# Patient Record
Sex: MALE | Race: BLACK OR AFRICAN AMERICAN | Employment: OTHER | URBAN - METROPOLITAN AREA
[De-identification: names, ages, dates, MRNs, and addresses within clinical notes are randomized per-mention and may not be internally consistent; named-entity substitution may affect disease eponyms.]

---

## 2018-03-07 ENCOUNTER — OFFICE VISIT (OUTPATIENT)
Dept: FAMILY MEDICINE | Facility: CLINIC | Age: 73
End: 2018-03-07
Payer: COMMERCIAL

## 2018-03-07 VITALS
OXYGEN SATURATION: 9 % | TEMPERATURE: 98.6 F | SYSTOLIC BLOOD PRESSURE: 153 MMHG | DIASTOLIC BLOOD PRESSURE: 74 MMHG | HEART RATE: 48 BPM | WEIGHT: 176.8 LBS

## 2018-03-07 DIAGNOSIS — Z29.9 PREVENTIVE MEASURE: Primary | ICD-10-CM

## 2018-03-07 DIAGNOSIS — I25.118 CORONARY ARTERY DISEASE OF NATIVE HEART WITH STABLE ANGINA PECTORIS, UNSPECIFIED VESSEL OR LESION TYPE (H): ICD-10-CM

## 2018-03-07 DIAGNOSIS — I10 ESSENTIAL HYPERTENSION, BENIGN: ICD-10-CM

## 2018-03-07 LAB
ALBUMIN SERPL-MCNC: 4.4 MG/DL (ref 3.3–4.9)
ALP SERPL-CCNC: 63.5 U/L (ref 40–150)
ALT SERPL-CCNC: 17.8 U/L (ref 0–45)
AST SERPL-CCNC: 20.2 U/L (ref 0–55)
BILIRUB SERPL-MCNC: 0.7 MG/DL (ref 0.2–1.3)
BUN SERPL-MCNC: 20.1 MG/DL (ref 7–21)
CALCIUM SERPL-MCNC: 9.6 MG/DL (ref 8.5–10.1)
CHLORIDE SERPLBLD-SCNC: 105.1 MMOL/L (ref 98–110)
CO2 SERPL-SCNC: 24.9 MMOL/L (ref 20–32)
CREAT SERPL-MCNC: 1.4 MG/DL (ref 0.7–1.3)
GFR SERPL CREATININE-BSD FRML MDRD: 53.3 ML/MIN/1.7 M2
GLUCOSE SERPL-MCNC: 107 MG'DL (ref 70–99)
HBA1C MFR BLD: 5.7 % (ref 4.1–5.7)
POTASSIUM SERPL-SCNC: 4 MMOL/DL (ref 3.2–4.6)
PROT SERPL-MCNC: 7.1 G/DL (ref 6.8–8.8)
SODIUM SERPL-SCNC: 136.2 MMOL/L (ref 132–142)

## 2018-03-07 RX ORDER — CHOLECALCIFEROL (VITAMIN D3) 50 MCG
2000 TABLET ORAL DAILY
Qty: 100 TABLET | Refills: 3 | Status: SHIPPED | OUTPATIENT
Start: 2018-03-07 | End: 2019-04-09

## 2018-03-07 NOTE — PATIENT INSTRUCTIONS
1 Get meds from home  for reconciliation  2 Get list of meds from pharmacy  3 Get pharmacy appointment  to go over your medicaiotbns   4 I have added vitamin/sent to your pharmacy  5 once you have see the pharmacist make and appointment  with me, we will check  your kidneys today    PHARM - D APPOINTMENT:  Date:  Monday March 12, 2018  Time:  10:00 AM  Demetria Rosa  3/8/18

## 2018-03-07 NOTE — MR AVS SNAPSHOT
After Visit Summary   3/7/2018    Srikanth Graves    MRN: [id number]           Patient Information     Date Of Birth          1945        Visit Information        Provider Department      3/7/2018 10:40 AM Rohit Pastor MD [facility]        Today's Diagnoses     Preventive measure    -  1    Essential hypertension, benign        Coronary artery disease of native heart with stable angina pectoris, unspecified vessel or lesion type (H)          Care Instructions    1 Get meds from home  for reconciliation  2 Get list of meds from pharmacy  3 Get pharmacy appointment  to go over your medicaiotbns   4 I have added vitamin/sent to your pharmacy  5 once you have see the pharmacist make and appointment  with me, we will check  your kidneys today          Follow-ups after your visit        Who to contact     Please call your clinic at [phone] to:    Ask questions about your health    Make or cancel appointments    Discuss your medicines    Learn about your test results    Speak to your doctor            Additional Information About Your Visit        MyChart Information     [employer] is an electronic gateway that provides easy, online access to your medical records. With [facility], you can request a clinic appointment, read your test results, renew a prescription or communicate with your care team.     To sign up for [employer] visit the website at [email or web address]   You will be asked to enter the access code listed below, as well as some personal information. Please follow the directions to create your username and password.     Your access code is: [email or web address]  Expires: 2018 11:03 AM     Your access code will  in 90 days. If you need help or a new code, please contact your [facility] or call [phone] for assistance.        Care EveryWhere ID     This is your Care EveryWhere ID. This could be used by other organizations to access your [place]  medical records  [id number]        Your Vitals Were     Pulse Temperature Pulse Oximetry             48 98.6  F (37  C) (Oral) 9%          Blood Pressure from Last 3 Encounters:   03/07/18 153/74    Weight from Last 3 Encounters:   03/07/18 176 lb 12.8 oz (80.2 kg)              We Performed the Following     Comprehensive Metabolic Panel ([facility])     Hemoglobin A1c (P )     Vitamin D 25-Hydroxy ([facility])          Today's Medication Changes          These changes are accurate as of 3/7/18 11:03 AM.  If you have any questions, ask your nurse or doctor.               Start taking these medicines.        Dose/Directions    vitamin D 2000 UNITS tablet   Used for:  Preventive measure   Started by:  Rohit Pastor MD        Dose:  2000 Units   Take 2,000 Units by mouth daily   Quantity:  100 tablet   Refills:  3            Where to get your medicines      These medications were sent to [facility] [place], [place] - [address], [place] [address]     Phone:  [phone]     vitamin D 2000 UNITS tablet                Primary Care Provider Office Phone # Fax #    Rohit Pastor [phone]       [address] [place] [id number]        Equal Access to Services     STEPHANIE FLOWERS AH: Hadii lily ku hadasho Sobennettali, waaxda luqadaha, qaybta kaalmada adeegyada, evens salomon. So [facility] [phone].    ATENCIÓN: Si habla español, tiene a andres disposición servicios gratuitos de asistencia lingüística. Edwar al [phone].    We comply with applicable federal civil rights laws and [place] laws. We do not discriminate on the basis of race, color, national origin, age, disability, sex, sexual orientation, or gender identity.            Thank you!     Thank you for choosing [facility]  for your care. Our goal is always to provide you with excellent care. Hearing back from our patients is one way we can continue to improve our  services. Please take a few minutes to complete the written survey that you may receive in the mail after your visit with us. Thank you!             Your Updated Medication List - Protect others around you: Learn how to safely use, store and throw away your medicines at www.disposemymeds.org.          This list is accurate as of 3/7/18 11:03 AM.  Always use your most recent med list.                   Brand Name Dispense Instructions for use Diagnosis    vitamin D 2000 UNITS tablet     100 tablet    Take 2,000 Units by mouth daily    Preventive measure

## 2018-03-07 NOTE — PROGRESS NOTES
"S: Srikanth Graves is a 72 year old male who returns for follow up of  To establish care, see at [facility]/\"not happy with care\"   Patients states that main concern today is  Establish care and follow kidneys, was seen recently  At [facility] on 1/22/17 Creat was 2.25 GFR 35 for  and old he had kidney problems, .  He states that He has back problems and in the past was going pain clinic at [facility] and  Long time ago was on methadone    PMHX/PSHX/MEDS/ALLERGIES/SHX/FHX reviewed and updated in Epic.  From records, CAD, HTN,dylipedemai, high sugars bur normal A1C  ROS:  General: No fevers, chills  Head: No headache  Ears: No acute change in hearing.    CV: No chest pain or palpitations.  Resp: No shortness of breath.  No cough. No hemoptysis.  GI: No nausea, vomiting, constipation, diarrhea  : No urinary pains    O: /74  Pulse (!) 48  Temp 98.6  F (37  C) (Oral)  Wt 176 lb 12.8 oz (80.2 kg)  SpO2 (!) 9%   Gen:  Well nourished and in NAD    Neck: supple without lymphadenopathy  CV:  RRR  - no murmurs, rubs, or gallups,   Pulm:  CTAB, no wheezes/rales/rhonchi, good air entry   ABD: soft, nontender, no masses, no rebound, BS intact throughout  Extrem: no cyanosis, edema or clubbing  Psych: Euthymic      1. Establish care  2 HTN: needs better controlled BMP( recheck creat/GFR) today as well A1C, need med reconciliation  3 Back pain   No narcotic pain from me/accepts plan  Tylenol 500 mg BID to TID, activity as tolerated to keep ROM and flexibility,  Will add vitamin D/at risk  and  Continue taken your calcium supplements  3 Need med reconciliation appoinment with pharmacy, then appointment  with me        RTC in 3 weeks, for follow up of HTN or sooner if develops new or worsening symptoms.    Rohit Pastor"

## 2018-03-07 NOTE — LETTER
March 7, 2018      Srikanth Graves  [address] [place] [address]        Dear Srikanth,  Kidney function has gotten better    Need better  Blood pressure control  Please see below for your test results.    Resulted Orders   Hemoglobin A1c ([facility])   Result Value Ref Range    Hemoglobin A1C 5.7 4.1 - 5.7 %   Comprehensive Metabolic Panel ([facility])   Result Value Ref Range    Albumin 4.4 3.3 - 4.9 mg/dL    Alkaline Phosphatase 63.5 40.0 - 150.0 U/L    ALT 17.8 0.0 - 45.0 U/L    AST 20.2 0.0 - 55.0 U/L    Bilirubin Total 0.7 0.2 - 1.3 mg/dL    Urea Nitrogen 20.1 7.0 - 21.0 mg/dL    Calcium 9.6 8.5 - 10.1 mg/dL    Chloride 105.1 98.0 - 110.0 mmol/L    Carbon Dioxide 24.9 20.0 - 32.0 mmol/L    Creatinine 1.4 (H) 0.7 - 1.3 mg/dL    Glucose 107.0 (H) 70.0 - 99.0 mg'dL    Potassium 4.0 3.2 - 4.6 mmol/dL    Sodium 136.2 132.0 - 142.0 mmol/L    Protein Total 7.1 6.8 - 8.8 g/dL    GFR Estimate 53.3 (L) >60.0 mL/min/1.7 m2    GFR Estimate If Black 64.4 >60.0 mL/min/1.7 m2       If you have any questions, please call the clinic to make an appointment.    Sincerely,    Rohit Pastor MD

## 2018-03-07 NOTE — LETTER
March 8, 2018      Srikanth Graves  [address]  [place] [address]        Dear Srikanth,  Vitamin d is good    Continue taken the vitamin D 2000Iu                Please see below for your test results.    Resulted Orders   Vitamin D 25-Hydroxy ([facility])   Result Value Ref Range    Vitamin D,25-Hydroxy 35.7 30.0 - 80.0 ng/mL    Narrative    Test performed by:  [facility]  [address] [place] [address]  Deficiency <10.0 ng/mL  Insufficiency 10.0-29.9 ng/mL  Sufficiency 30.0-80.0 ng/mL  Toxicity (possible) >100.0 ng/mL   Hemoglobin A1c ([facility])   Result Value Ref Range    Hemoglobin A1C 5.7 4.1 - 5.7 %   Comprehensive Metabolic Panel ([facility])   Result Value Ref Range    Albumin 4.4 3.3 - 4.9 mg/dL    Alkaline Phosphatase 63.5 40.0 - 150.0 U/L    ALT 17.8 0.0 - 45.0 U/L    AST 20.2 0.0 - 55.0 U/L    Bilirubin Total 0.7 0.2 - 1.3 mg/dL    Urea Nitrogen 20.1 7.0 - 21.0 mg/dL    Calcium 9.6 8.5 - 10.1 mg/dL    Chloride 105.1 98.0 - 110.0 mmol/L    Carbon Dioxide 24.9 20.0 - 32.0 mmol/L    Creatinine 1.4 (H) 0.7 - 1.3 mg/dL    Glucose 107.0 (H) 70.0 - 99.0 mg'dL    Potassium 4.0 3.2 - 4.6 mmol/dL    Sodium 136.2 132.0 - 142.0 mmol/L    Protein Total 7.1 6.8 - 8.8 g/dL    GFR Estimate 53.3 (L) >60.0 mL/min/1.7 m2    GFR Estimate If Black 64.4 >60.0 mL/min/1.7 m2       If you have any questions, please call the clinic to make an appointment.    Sincerely,    Rohit Pastor MD

## 2018-03-08 LAB — 25(OH)D3 SERPL-MCNC: 35.7 NG/ML (ref 30–80)

## 2018-03-12 ENCOUNTER — OFFICE VISIT (OUTPATIENT)
Dept: PHARMACY | Facility: CLINIC | Age: 73
End: 2018-03-12
Payer: COMMERCIAL

## 2018-03-12 VITALS
TEMPERATURE: 98.1 F | DIASTOLIC BLOOD PRESSURE: 78 MMHG | WEIGHT: 176.4 LBS | HEART RATE: 48 BPM | SYSTOLIC BLOOD PRESSURE: 162 MMHG | OXYGEN SATURATION: 97 %

## 2018-03-12 DIAGNOSIS — E87.6 HYPOKALEMIA: ICD-10-CM

## 2018-03-12 DIAGNOSIS — H40.1111 PRIMARY OPEN ANGLE GLAUCOMA OF RIGHT EYE, MILD STAGE: ICD-10-CM

## 2018-03-12 DIAGNOSIS — G89.29 CHRONIC LOW BACK PAIN, UNSPECIFIED BACK PAIN LATERALITY, WITH SCIATICA PRESENCE UNSPECIFIED: ICD-10-CM

## 2018-03-12 DIAGNOSIS — G47.00 INSOMNIA, UNSPECIFIED TYPE: ICD-10-CM

## 2018-03-12 DIAGNOSIS — K21.9 GASTROESOPHAGEAL REFLUX DISEASE, ESOPHAGITIS PRESENCE NOT SPECIFIED: ICD-10-CM

## 2018-03-12 DIAGNOSIS — K59.09 OTHER CONSTIPATION: Primary | ICD-10-CM

## 2018-03-12 DIAGNOSIS — H04.123 DRY EYES: ICD-10-CM

## 2018-03-12 DIAGNOSIS — F32.A DEPRESSION, UNSPECIFIED DEPRESSION TYPE: ICD-10-CM

## 2018-03-12 DIAGNOSIS — I25.118 CORONARY ARTERY DISEASE OF NATIVE ARTERY OF NATIVE HEART WITH STABLE ANGINA PECTORIS (H): ICD-10-CM

## 2018-03-12 DIAGNOSIS — Z00.00 ROUTINE ADULT HEALTH MAINTENANCE: ICD-10-CM

## 2018-03-12 DIAGNOSIS — Z29.9 PREVENTIVE MEASURE: ICD-10-CM

## 2018-03-12 DIAGNOSIS — J44.9 CHRONIC OBSTRUCTIVE PULMONARY DISEASE, UNSPECIFIED COPD TYPE (H): ICD-10-CM

## 2018-03-12 DIAGNOSIS — L85.3 DRY SKIN: ICD-10-CM

## 2018-03-12 DIAGNOSIS — M54.5 CHRONIC LOW BACK PAIN, UNSPECIFIED BACK PAIN LATERALITY, WITH SCIATICA PRESENCE UNSPECIFIED: ICD-10-CM

## 2018-03-12 DIAGNOSIS — I10 ESSENTIAL HYPERTENSION: ICD-10-CM

## 2018-03-12 PROBLEM — G89.4 CHRONIC PAIN DISORDER: Status: ACTIVE | Noted: 2017-02-08

## 2018-03-12 PROBLEM — M53.3 COCCYDYNIA: Status: ACTIVE | Noted: 2018-03-12

## 2018-03-12 PROBLEM — R51.9 HEADACHE DISORDER: Status: ACTIVE | Noted: 2017-05-12

## 2018-03-12 PROBLEM — E55.9 VITAMIN D DEFICIENCY: Status: ACTIVE | Noted: 2017-11-24

## 2018-03-12 PROBLEM — F43.21 ADJUSTMENT DISORDER WITH DEPRESSED MOOD: Status: ACTIVE | Noted: 2017-07-11

## 2018-03-12 PROBLEM — M54.50 CHRONIC LOW BACK PAIN: Status: ACTIVE | Noted: 2018-03-12

## 2018-03-12 PROBLEM — F41.9 ANXIETY DISORDER: Status: RESOLVED | Noted: 2018-01-31 | Resolved: 2018-03-12

## 2018-03-12 PROBLEM — I73.9 PERIPHERAL VASCULAR DISEASE (H): Status: ACTIVE | Noted: 2018-03-12

## 2018-03-12 PROBLEM — R06.09 DYSPNEA ON EXERTION: Status: ACTIVE | Noted: 2017-09-22

## 2018-03-12 PROBLEM — R94.39 ABNORMAL STRESS ECHOCARDIOGRAM: Status: ACTIVE | Noted: 2018-03-12

## 2018-03-12 PROBLEM — R94.39 ABNORMAL STRESS ECHOCARDIOGRAM: Status: RESOLVED | Noted: 2018-03-12 | Resolved: 2018-03-12

## 2018-03-12 PROBLEM — Z91.148 CONTROLLED SUBSTANCE AGREEMENT TERMINATED: Status: ACTIVE | Noted: 2017-07-07

## 2018-03-12 PROBLEM — F43.21 ADJUSTMENT DISORDER WITH DEPRESSED MOOD: Status: RESOLVED | Noted: 2017-07-11 | Resolved: 2018-03-12

## 2018-03-12 PROBLEM — I25.10 CORONARY ATHEROSCLEROSIS: Status: RESOLVED | Noted: 2018-03-12 | Resolved: 2018-03-12

## 2018-03-12 PROBLEM — I25.10 CORONARY ATHEROSCLEROSIS: Status: ACTIVE | Noted: 2018-03-12

## 2018-03-12 PROBLEM — F41.9 ANXIETY DISORDER: Status: ACTIVE | Noted: 2018-01-31

## 2018-03-12 PROBLEM — M50.30 DEGENERATION OF CERVICAL INTERVERTEBRAL DISC: Status: ACTIVE | Noted: 2018-03-12

## 2018-03-12 RX ORDER — ATORVASTATIN CALCIUM 20 MG/1
20 TABLET, FILM COATED ORAL
COMMUNITY
Start: 2017-06-21 | End: 2018-03-14

## 2018-03-12 RX ORDER — ALBUTEROL SULFATE 90 UG/1
AEROSOL, METERED RESPIRATORY (INHALATION)
COMMUNITY
Start: 2017-06-21 | End: 2018-03-14

## 2018-03-12 RX ORDER — ISOSORBIDE MONONITRATE 30 MG/1
30 TABLET, EXTENDED RELEASE ORAL
COMMUNITY
Start: 2018-01-22 | End: 2018-03-12

## 2018-03-12 RX ORDER — TIOTROPIUM BROMIDE 18 UG/1
CAPSULE ORAL; RESPIRATORY (INHALATION)
COMMUNITY
Start: 2018-01-05 | End: 2018-03-12

## 2018-03-12 RX ORDER — DORZOLAMIDE HYDROCHLORIDE AND TIMOLOL MALEATE 20; 5 MG/ML; MG/ML
SOLUTION/ DROPS OPHTHALMIC
COMMUNITY
Start: 2017-09-25 | End: 2018-03-14

## 2018-03-12 RX ORDER — LISINOPRIL 40 MG/1
40 TABLET ORAL
COMMUNITY
Start: 2018-01-05 | End: 2018-03-12

## 2018-03-12 RX ORDER — POTASSIUM CHLORIDE 750 MG/1
20 TABLET, EXTENDED RELEASE ORAL DAILY
Qty: 90 TABLET | COMMUNITY
Start: 2018-03-12 | End: 2018-03-14

## 2018-03-12 RX ORDER — NITROGLYCERIN 400 UG/1
SPRAY ORAL
COMMUNITY
Start: 2017-04-07 | End: 2019-01-30

## 2018-03-12 RX ORDER — RANOLAZINE 500 MG/1
500 TABLET, EXTENDED RELEASE ORAL DAILY
COMMUNITY
Start: 2018-03-12 | End: 2019-03-01

## 2018-03-12 RX ORDER — FUROSEMIDE 20 MG
20 TABLET ORAL DAILY
Qty: 60 TABLET | Refills: 1 | COMMUNITY
Start: 2018-03-12 | End: 2018-03-14

## 2018-03-12 RX ORDER — TRAZODONE HYDROCHLORIDE 50 MG/1
TABLET, FILM COATED ORAL
COMMUNITY
Start: 2017-05-03 | End: 2018-03-13

## 2018-03-12 RX ORDER — MULTIVITAMIN WITH FOLIC ACID 400 MCG
TABLET ORAL
COMMUNITY
Start: 2017-06-21 | End: 2018-03-12

## 2018-03-12 RX ORDER — POTASSIUM CHLORIDE 750 MG/1
20 TABLET, EXTENDED RELEASE ORAL
COMMUNITY
Start: 2017-12-21 | End: 2018-03-12

## 2018-03-12 RX ORDER — ISOSORBIDE MONONITRATE 30 MG/1
30 TABLET, EXTENDED RELEASE ORAL DAILY
Qty: 30 TABLET | COMMUNITY
Start: 2018-03-12 | End: 2018-08-31

## 2018-03-12 RX ORDER — RANOLAZINE 500 MG/1
500 TABLET, EXTENDED RELEASE ORAL
COMMUNITY
Start: 2018-02-28 | End: 2018-03-12

## 2018-03-12 RX ORDER — TIOTROPIUM BROMIDE 18 UG/1
18 CAPSULE ORAL; RESPIRATORY (INHALATION) DAILY
Qty: 30 CAPSULE | COMMUNITY
Start: 2018-03-12 | End: 2018-03-14

## 2018-03-12 RX ORDER — POLYVINYL ALCOHOL 14 MG/ML
1 SOLUTION/ DROPS OPHTHALMIC PRN
COMMUNITY
Start: 2018-03-12 | End: 2018-03-14

## 2018-03-12 RX ORDER — LISINOPRIL 40 MG/1
40 TABLET ORAL DAILY
Qty: 30 TABLET | COMMUNITY
Start: 2018-03-12 | End: 2018-03-14

## 2018-03-12 RX ORDER — ACETAMINOPHEN 500 MG
1000 TABLET ORAL 3 TIMES DAILY
COMMUNITY
Start: 2018-02-21 | End: 2018-03-14

## 2018-03-12 RX ORDER — CLOPIDOGREL BISULFATE 75 MG/1
75 TABLET ORAL
COMMUNITY
Start: 2018-02-09 | End: 2018-03-12

## 2018-03-12 RX ORDER — LANOLIN ALCOHOL/MO/W.PET/CERES
CREAM (GRAM) TOPICAL 3 TIMES DAILY PRN
COMMUNITY
Start: 2018-03-12 | End: 2018-03-14

## 2018-03-12 RX ORDER — METOPROLOL TARTRATE 100 MG
TABLET ORAL
COMMUNITY
Start: 2017-12-01 | End: 2018-03-14

## 2018-03-12 RX ORDER — ASPIRIN 81 MG
100 TABLET, DELAYED RELEASE (ENTERIC COATED) ORAL 2 TIMES DAILY PRN
Qty: 60 TABLET | Refills: 11 | Status: SHIPPED | OUTPATIENT
Start: 2018-03-12 | End: 2019-04-26

## 2018-03-12 RX ORDER — CLOPIDOGREL BISULFATE 75 MG/1
75 TABLET ORAL DAILY
Qty: 30 TABLET | COMMUNITY
Start: 2018-03-12 | End: 2018-03-14

## 2018-03-12 RX ORDER — LIDOCAINE 50 MG/G
OINTMENT TOPICAL
COMMUNITY
Start: 2018-01-05 | End: 2018-03-14

## 2018-03-12 RX ORDER — LANOLIN ALCOHOL/MO/W.PET/CERES
454 CREAM (GRAM) TOPICAL
COMMUNITY
Start: 2017-06-21 | End: 2018-03-12

## 2018-03-12 RX ORDER — POLYVINYL ALCOHOL 14 MG/ML
1 SOLUTION/ DROPS OPHTHALMIC
COMMUNITY
Start: 2016-02-03 | End: 2018-03-12

## 2018-03-12 RX ORDER — ECHINACEA PURPUREA EXTRACT 125 MG
TABLET ORAL
COMMUNITY
Start: 2018-02-21 | End: 2018-03-14

## 2018-03-12 RX ORDER — MULTIVITAMIN WITH FOLIC ACID 400 MCG
1 TABLET ORAL DAILY
Qty: 30 TABLET | COMMUNITY
Start: 2018-03-12 | End: 2018-03-14

## 2018-03-12 NOTE — PATIENT INSTRUCTIONS
Tylenol- 500 mg (2 tablets) three times a day for pain    Use your Albuterol only as needed when your are having shortness of breath/loud breathing noises or cough.  Otherwise keep taking your preventative inhalers every day.     Ranitidine - No more than 150 mg (1 tablet) daily as needed for heartburn symptoms.    Stop taking Benadryl.  Use Trazodone for sleep.    Stop taking Docusate-sennosides.     We will be sending a new prescription for Docusate - 1-2 tablets daily as needed for constipation.      Return to see Dr. Pastor in 1-2 weeks.

## 2018-03-12 NOTE — PROGRESS NOTES
Medication Management Note                                                       Srikanth was referred by Dr. Pastor for pharmacy services for med management.    MEDICATION REVIEW:  Discussed all medication indications, dosage and effectiveness, adverse effects, and adherence with patient/caregiver.    Pt had meds with them: yes  Pt had med list with them: yes (from [facility])  Pt was knowledgeable about meds: yes  Medications set up by: self   Medications administered by someone else (e.g., LTCF): No  Pt uses a medication box or automated dispenser: no - see subjective section  Called pharmacy to obtain or clarify med list:  no  Called HHN or LTCF to obtain or clarify med list:  no    Medication Discrepancies  Patient new to [facility], and only had vitamin D on his medication list in this Epic system. All other medications had to be entered. All discrepancies listed below are in regards to his med list from the Cardiology note at [facility] available from Care Everywhere on 2/28/2018:     Medications on [facility] med list that pt is NOT taking:  yes, calcium/vit D, benzonatate, cetirizine, milk of magnesia, Robitussin DM, terazosin, naratriptan, and Mintox   Medications pt IS taking that are NOT on [facility] med list (e.g., from specialist, hospital): yes, vit D3, artificial tears, and ranolazine were added to his medication list   OTC meds/ dietary supplements pt taking on own that are NOT on [facility] med list:  none  Dosage listed differently than how patient is taking: yes    Patient was decreased from Imdur 120 mg daily to 30 mg daily about 1 month ago    Patient sometimes only takes 25 mg of trazodone for sleep   Frequency listed differently than how patient is taking: yes, patient states he sometimes takes another furosemide if his left foot is swollen  Duplicate medication on list (two occurrences of the same medication):  none  TOTAL NUMBER OF MEDICATION DISCREPANCIES:  13    Subjective  "                                                      Patient reports the following problems or concerns with their medications:  Yes    Patient used to take a higher dose of aspirin (325) and stated it made his \"kidneys hurt,\" but the lower dose (81mg) has improved this.    The Imdur makes his headaches worse but the lower dose has improved this.    He dislikes having to take so many medications in general.  Patient reports the following adverse reactions to medications:  yes, see above  Pt reports missing doses:  never  Additional subjective information (e.g., reason for visit, frequency of PRNs, reasons meds were D/C ed):    Patient prefers to use the [facility] on [address]    He was recently increased from sertraline 25 mg daily to 50 mg daily about 1 month ago.    Patient stated he was taking acetaminophen 1000 mg in the morning and 500 mg in the evening. He stated it helps a little bit with his pain and headaches. He describes trying to have a good attitude, but it gets difficult sometimes because the pain makes him angry.    Patient reports having his aspirin dose decreased to 81 mg daily about 1 month ago. He was also recently started on clopidogrel after stent placement in Feb 2018. He denies signs/symptoms of bleed like bruising, bloody noses, dark stools.     Patient stated he took the ranitidine 150 mg as needed, but up to 2 times per day. He states that pasta, chili, and BBQ sauce make his acid reflux worse.     Patient states he has been suffering from a cough for over a year. He threw away his Robitussin and benzonatate because they did not work at all.    Patient stated he occasionally takes an additional furosemide if his left foot is swollen, but that is only 1-2 times per month.    Patient described appropriate technique for his Spiriva, Dulera, and Ventolin. He uses his Spiriva and Dulera regularly, but he also described using his albuterol regularly (3-4 times per day even " without symptoms of shortness of breath or wheezing).     He takes his AM meds first thing in the morning when he eats breakfast, and his PM meds with the 18:00 news. His significant other was able to verify that he takes his medications regularly and is able to manage all of them even without a pill box. Patient states he tried pill boxes in the past, but he actually forgets to take them if they are in the box, so he uses the bottles instead.     Objective                                                       Patient Active Problem List   Diagnosis     Other constipation     Abnormal stress echocardiogram     Adjustment disorder with depressed mood     Advance care planning     Anxiety disorder     Backache     Bunion of great toe     Degeneration of cervical intervertebral disc     Chronic airway obstruction (H)     Chronic low back pain     Chronic pain disorder     Chronic obstructive pulmonary disease (H)     Coronary artery disease of native artery of native heart with stable angina pectoris (H)     Coronary atherosclerosis     Depression     DJD (degenerative joint disease), ankle and foot     Dyspnea on exertion     Edema     Esophageal reflux     Essential hypertension     Headache disorder     Peripheral vascular disease (H)     Primary open angle glaucoma of right eye, mild stage     Vitamin D deficiency       Current Outpatient Prescriptions   Medication Sig Dispense Refill     docusate sodium (COLACE) 100 MG tablet Take 100 mg by mouth 2 times daily as needed for constipation 60 tablet 11     Cholecalciferol (VITAMIN D) 2000 UNITS tablet Take 2,000 Units by mouth daily 100 tablet 3       Social History   Substance Use Topics     Smoking status: Former Smoker     Smokeless tobacco: Never Used     Alcohol use No       Lab Results   Component Value Date    A1C 5.7 03/07/2018     Last Basic Metabolic Panel:  Lab Results   Component Value Date    .2 03/07/2018      Lab Results   Component Value Date     POTASSIUM 4.0 03/07/2018     Lab Results   Component Value Date    CHLORIDE 105.1 03/07/2018     Lab Results   Component Value Date    ROCKY 9.6 03/07/2018     Lab Results   Component Value Date    CO2 24.9 03/07/2018     Lab Results   Component Value Date    BUN 20.1 03/07/2018     Lab Results   Component Value Date    CR 1.4 03/07/2018     Lab Results   Component Value Date    .0 03/07/2018       BP Readings from Last 3 Encounters:   03/12/18 162/78   03/07/18 153/74         Assessment                                                       COPD -  controlled     Patient has the following inhalers: Spiriva, Dulera, and Ventolin.    Patient has been taking all 3 inhalers on a regular basis even when he does not have symptoms of shortness of breath or wheezing. Does not understand that some are controllers and Ventolin is a rescue.    Patient demonstrated and described appropriate inhaler use.    GERD -  controlled     Patient was taking ranitidine 150 mg up to twice a day depending on how he felt his acid reflux was doing and what types of food he eats (pasta, chili, and BBQ sauce make it worse).     CrCL just under 50 mL/min based on SCr of 1.4 in clinic today.    Maximum dose of ranitidine 150 mg once daily, if CrCL is <50 mL/min, as it can accumulate.     Pain -  uncontrolled     Patient takes 1000 mg acetaminophen in the morning and 500 mg at bedtime. Maximum dose is 3000 mg daily with no liver disease.     Sleep -  controlled     Patient has been using trazodone and diphenhydramine as needed for sleep (mostly on days when he takes a nap earlier as it is more difficult to fall asleep later on after the nap).     Diphenhydramine is on the Beer's List and has higher risk of anticholinergic adverse effects as he is 72 years old.     Constipation -  controlled     Patient has been using docusate 50 mg/sennosides 8.6 mg once daily at bedtime every day but reports no issues with irregularity.      Plan/Recommendations                                                       Updated medication list in the EMR; deleted meds patient no longer taking and added meds patient is now taking, and changed doses where there was a dose discrepancy.    All medications were reviewed and found to be indicated, effective, safe and convenient/ affordable unless drug therapy problem(s) was/were identified, as are described below.      Completed at this visit     COPD    Continue Spiriva and Dulera inhalers    Instructed patient to only use albuterol (Ventolin) inhaler if needed for symptoms of shortness of breath or wheezing.    GERD    Decrease frequency of ranitidine to 150 mg once daily for acid reflux.    Pain    Increase acetaminophen dose to 1000 mg by mouth 3 times daily for pain.    Sleep    Continue trazodone as needed for sleep.    Discontinue diphenhydramine.     Constipation    Initiate docusate 100 mg take 1-2 capsules twice daily as needed.    Instructed patient that he may use the sennosides/docusate as needed if he is constipated, but not to take it every day.     To be completed at a future visit  Hypertension    Assess blood pressure control and BP medications at next visit.     Pain    Assess pain control with new dose of acetaminophen at the next visit.     Cough    Assess at next visit: lisinopril side-effect vs GERD vs other cause.    Options for treatment and/or follow-up care were reviewed with the patient.  Srikanth was engaged and actively involved in the decision making process, verbalized understanding of the options discussed, and was satisfied with the final plan.    Follow-up                                                       Patient should follow up in 1-2 weeks with Dr. Pastor and pharmacist.  Patient was provided with written instructions/medication list via AVS.     Dr. Pastor was provided the recommendations above  in clinic today and Dr. Pastor was available for supervision during this  visit and is the authorizing prescriber for this visit through the pharmacist collaborative practice agreement.    Ana Dee PharmD Student    Drug therapy problems identified  1. Med: albuterol HFA - Compliance - Patient using medication more often than prescribed/needed - Resolution: Educate patient; resolved  2. Med: ranitidine - Safety - dose too high - Resolution: Change dose; resolved   3. Med: acetaminophen - Efficacy  - dose too low - Resolution: Change dose; resolved   4. Med: diphenhydramine - Safety - risk of side-effects - Resolution: Discontinue Drug; resolved  5. Med: docusate/sennosides - Indication - unnecessary drug therapy - Resolution: Change drug; resolved  6.   Med: lisinopril, metoprolol - Indication - May need additional drug therapy - Resolution: Intiate Drug; deferred to future visit  7.   Med: lisinopril - Safety - possible adverse effect - Resolution: Change drug; deferred to future visit    # of medical conditions addressed: 5  # of medications addressed: 34  # of medication discrepancies identified: 13  # of DTP identified: 5  Time spent: >60 minutes  Level of service: 5    The student acted as scribe and the encounter documented was completely performed by myself. I have reviewed and verified the student s documentation and found it to be correct and complete.  Daja Luna, Pharm.D.

## 2018-03-12 NOTE — MR AVS SNAPSHOT
After Visit Summary   3/12/2018    Srikanth Graves    MRN: [id number]           Patient Information     Date Of Birth          1945        Visit Information        Provider Department      3/12/2018 10:00 [facility]        Today's Diagnoses     Other constipation    -  1      Care Instructions    Tylenol- 500 mg (2 tablets) three times a day for pain    Use your Albuterol only as needed when your are having shortness of breath/loud breathing noises or cough.  Otherwise keep taking your preventative inhalers every day.     Ranitidine - No more than 150 mg (1 tablet) daily as needed for heartburn symptoms.    Stop taking Benadryl.  Use Trazodone for sleep.    Stop taking Docusate-sennosides.     We will be sending a new prescription for Docusate - 1-2 tablets daily as needed for constipation.      Return to see Dr. Pastor in 1-2 weeks.          Follow-ups after your visit        Who to contact     Please call your clinic at [phone] to:    Ask questions about your health    Make or cancel appointments    Discuss your medicines    Learn about your test results    Speak to your doctor            Additional Information About Your Visit        MyChart Information     [employer] is an electronic gateway that provides easy, online access to your medical records. With [employer], you can request a clinic appointment, read your test results, renew a prescription or communicate with your care team.     To sign up for [employer] visit the website at [email or web address]   You will be asked to enter the access code listed below, as well as some personal information. Please follow the directions to create your username and password.     Your access code is: [email or web address]  Expires: 2018 12:03 PM     Your access code will  in 90 days. If you need help or a new code, please contact your [facility] or call [phone] for assistance.        Care  EveryWhere ID     This is your Care EveryWhere ID. This could be used by other organizations to access your [facility] medical records  [id number]        Your Vitals Were     Pulse Temperature Pulse Oximetry             48 98.1  F (36.7  C) (Oral) 97%          Blood Pressure from Last 3 Encounters:   03/12/18 162/78   03/07/18 153/74    Weight from Last 3 Encounters:   03/12/18 176 lb 6.4 oz (80 kg)   03/07/18 176 lb 12.8 oz (80.2 kg)              Today, you had the following     No orders found for display         Today's Medication Changes          These changes are accurate as of 3/12/18 11:45 AM.  If you have any questions, ask your nurse or doctor.               Start taking these medicines.        Dose/Directions    docusate sodium 100 MG tablet   Commonly known as:  COLACE   Used for:  Other constipation        Dose:  100 mg   Take 100 mg by mouth 2 times daily as needed for constipation   Quantity:  60 tablet   Refills:  11            Where to get your medicines      These medications were sent to [facility] [place], [place] - [address], [place] [address]     Phone:  [phone]     docusate sodium 100 MG tablet                Primary Care Provider Office Phone # Fax #    Rohit Pastor [phone]       [address] [place] [id number]        Equal Access to Services     STEPHANIE FLOWERS AH: Hadii lily sommerso Sonika, waaxda luqadaha, qaybta kaalmada adeegyada, evens salomon. So [facility] [phone].    ATENCIÓN: Si habla español, tiene a andres disposición servicios gratuitos de asistencia lingüística. Delanoame al [phone].    We comply with applicable federal civil rights laws and [place] laws. We do not discriminate on the basis of race, color, national origin, age, disability, sex, sexual orientation, or gender identity.            Thank you!     Thank you for choosing [facility]  for your care. Our goal is  always to provide you with excellent care. Hearing back from our patients is one way we can continue to improve our services. Please take a few minutes to complete the written survey that you may receive in the mail after your visit with us. Thank you!             Your Updated Medication List - Protect others around you: Learn how to safely use, store and throw away your medicines at www.disposemymeds.org.          This list is accurate as of 3/12/18 11:45 AM.  Always use your most recent med list.                   Brand Name Dispense Instructions for use Diagnosis    docusate sodium 100 MG tablet    COLACE    60 tablet    Take 100 mg by mouth 2 times daily as needed for constipation    Other constipation       vitamin D 2000 UNITS tablet     100 tablet    Take 2,000 Units by mouth daily    Preventive measure

## 2018-03-14 RX ORDER — LIDOCAINE 50 MG/G
OINTMENT TOPICAL
Qty: 60 G | Refills: 3 | Status: SHIPPED | OUTPATIENT
Start: 2018-03-14 | End: 2018-03-16

## 2018-03-14 RX ORDER — ACETAMINOPHEN 500 MG
1000 TABLET ORAL 3 TIMES DAILY
Qty: 180 TABLET | Refills: 11 | Status: SHIPPED | OUTPATIENT
Start: 2018-03-14 | End: 2019-01-02

## 2018-03-14 RX ORDER — POTASSIUM CHLORIDE 750 MG/1
20 TABLET, EXTENDED RELEASE ORAL DAILY
Qty: 180 TABLET | Refills: 3 | Status: SHIPPED | OUTPATIENT
Start: 2018-03-14 | End: 2018-08-31

## 2018-03-14 RX ORDER — TRAZODONE HYDROCHLORIDE 50 MG/1
TABLET, FILM COATED ORAL
Qty: 180 TABLET | Refills: 3 | Status: SHIPPED | OUTPATIENT
Start: 2018-03-14 | End: 2020-05-14

## 2018-03-14 RX ORDER — LANOLIN ALCOHOL/MO/W.PET/CERES
CREAM (GRAM) TOPICAL
Qty: 180 G | Refills: 3 | Status: SHIPPED | OUTPATIENT
Start: 2018-03-14 | End: 2018-07-03

## 2018-03-14 RX ORDER — ECHINACEA PURPUREA EXTRACT 125 MG
TABLET ORAL
Qty: 3 BOTTLE | Refills: 3 | Status: SHIPPED | OUTPATIENT
Start: 2018-03-14 | End: 2019-01-30

## 2018-03-14 RX ORDER — TIOTROPIUM BROMIDE 18 UG/1
18 CAPSULE ORAL; RESPIRATORY (INHALATION) DAILY
Qty: 90 CAPSULE | Refills: 3 | Status: SHIPPED | OUTPATIENT
Start: 2018-03-14 | End: 2019-05-17

## 2018-03-14 RX ORDER — MULTIVITAMIN WITH FOLIC ACID 400 MCG
1 TABLET ORAL DAILY
Qty: 90 TABLET | Refills: 3 | Status: SHIPPED | OUTPATIENT
Start: 2018-03-14 | End: 2019-01-30

## 2018-03-14 RX ORDER — ALBUTEROL SULFATE 90 UG/1
AEROSOL, METERED RESPIRATORY (INHALATION)
Qty: 3 INHALER | Refills: 3 | Status: SHIPPED | OUTPATIENT
Start: 2018-03-14 | End: 2021-01-21

## 2018-03-14 RX ORDER — LISINOPRIL 40 MG/1
40 TABLET ORAL DAILY
Qty: 90 TABLET | Refills: 3 | Status: SHIPPED | OUTPATIENT
Start: 2018-03-14 | End: 2019-02-20

## 2018-03-14 RX ORDER — CLOPIDOGREL BISULFATE 75 MG/1
75 TABLET ORAL DAILY
Qty: 90 TABLET | Refills: 3 | Status: SHIPPED | OUTPATIENT
Start: 2018-03-14 | End: 2019-04-26

## 2018-03-14 RX ORDER — DORZOLAMIDE HYDROCHLORIDE AND TIMOLOL MALEATE 20; 5 MG/ML; MG/ML
SOLUTION/ DROPS OPHTHALMIC
Qty: 1 BOTTLE | Refills: 11 | Status: SHIPPED | OUTPATIENT
Start: 2018-03-14 | End: 2019-01-30

## 2018-03-14 RX ORDER — ATORVASTATIN CALCIUM 20 MG/1
20 TABLET, FILM COATED ORAL DAILY
Qty: 90 TABLET | Refills: 3 | Status: SHIPPED | OUTPATIENT
Start: 2018-03-14 | End: 2018-10-30

## 2018-03-14 RX ORDER — FUROSEMIDE 20 MG
20 TABLET ORAL DAILY
Qty: 90 TABLET | Refills: 3 | Status: SHIPPED | OUTPATIENT
Start: 2018-03-14 | End: 2019-05-17

## 2018-03-14 RX ORDER — POLYVINYL ALCOHOL 14 MG/ML
1 SOLUTION/ DROPS OPHTHALMIC PRN
Qty: 15 ML | Refills: 3 | Status: SHIPPED | OUTPATIENT
Start: 2018-03-14 | End: 2019-06-25

## 2018-03-14 RX ORDER — METOPROLOL TARTRATE 100 MG
100 TABLET ORAL 2 TIMES DAILY
Qty: 180 TABLET | Refills: 3 | Status: SHIPPED | OUTPATIENT
Start: 2018-03-14 | End: 2018-03-19

## 2018-03-16 ENCOUNTER — TELEPHONE (OUTPATIENT)
Dept: FAMILY MEDICINE | Facility: CLINIC | Age: 73
End: 2018-03-16

## 2018-03-16 DIAGNOSIS — G89.29 CHRONIC LOW BACK PAIN, UNSPECIFIED BACK PAIN LATERALITY, WITH SCIATICA PRESENCE UNSPECIFIED: ICD-10-CM

## 2018-03-16 DIAGNOSIS — M54.5 CHRONIC LOW BACK PAIN, UNSPECIFIED BACK PAIN LATERALITY, WITH SCIATICA PRESENCE UNSPECIFIED: ICD-10-CM

## 2018-03-16 RX ORDER — LIDOCAINE 50 MG/G
OINTMENT TOPICAL
Qty: 30 G | Refills: 3 | Status: SHIPPED | OUTPATIENT
Start: 2018-03-16 | End: 2018-08-31

## 2018-03-16 NOTE — TELEPHONE ENCOUNTER
Received a PA request for lidocaine ointment for this patient.  Pt was seen earlier this week by Dr Luna.  Looked it up on pt's insurance and it needs a PA and has A QL of 36gm.  Sent new Rx for appropriate amount and started PA.  Gave PA form to pharmacy student as Dr Pastor needs to sign it.  Pt will be in Monday to see both Lisset Pastor and Jeremy, so more information could be added to the PA.      Routed to Dr. Luna.    Cata Garcia, Pharm.D.

## 2018-03-19 ENCOUNTER — OFFICE VISIT (OUTPATIENT)
Dept: FAMILY MEDICINE | Facility: CLINIC | Age: 73
End: 2018-03-19
Payer: COMMERCIAL

## 2018-03-19 ENCOUNTER — OFFICE VISIT (OUTPATIENT)
Dept: PHARMACY | Facility: CLINIC | Age: 73
End: 2018-03-19
Payer: COMMERCIAL

## 2018-03-19 VITALS
RESPIRATION RATE: 16 BRPM | DIASTOLIC BLOOD PRESSURE: 73 MMHG | HEART RATE: 48 BPM | SYSTOLIC BLOOD PRESSURE: 137 MMHG | WEIGHT: 173 LBS | OXYGEN SATURATION: 98 % | TEMPERATURE: 99.4 F

## 2018-03-19 DIAGNOSIS — I10 ESSENTIAL HYPERTENSION: ICD-10-CM

## 2018-03-19 DIAGNOSIS — K59.09 OTHER CONSTIPATION: Primary | ICD-10-CM

## 2018-03-19 DIAGNOSIS — K21.9 GASTROESOPHAGEAL REFLUX DISEASE, ESOPHAGITIS PRESENCE NOT SPECIFIED: ICD-10-CM

## 2018-03-19 DIAGNOSIS — J44.9 CHRONIC OBSTRUCTIVE PULMONARY DISEASE, UNSPECIFIED COPD TYPE (H): ICD-10-CM

## 2018-03-19 DIAGNOSIS — M54.50 CHRONIC BILATERAL LOW BACK PAIN WITHOUT SCIATICA: Primary | ICD-10-CM

## 2018-03-19 DIAGNOSIS — I25.118 CORONARY ARTERY DISEASE OF NATIVE ARTERY OF NATIVE HEART WITH STABLE ANGINA PECTORIS (H): ICD-10-CM

## 2018-03-19 DIAGNOSIS — G89.29 CHRONIC BILATERAL LOW BACK PAIN WITHOUT SCIATICA: Primary | ICD-10-CM

## 2018-03-19 RX ORDER — METOPROLOL TARTRATE 100 MG
50 TABLET ORAL 2 TIMES DAILY
Qty: 90 TABLET | Refills: 3 | Status: SHIPPED | OUTPATIENT
Start: 2018-03-19 | End: 2019-02-28

## 2018-03-19 NOTE — PROGRESS NOTES
Medication Management Follow Up Note                                                       I am seeing Srikanth Graves for follow up from a previous visit with the pharmacy team.      Subjective                                                       Patient reports the following problems or concerns with their medications:  none    Summary of Problems Addressed::    Patient states he is using his Dulera twice daily, his Spiriva once daily, and that he has not needed his albuterol since his last visit.    Patient states he has not used the ranitidine since his last visit because he has not needed it. He describes watching what he eats because he knows the foods that make his heartburn worse (like ketchup, pepperonis on pizza, and soda).    Patient is now taking more APAP. He takes 1000 mg when he wakes up, 1000 mg around 1-2 PM, and then sometimes he will take the 1000 mg at bedtime. He doesn't take the bedtime dose if he does not have pain at the time, but he notes waking up around 1-2 AM from pain if he does not take the bedtime APAP.     Patient still using trazodone as needed for sleep. He states he uses this if he naps during the day as falling asleep again after that can be difficult. He says he cuts the tablets in half and most often only uses the 25 mg. Patient still has the diphenhydramine at home, but knows that he should no longer use it.     He also reports no longer using the sennosides/docusate as this was stopped at his last visit. He was given a new Rx for docusate, but he has not taken any since then because he has not needed it.      Objective                                                       Lab Results   Component Value Date    A1C 5.7 03/07/2018     Last Basic Metabolic Panel:  Lab Results   Component Value Date    .2 03/07/2018      Lab Results   Component Value Date    POTASSIUM 4.0 03/07/2018     Lab Results   Component Value Date    CHLORIDE 105.1 03/07/2018     Lab Results   Component  Value Date    ROCKY 9.6 03/07/2018     Lab Results   Component Value Date    CO2 24.9 03/07/2018     Lab Results   Component Value Date    BUN 20.1 03/07/2018     Lab Results   Component Value Date    CR 1.4 03/07/2018     Lab Results   Component Value Date    .0 03/07/2018     BP Readings from Last 3 Encounters:   03/19/18 137/73   03/12/18 162/78   03/07/18 153/74     Assessment                                                       COPD -  controlled     Patient has Dulera, Spiriva, and albuterol HFA.     Patient is taking Dulera and Spiriva on a daily basis but has not needed the albuterol.    Hypertension -  uncontrolled     Patient taking lisinopril 40 mg daily and metoprolol tartrate 100 mg BID.     BP in clinic today was 137/73 mmHg and HR was 48 bpm.    Patient at goal BP this visit but HR is low; exacerbated by the metoprolol    GERD -  controlled     Patient has ranitidine at home, but has not needed it.    Pain -  uncontrolled     Patient takes 1000 mg APAP up to 3 times per day.     Patient wakes from pain if he does not take the bedtime dose of APAP.     Patient discussed pain more in depth with primary.     Sleep -  controlled     He has trazodone as needed as needed for sleep.    Constipation -  controlled     Patient has docusate but has not needed it as there are no issues with irregularity.       Plan/Recommendations                                                       Completed at this visit     COPD    Continue current medications.     Hypertension    Continue lisinopril 40 mg once daily    Decrease metoprolol to 50 mg BID.     GERD    Continue current medications.     Pain    Defer to primary care physician.     Sleep     Continue current medication.    Constipation    Continue current medication.     To be completed at a future visit  Hypertension    Reassess BP and HR control at next visit after decreasing metoprolol.     Follow-up                                                        Patient should follow up in 2 weeks with Dr. Pastor.      Dr. Pastor was provided the recommendations above  in clinic today and Dr. Pastor was available for supervision during this visit and is the authorizing prescriber for this visit through the pharmacist collaborative practice agreement.    Gabriela ArtD Student    Drug therapy problems identified  1. Med: metoprolol - Safety - dose too high - Resolution: Change dose; resolved    # of medical conditions addressed: 9  # of medications addressed: 6  # of medication discrepancies identified: did not address  # of DTP identified: 1  Time spent: 20 minutes  Level of service: 2    The student acted as scribe and the encounter documented was completely performed by myself. I have reviewed and verified the student s documentation and found it to be correct and complete.  Daja Luna, Pharm.D.

## 2018-03-19 NOTE — PATIENT INSTRUCTIONS
I'm happy to say your pain on the right side, is not from your kidneys.  As I mentioned in your visit, all of your numbers are Great!  I want you to  a prescription, for Lidocaine patch to help with the muscle pain on your right side.  Drink more water, move your body, Stretching arms up or down and take your vitamins.  Use the Tens unit as needed for your back.  Follow up with me in 3-6 months  Understanding Lumbar Radiculopathy    Lumbar radiculopathy is irritation or inflammation of a nerve root in the low back. It causes symptoms that spread out from the back down one or both legs. To understand this condition, it helps to understand the parts of the spine:    Vertebrae. These are bones that stack to form the spine. The lumbar spine contains the 5 bottom vertebrae.    Disks. These are soft pads of tissue between the vertebrae. They act as shock absorbers for the spine.    Spinal canal. This is a tunnel formed within the stacked vertebrae. In the lumbar spine, nerves run through this canal.    Nerves. These branch off and leave the spinal canal, traveling out to parts of the body. As they leave the spinal canal, nerves pass through openings between the vertebrae. The nerve root is the part of the nerve that is closest to the spinal canal.    Sciatic nerve. This is a large nerve formed from several nerve roots in the low back. This nerve extends down the back of the leg to the foot.  With lumbar radiculopathy, nerve roots in the low back become irritated. This leads to pain and symptoms. The sciatic nerve is commonly involved, so the condition is often called sciatica.  What causes lumbar radiculopathy?  Aging, injury, poor posture, extra body weight, and other issues can lead to problems in the low back. These problems may then irritate nerve roots. They include:    Damage to a disk in the lumbar spine. The damaged disk may then press on nearby nerve roots.    Degeneration from wear and tear, and aging.  This can lead to narrowing (stenosis) of the openings between the vertebrae. The narrowed openings press on nerve roots as they leave the spinal canal.    Unstable spine. This is when a vertebra slips forward. It can then press on a nerve root.  Other, less common things can put pressure on nerves in the low back. These include diabetes, infection, or a tumor.  Symptoms of lumbar radiculopathy  These include:    Pain in the low back    Pain, numbness, tingling, or weakness that travels into the buttocks, hip, groin, or leg    Muscle spasms  Treatment for lumbar radiculopathy  In most cases, your healthcare provider will first try treatments that help relieve symptoms. These may include:    Prescription and over-the-counter pain medicines. These help relieve pain, swelling, and irritation.    Limits on positions and activities that increase pain. But lying in bed or avoiding all movement is only recommended for a short period of time.    Physical therapy, including exercises and stretches. This helps decrease pain and increase movement and function.    Steroid shots into the lower back. This may help relieve symptoms for a time.    Weight-loss program. If you are overweight, losing extra pounds may help relieve symptoms.  In some cases, you may need surgery to fix the underlying problem. This depends on the cause, the symptoms, and how long the pain has lasted.  Possible complications  Over time, an irritated and inflamed nerve may become damaged. This may lead to long-lasting (permanent) numbness or weakness in your legs and feet. If symptoms change suddenly or get worse, be sure to let your healthcare provider know.  When to call your healthcare provider  Call your healthcare provider right away if you have any of these:    New pain or pain that gets worse    New or increasing weakness, tingling, or numbness in your leg or foot    Problems controlling your bladder or bowel   Date Last Reviewed: 3/10/2016     9093-1774 [employer]. [address] [place] [address], [place] [address]. All rights reserved. This information is not intended as a substitute for professional medical care. Always follow your healthcare professional's instructions.        Exercises to Strengthen Your Lower Back  Strong lower back and abdominal muscles work together to support your spine. The exercises below will help strengthen the lower back. It is important that you begin exercising slowly and increase levels gradually.  Always begin any exercise program with stretching. If you feel pain while doing any of these exercises, stop and talk to your doctor about a more specific exercise program that better suits your condition.   Low back stretch  The point of stretching is to make you more flexible and increase your range of motion. Stretch only as much as you are able. Stretch slowly. Do not push your stretch to the limit. If at any point you feel pain while stretching, this is your (temporary) limit.    Lie on your back with your knees bent and both feet on the ground.    Slowly raise your left knee to your chest as you flatten your lower back against the floor. Hold for 5 seconds.    Relax and repeat the exercise with your right knee.    Do 10 of these exercises for each leg.    Repeat hugging both knees to your chest at the same time.  Building lower back strength  Start your exercise routine with 10 to 30 minutes a day, 1 to 3 times a day.  Initial exercises  Lying on your back:  1. Ankle pumps: Move your foot up and down, towards your head, and then away. Repeat 10 times with each foot.  2. Heel slides: Slowly bend your knee, drawing the heel of your foot towards you. Then slide your heel/foot from you, straightening your knee. Do not lift your foot off the floor (this is not a leg lift).  3. Abdominal contraction: Bend your knees and put your hands on your stomach. Tighten your stomach muscles. Hold for 5 seconds, then relax. Repeat 10  times.  4. Straight leg raise: Bend one leg at the knee and keep the other leg straight. Tighten your stomach muscles. Slowly lift your straight leg 6 to 12 inches off the floor and hold for up to 5 seconds. Repeat 10 times on each side.  Standin. Wall squats: Stand with your back against the wall. Move your feet about 12 inches away from the wall. Tighten your stomach muscles, and slowly bend your knees until they are at about a 45 degree angle. Do not go down too far. Hold about 5 seconds. Then slowly return to your starting position. Repeat 10 times.  2. Heel raises: Stand facing the wall. Slowly raise the heels of your feet up and down, while keeping your toes on the floor. If you have trouble balancing, you can touch the wall with your hands. Repeat 10 times.  More advanced exercises  When you feel comfortable enough, try these exercises.  1. Kneeling lumbar extension: Begin on your hands and knees. At the same time, raise and straighten your right arm and left leg until they are parallel to the ground. Hold for 2 seconds and come back slowly to a starting position. Repeat with left arm and right leg, alternating 10 times.  2. Prone lumbar extension: Lie face down, arms extended overhead, palms on the floor. At the same time, raise your right arm and left leg as high as comfortably possible. Hold for 10 seconds and slowly return to start. Repeat with left arm and right leg, alternating 10 times. Gradually build up to 20 times. (Advanced: Repeat this exercise raising both arms and both legs a few inches off the floor at the same time. Hold for 5 seconds and release.)  3. Pelvic tilt: Lie on the floor on your back with your knees bent at 90 degrees. Your feet should be flat on the floor. Inhale, exhale, then slowly contract your abdominal muscles bringing your navel toward your spine. Let your pelvis rock back until your lower back is flat on the floor. Hold for 10 seconds while breathing  smoothly.  4. Abdominal crunch: Perform a pelvic tilt (above) flattening your lower back against the floor. Holding the tension in your abdominal muscles, take another breath and raise your shoulder blades off the ground (this is not a full sit-up). Keep your head in line with your body (don t bend your neck forward). Hold for 2 seconds, then slowly lower.  Date Last Reviewed: 6/1/2016 2000-2017 [employer]. [address] [place] [address], [place] [address]. All rights reserved. This information is not intended as a substitute for professional medical care. Always follow your healthcare professional's instructions.        Aerobic Exercise for a Healthy Heart  Exercise is a lot more than an energy booster and a stress reliever. It also strengthens your heart muscle, lowers your blood pressure and cholesterol, and burns calories. It can also improve your resting muscle tone, and your mood.     Remember, some activity is better than none.    Choose an aerobic activity  Choose an activity that makes your heart and lungs work harder than they do when you rest or walk normally. This aerobic exercise can improve the way your heart and other muscles use oxygen. Make it fun by exercising with a friend and choosing an activity you enjoy. Here are some ideas:    Walking    Swimming    Bicycling    Stair climbing    Dancing    Jogging    Gardening  Exercise regularly  If you haven t been exercising regularly,  get your doctor s OK first. Then start slowly.  Here are some tips:    Begin exercising 3 times a week for 5 to 10 minutes at a time.    When you feel comfortable, add a few minutes each session.    Slowly build up to exercising 3 to 4 times each week. Each session should last for 40 minutes, on average, and involve moderate- to vigorous-intensity physical activity.    If you have been given nitroglycerin, be sure to carry it when you exercise.    If you get chest pain (angina) when you re exercising, stop what  you re doing, take your nitroglycerin, and call your doctor.  Date Last Reviewed: 6/2/2016 2000-2017 [employer]. [address] [place] [address], [place] [address]. All rights reserved. This information is not intended as a substitute for professional medical care. Always follow your healthcare professional's instructions.

## 2018-03-19 NOTE — PROGRESS NOTES
S: Srikanth Graves is a 72 year old male who returns for follow up of  Medication reconciliaiton  Patients states that main concern today is  Request for TEN's unit and topical pain mediations  Low marginal Heart rate while on Betablocker prescribes for CAD  PMHX/PSHX/MEDS/ALLERGIES/SHX/FHX reviewed and updated in Epic.      ROS:  General: No fevers, chills  Head: No headache  Ears: No acute change in hearing.    CV: No chest pain or palpitations.  Resp: No shortness of breath.  No cough. No hemoptysis.  GI: No nausea, vomiting, constipation, diarrhea  : No urinary pains    O: /73  Pulse (!) 48  Temp 99.4  F (37.4  C)  Resp 16  Wt 173 lb (78.5 kg)  SpO2 98%   Gen:  Well nourished and in NAD    Neck: supple without lymphadenopathy  CV:  RRR  - no murmurs, rubs, or gallups,   Pulm:  CTAB, no wheezes/rales/rhonchi, good air entry   ABD: soft, nontender, no masses, no rebound, BS intact throughout  Extrem: no cyanosis, edema or clubbing  Psych: Euthymic      (M54.5,  G89.29) Chronic bilateral low back pain without sciatica  (primary encounter diagnosis)  Comment: Mechanical,   Plan: order for DME- TENs  Unit   Topical lidocaine gel if covered by insurance     2 Metabolic syndrome: Labs are ok: for BMP, diabetes and  Vitamin d level      3  CAD: stable    4 HTN; monitor adjust meds        RTC  3 to 6 months- metabolic syndrome   or sooner if develops new or worsening symptoms.    Rohit Pastor

## 2018-03-19 NOTE — MR AVS SNAPSHOT
After Visit Summary   3/19/2018    Srikanth Graves    MRN: [id number]           Patient Information     Date Of Birth          1945        Visit Information        Provider Department      3/19/2018 1:30 PM Rohit Pastor MD [facility]        Today's Diagnoses     Chronic bilateral low back pain without sciatica    -  1      Care Instructions      I'm happy to say your pain on the right side, is not from your kidneys.  As I mentioned in your visit, all of your numbers are Great!  I want you to  a prescription, for Lidocaine patch to help with the muscle pain on your right side.  Drink more water, move your body, Stretching arms up or down and take your vitamins.  Use the Tens unit as needed for your back.  Follow up with me in 3-6 months  Understanding Lumbar Radiculopathy    Lumbar radiculopathy is irritation or inflammation of a nerve root in the low back. It causes symptoms that spread out from the back down one or both legs. To understand this condition, it helps to understand the parts of the spine:    Vertebrae. These are bones that stack to form the spine. The lumbar spine contains the 5 bottom vertebrae.    Disks. These are soft pads of tissue between the vertebrae. They act as shock absorbers for the spine.    Spinal canal. This is a tunnel formed within the stacked vertebrae. In the lumbar spine, nerves run through this canal.    Nerves. These branch off and leave the spinal canal, traveling out to parts of the body. As they leave the spinal canal, nerves pass through openings between the vertebrae. The nerve root is the part of the nerve that is closest to the spinal canal.    Sciatic nerve. This is a large nerve formed from several nerve roots in the low back. This nerve extends down the back of the leg to the foot.  With lumbar radiculopathy, nerve roots in the low back become irritated. This leads to pain and symptoms. The sciatic nerve is commonly involved, so the  condition is often called sciatica.  What causes lumbar radiculopathy?  Aging, injury, poor posture, extra body weight, and other issues can lead to problems in the low back. These problems may then irritate nerve roots. They include:    Damage to a disk in the lumbar spine. The damaged disk may then press on nearby nerve roots.    Degeneration from wear and tear, and aging. This can lead to narrowing (stenosis) of the openings between the vertebrae. The narrowed openings press on nerve roots as they leave the spinal canal.    Unstable spine. This is when a vertebra slips forward. It can then press on a nerve root.  Other, less common things can put pressure on nerves in the low back. These include diabetes, infection, or a tumor.  Symptoms of lumbar radiculopathy  These include:    Pain in the low back    Pain, numbness, tingling, or weakness that travels into the buttocks, hip, groin, or leg    Muscle spasms  Treatment for lumbar radiculopathy  In most cases, your healthcare provider will first try treatments that help relieve symptoms. These may include:    Prescription and over-the-counter pain medicines. These help relieve pain, swelling, and irritation.    Limits on positions and activities that increase pain. But lying in bed or avoiding all movement is only recommended for a short period of time.    Physical therapy, including exercises and stretches. This helps decrease pain and increase movement and function.    Steroid shots into the lower back. This may help relieve symptoms for a time.    Weight-loss program. If you are overweight, losing extra pounds may help relieve symptoms.  In some cases, you may need surgery to fix the underlying problem. This depends on the cause, the symptoms, and how long the pain has lasted.  Possible complications  Over time, an irritated and inflamed nerve may become damaged. This may lead to long-lasting (permanent) numbness or weakness in your legs and feet. If symptoms  change suddenly or get worse, be sure to let your healthcare provider know.  When to call your healthcare provider  Call your healthcare provider right away if you have any of these:    New pain or pain that gets worse    New or increasing weakness, tingling, or numbness in your leg or foot    Problems controlling your bladder or bowel   Date Last Reviewed: 3/10/2016    8677-8512 [employer]. [address] [place] [address], [place] [address]. All rights reserved. This information is not intended as a substitute for professional medical care. Always follow your healthcare professional's instructions.        Exercises to Strengthen Your Lower Back  Strong lower back and abdominal muscles work together to support your spine. The exercises below will help strengthen the lower back. It is important that you begin exercising slowly and increase levels gradually.  Always begin any exercise program with stretching. If you feel pain while doing any of these exercises, stop and talk to your doctor about a more specific exercise program that better suits your condition.   Low back stretch  The point of stretching is to make you more flexible and increase your range of motion. Stretch only as much as you are able. Stretch slowly. Do not push your stretch to the limit. If at any point you feel pain while stretching, this is your (temporary) limit.    Lie on your back with your knees bent and both feet on the ground.    Slowly raise your left knee to your chest as you flatten your lower back against the floor. Hold for 5 seconds.    Relax and repeat the exercise with your right knee.    Do 10 of these exercises for each leg.    Repeat hugging both knees to your chest at the same time.  Building lower back strength  Start your exercise routine with 10 to 30 minutes a day, 1 to 3 times a day.  Initial exercises  Lying on your back:  1. Ankle pumps: Move your foot up and down, towards your head, and then away. Repeat 10 times  with each foot.  2. Heel slides: Slowly bend your knee, drawing the heel of your foot towards you. Then slide your heel/foot from you, straightening your knee. Do not lift your foot off the floor (this is not a leg lift).  3. Abdominal contraction: Bend your knees and put your hands on your stomach. Tighten your stomach muscles. Hold for 5 seconds, then relax. Repeat 10 times.  4. Straight leg raise: Bend one leg at the knee and keep the other leg straight. Tighten your stomach muscles. Slowly lift your straight leg 6 to 12 inches off the floor and hold for up to 5 seconds. Repeat 10 times on each side.  Standin. Wall squats: Stand with your back against the wall. Move your feet about 12 inches away from the wall. Tighten your stomach muscles, and slowly bend your knees until they are at about a 45 degree angle. Do not go down too far. Hold about 5 seconds. Then slowly return to your starting position. Repeat 10 times.  2. Heel raises: Stand facing the wall. Slowly raise the heels of your feet up and down, while keeping your toes on the floor. If you have trouble balancing, you can touch the wall with your hands. Repeat 10 times.  More advanced exercises  When you feel comfortable enough, try these exercises.  1. Kneeling lumbar extension: Begin on your hands and knees. At the same time, raise and straighten your right arm and left leg until they are parallel to the ground. Hold for 2 seconds and come back slowly to a starting position. Repeat with left arm and right leg, alternating 10 times.  2. Prone lumbar extension: Lie face down, arms extended overhead, palms on the floor. At the same time, raise your right arm and left leg as high as comfortably possible. Hold for 10 seconds and slowly return to start. Repeat with left arm and right leg, alternating 10 times. Gradually build up to 20 times. (Advanced: Repeat this exercise raising both arms and both legs a few inches off the floor at the same time. Hold  for 5 seconds and release.)  3. Pelvic tilt: Lie on the floor on your back with your knees bent at 90 degrees. Your feet should be flat on the floor. Inhale, exhale, then slowly contract your abdominal muscles bringing your navel toward your spine. Let your pelvis rock back until your lower back is flat on the floor. Hold for 10 seconds while breathing smoothly.  4. Abdominal crunch: Perform a pelvic tilt (above) flattening your lower back against the floor. Holding the tension in your abdominal muscles, take another breath and raise your shoulder blades off the ground (this is not a full sit-up). Keep your head in line with your body (don t bend your neck forward). Hold for 2 seconds, then slowly lower.  Date Last Reviewed: 6/1/2016 2000-2017 [employer]. [address] [place], [place] [id number]. All rights reserved. This information is not intended as a substitute for professional medical care. Always follow your healthcare professional's instructions.        Aerobic Exercise for a Healthy Heart  Exercise is a lot more than an energy booster and a stress reliever. It also strengthens your heart muscle, lowers your blood pressure and cholesterol, and burns calories. It can also improve your resting muscle tone, and your mood.     Remember, some activity is better than none.    Choose an aerobic activity  Choose an activity that makes your heart and lungs work harder than they do when you rest or walk normally. This aerobic exercise can improve the way your heart and other muscles use oxygen. Make it fun by exercising with a friend and choosing an activity you enjoy. Here are some ideas:    Walking    Swimming    Bicycling    Stair climbing    Dancing    Jogging    Gardening  Exercise regularly  If you haven t been exercising regularly,  get your doctor s OK first. Then start slowly.  Here are some tips:    Begin exercising 3 times a week for 5 to 10 minutes at a time.    When you feel comfortable,  add a few minutes each session.    Slowly build up to exercising 3 to 4 times each week. Each session should last for 40 minutes, on average, and involve moderate- to vigorous-intensity physical activity.    If you have been given nitroglycerin, be sure to carry it when you exercise.    If you get chest pain (angina) when you re exercising, stop what you re doing, take your nitroglycerin, and call your doctor.  Date Last Reviewed: 2016-2017 [employer]. [address] [place] [address]. All rights reserved. This information is not intended as a substitute for professional medical care. Always follow your healthcare professional's instructions.                Follow-ups after your visit        Who to contact     Please call your clinic at [phone] to:    Ask questions about your health    Make or cancel appointments    Discuss your medicines    Learn about your test results    Speak to your doctor            Additional Information About Your Visit        [employer] Information     [email or web address] is an electronic gateway that provides easy, online access to your medical records. With [email or web address], you can request a clinic appointment, read your test results, renew a prescription or communicate with your care team.     To sign up for [email or web address] visit the website at [email or web address]   You will be asked to enter the access code listed below, as well as some personal information. Please follow the directions to create your username and password.     Your access code is: [email or web address]  Expires: 2018 12:03 PM     Your access code will  in 90 days. If you need help or a new code, please contact your [facility] or call [phone] for assistance.        Care EveryWhere ID     This is your Care EveryWhere ID. This could be used by other organizations to access your [place] medical records  [id number]        Your Vitals Were     Pulse Temperature  Respirations Pulse Oximetry          48 99.4  F (37.4  C) 16 98%         Blood Pressure from Last 3 Encounters:   03/19/18 137/73   03/12/18 162/78   03/07/18 153/74    Weight from Last 3 Encounters:   03/19/18 173 lb (78.5 kg)   03/12/18 176 lb 6.4 oz (80 kg)   03/07/18 176 lb 12.8 oz (80.2 kg)              Today, you had the following     No orders found for display       Primary Care Provider Office Phone # Fax #    Rohit Pastor [phone] [address] [facility] [address]        Equal Access to Services     [facility] Hospital[facility]OREEN : Hadii lily Sanabria, yeyo chaudhary, ambreen gann, evens carvajal . So [facility] [phone].    ATENCIÓN: Si habla español, tiene a andres disposición servicios gratuitos de asistencia lingüística. Llame al [phone].    We comply with applicable federal civil rights laws and [place] laws. We do not discriminate on the basis of race, color, national origin, age, disability, sex, sexual orientation, or gender identity.            Thank you!     Thank you for choosing [facility]  for your care. Our goal is always to provide you with excellent care. Hearing back from our patients is one way we can continue to improve our services. Please take a few minutes to complete the written survey that you may receive in the mail after your visit with us. Thank you!             Your Updated Medication List - Protect others around you: Learn how to safely use, store and throw away your medicines at www.disposemymeds.org.          This list is accurate as of 3/19/18  2:17 PM.  Always use your most recent med list.                   Brand Name Dispense Instructions for use Diagnosis    acetaminophen 500 MG tablet    TYLENOL    180 tablet    Take 2 tablets (1,000 mg) by mouth 3 times daily    Chronic low back pain, unspecified back pain laterality, with sciatica presence unspecified       albuterol 108 (90 BASE) MCG/ACT Inhaler    PROAIR  HFA/PROVENTIL HFA/VENTOLIN HFA    3 Inhaler    INHALE TWO PUFFS BY MOUTH FOUR TIMES A DAY AS NEEDED    Chronic obstructive pulmonary disease, unspecified COPD type (H)       aspirin 81 MG tablet     90 tablet    Take 1 tablet (81 mg) by mouth daily    Coronary artery disease of native artery of native heart with stable angina pectoris (H)       atorvastatin 20 MG tablet    LIPITOR    90 tablet    Take 1 tablet (20 mg) by mouth daily    Coronary artery disease of native artery of native heart with stable angina pectoris (H)       clopidogrel 75 MG tablet    PLAVIX    90 tablet    Take 1 tablet (75 mg) by mouth daily    Coronary artery disease of native artery of native heart with stable angina pectoris (H)       docusate sodium 100 MG tablet    COLACE    60 tablet    Take 100 mg by mouth 2 times daily as needed for constipation    Other constipation       dorzolamide-timolol 2-0.5 % ophthalmic solution    COSOPT    1 Bottle    PLACE 1 DROP IN THE RIGHT EYE ONCE DAILY    Primary open angle glaucoma of right eye, mild stage       furosemide 20 MG tablet    LASIX    90 tablet    Take 1 tablet (20 mg) by mouth daily    Essential hypertension       HYDROCERIN Crea     180 g    Apply topically 3 times daily as needed    Dry skin       isosorbide mononitrate 30 MG 24 hr tablet    IMDUR    30 tablet    Take 1 tablet (30 mg) by mouth daily        lidocaine 5 % ointment    XYLOCAINE    30 g    Apply to affected area three times a day as needed    Chronic low back pain, unspecified back pain laterality, with sciatica presence unspecified       lisinopril 40 MG tablet    PRINIVIL/ZESTRIL    90 tablet    Take 1 tablet (40 mg) by mouth daily    Essential hypertension       metoprolol tartrate 100 MG tablet    LOPRESSOR    180 tablet    Take 1 tablet (100 mg) by mouth 2 times daily    Essential hypertension       mometasone-formoterol 200-5 MCG/ACT oral inhaler    DULERA    3 Inhaler    INHALE TWO PUFFS BY MOUTH TWICE A DAY     Chronic obstructive pulmonary disease, unspecified COPD type (H)       nitroGLYcerin 0.4 MG/SPRAY spray    NITROLINGUAL     DISSOLVE 1 SPRAY UNDER TONGUE EVERY 5 MINUTES AS NEEDED FOR CHEST PAIN        polyvinyl alcohol 1.4 % ophthalmic solution    LIQUIFILM TEARS    15 mL    Place 1 drop into the right eye as needed for dry eyes    Dry eyes       potassium chloride SA 10 MEQ CR tablet    K-DUR/KLOR-CON M    180 tablet    Take 2 tablets (20 mEq) by mouth daily    Hypokalemia       ranitidine 150 MG tablet    ZANTAC    90 tablet    Take 1 tablet (150 mg) by mouth daily    Gastroesophageal reflux disease, esophagitis presence not specified       ranolazine 500 MG 12 hr tablet    RANEXA     Take 1 tablet (500 mg) by mouth 2 times daily        sertraline 50 MG tablet    ZOLOFT    90 tablet    Take 1 tablet (50 mg) by mouth daily    Depression, unspecified depression type       sodium chloride 0.65 % nasal spray    OCEAN    3 Bottle    Spray in each nostril 3-4 times daily as needed    Preventive measure       TAB-A-MARZENA Tabs     90 tablet    Take 1 tablet by mouth daily    Routine adult health maintenance       tiotropium 18 MCG capsule    SPIRIVA    90 capsule    Inhale 1 capsule (18 mcg) into the lungs daily    Chronic obstructive pulmonary disease, unspecified COPD type (H)       traZODone 50 MG tablet    DESYREL    180 tablet    Take 1/2 to 2 tablets by mouth at bedtime as needed for sleep.    Insomnia, unspecified type       vitamin D 2000 UNITS tablet     100 tablet    Take 2,000 Units by mouth daily    Preventive measure

## 2018-03-19 NOTE — MR AVS SNAPSHOT
After Visit Summary   3/19/2018    Srikanth Graves    MRN: [id number]           Patient Information     Date Of Birth          1945        Visit Information        Provider Department      3/19/2018 1:50 PM Daja Luna, [facility]        Today's Diagnoses     Other constipation    -  1    Essential hypertension        Chronic obstructive pulmonary disease, unspecified COPD type (H)        Coronary artery disease of native artery of native heart with stable angina pectoris (H)        Gastroesophageal reflux disease, esophagitis presence not specified           Follow-ups after your visit        Your next 10 appointments already scheduled     2018  1:50 PM CDT   Return Visit with Rohit Pastor MD   [facility] ([facility])    [address] [place] [address]   [phone]              Who to contact     Please call your clinic at [phone] to:    Ask questions about your health    Make or cancel appointments    Discuss your medicines    Learn about your test results    Speak to your doctor            Additional Information About Your Visit        MyChart Information     [employer] is an electronic gateway that provides easy, online access to your medical records. With [employer], you can request a clinic appointment, read your test results, renew a prescription or communicate with your care team.     To sign up for [employer] visit the website at [email or web address]   You will be asked to enter the access code listed below, as well as some personal information. Please follow the directions to create your username and password.     Your access code is: [email or web address]  Expires: 2018 12:03 PM     Your access code will  in 90 days. If you need help or a new code, please contact your [facility] or call [phone] for assistance.        Care EveryWhere ID     This is your Care EveryWhere ID. This could be used by other  organizations to access your [facility] medical records  [id number]         Blood Pressure from Last 3 Encounters:   03/19/18 137/73   03/12/18 162/78   03/07/18 153/74    Weight from Last 3 Encounters:   03/19/18 173 lb (78.5 kg)   03/12/18 176 lb 6.4 oz (80 kg)   03/07/18 176 lb 12.8 oz (80.2 kg)              We Performed the Following     MTM, EA ADDITIONAL 15 MIN (95962) x 1 (= 15 min.)          Today's Medication Changes          These changes are accurate as of 3/19/18 11:59 PM.  If you have any questions, ask your nurse or doctor.               Start taking these medicines.        Dose/Directions    order for DME   Used for:  Chronic bilateral low back pain without sciatica   Started by:  Rohit Pastor MD        TENS unit   Quantity:  1 Units   Refills:  0         These medicines have changed or have updated prescriptions.        Dose/Directions    metoprolol tartrate 100 MG tablet   Commonly known as:  LOPRESSOR   This may have changed:  how much to take   Used for:  Essential hypertension   Changed by:  Daja Luna, [facility]        Dose:  50 mg   Take 0.5 tablets (50 mg) by mouth 2 times daily   Quantity:  90 tablet   Refills:  3            Where to get your medicines      These medications were sent to [facility] [place], [place] - [address], [place] [address]     Phone:  [phone]     metoprolol tartrate 100 MG tablet         Some of these will need a paper prescription and others can be bought over the counter.  Ask your nurse if you have questions.     Bring a paper prescription for each of these medications     order for DME                Primary Care Provider Office Phone # Fax #    Rohit Pastor [phone]       [address] [place] [address]        Equal Access to Services     STEPHANIE FLOWERS AH: yeyo Hanson, evens vora. So [facility]  [phone].    ATENCIÓN: Si ethan livingston, tiene a andres disposición servicios gratuitos de asistencia lingüística. Edwar kaur [phone].    We comply with applicable federal civil rights laws and [place] laws. We do not discriminate on the basis of race, color, national origin, age, disability, sex, sexual orientation, or gender identity.            Thank you!     Thank you for choosing [facility]  for your care. Our goal is always to provide you with excellent care. Hearing back from our patients is one way we can continue to improve our services. Please take a few minutes to complete the written survey that you may receive in the mail after your visit with us. Thank you!             Your Updated Medication List - Protect others around you: Learn how to safely use, store and throw away your medicines at www.disposemymeds.org.          This list is accurate as of 3/19/18 11:59 PM.  Always use your most recent med list.                   Brand Name Dispense Instructions for use Diagnosis    acetaminophen 500 MG tablet    TYLENOL    180 tablet    Take 2 tablets (1,000 mg) by mouth 3 times daily    Chronic low back pain, unspecified back pain laterality, with sciatica presence unspecified       albuterol 108 (90 BASE) MCG/ACT Inhaler    PROAIR HFA/PROVENTIL HFA/VENTOLIN HFA    3 Inhaler    INHALE TWO PUFFS BY MOUTH FOUR TIMES A DAY AS NEEDED    Chronic obstructive pulmonary disease, unspecified COPD type (H)       aspirin 81 MG tablet     90 tablet    Take 1 tablet (81 mg) by mouth daily    Coronary artery disease of native artery of native heart with stable angina pectoris (H)       atorvastatin 20 MG tablet    LIPITOR    90 tablet    Take 1 tablet (20 mg) by mouth daily    Coronary artery disease of native artery of native heart with stable angina pectoris (H)       clopidogrel 75 MG tablet    PLAVIX    90 tablet    Take 1 tablet (75 mg) by mouth daily    Coronary artery disease of native artery of native  heart with stable angina pectoris (H)       docusate sodium 100 MG tablet    COLACE    60 tablet    Take 100 mg by mouth 2 times daily as needed for constipation    Other constipation       dorzolamide-timolol 2-0.5 % ophthalmic solution    COSOPT    1 Bottle    PLACE 1 DROP IN THE RIGHT EYE ONCE DAILY    Primary open angle glaucoma of right eye, mild stage       furosemide 20 MG tablet    LASIX    90 tablet    Take 1 tablet (20 mg) by mouth daily    Essential hypertension       HYDROCERIN Crea     180 g    Apply topically 3 times daily as needed    Dry skin       isosorbide mononitrate 30 MG 24 hr tablet    IMDUR    30 tablet    Take 1 tablet (30 mg) by mouth daily        lidocaine 5 % ointment    XYLOCAINE    30 g    Apply to affected area three times a day as needed    Chronic low back pain, unspecified back pain laterality, with sciatica presence unspecified       lisinopril 40 MG tablet    PRINIVIL/ZESTRIL    90 tablet    Take 1 tablet (40 mg) by mouth daily    Essential hypertension       metoprolol tartrate 100 MG tablet    LOPRESSOR    90 tablet    Take 0.5 tablets (50 mg) by mouth 2 times daily    Essential hypertension       mometasone-formoterol 200-5 MCG/ACT oral inhaler    DULERA    3 Inhaler    INHALE TWO PUFFS BY MOUTH TWICE A DAY    Chronic obstructive pulmonary disease, unspecified COPD type (H)       nitroGLYcerin 0.4 MG/SPRAY spray    NITROLINGUAL     DISSOLVE 1 SPRAY UNDER TONGUE EVERY 5 MINUTES AS NEEDED FOR CHEST PAIN        order for DME     1 Units    TENS unit    Chronic bilateral low back pain without sciatica       polyvinyl alcohol 1.4 % ophthalmic solution    LIQUIFILM TEARS    15 mL    Place 1 drop into the right eye as needed for dry eyes    Dry eyes       potassium chloride SA 10 MEQ CR tablet    K-DUR/KLOR-CON M    180 tablet    Take 2 tablets (20 mEq) by mouth daily    Hypokalemia       ranitidine 150 MG tablet    ZANTAC    90 tablet    Take 1 tablet (150 mg) by mouth daily     Gastroesophageal reflux disease, esophagitis presence not specified       ranolazine 500 MG 12 hr tablet    RANEXA     Take 1 tablet (500 mg) by mouth 2 times daily        sertraline 50 MG tablet    ZOLOFT    90 tablet    Take 1 tablet (50 mg) by mouth daily    Depression, unspecified depression type       sodium chloride 0.65 % nasal spray    OCEAN    3 Bottle    Spray in each nostril 3-4 times daily as needed    Preventive measure       TAB-A-MARZENA Tabs     90 tablet    Take 1 tablet by mouth daily    Routine adult health maintenance       tiotropium 18 MCG capsule    SPIRIVA    90 capsule    Inhale 1 capsule (18 mcg) into the lungs daily    Chronic obstructive pulmonary disease, unspecified COPD type (H)       traZODone 50 MG tablet    DESYREL    180 tablet    Take 1/2 to 2 tablets by mouth at bedtime as needed for sleep.    Insomnia, unspecified type       vitamin D 2000 UNITS tablet     100 tablet    Take 2,000 Units by mouth daily    Preventive measure

## 2018-03-21 ENCOUNTER — TELEPHONE (OUTPATIENT)
Dept: FAMILY MEDICINE | Facility: CLINIC | Age: 73
End: 2018-03-21

## 2018-04-02 ENCOUNTER — OFFICE VISIT (OUTPATIENT)
Dept: FAMILY MEDICINE | Facility: CLINIC | Age: 73
End: 2018-04-02
Payer: COMMERCIAL

## 2018-04-02 ENCOUNTER — RECORDS - HEALTHEAST (OUTPATIENT)
Dept: ADMINISTRATIVE | Facility: OTHER | Age: 73
End: 2018-04-02

## 2018-04-02 VITALS
TEMPERATURE: 98.8 F | DIASTOLIC BLOOD PRESSURE: 79 MMHG | SYSTOLIC BLOOD PRESSURE: 167 MMHG | HEART RATE: 60 BPM | OXYGEN SATURATION: 95 % | WEIGHT: 174.6 LBS | RESPIRATION RATE: 28 BRPM

## 2018-04-02 DIAGNOSIS — I10 ESSENTIAL HYPERTENSION: Primary | ICD-10-CM

## 2018-04-02 DIAGNOSIS — M54.5 CHRONIC LOW BACK PAIN, UNSPECIFIED BACK PAIN LATERALITY, WITH SCIATICA PRESENCE UNSPECIFIED: ICD-10-CM

## 2018-04-02 DIAGNOSIS — G89.29 CHRONIC BILATERAL LOW BACK PAIN WITHOUT SCIATICA: ICD-10-CM

## 2018-04-02 DIAGNOSIS — M54.50 CHRONIC BILATERAL LOW BACK PAIN WITHOUT SCIATICA: ICD-10-CM

## 2018-04-02 DIAGNOSIS — G89.29 CHRONIC LOW BACK PAIN, UNSPECIFIED BACK PAIN LATERALITY, WITH SCIATICA PRESENCE UNSPECIFIED: ICD-10-CM

## 2018-04-02 DIAGNOSIS — M21.619 BUNION OF GREAT TOE: ICD-10-CM

## 2018-04-02 NOTE — PROGRESS NOTES
"S: Srikanth Graves is a 72 year old male who returns for follow up of  HTN , low heart rate, medications adjustments  Patients states that main concern today is Med adjustment and l FU low heart rate    PMHX/PSHX/MEDS/ALLERGIES/SHX/FHX reviewed and updated in Epic.      ROS:  General: No fevers, chills  Head: No headache  Ears: No acute change in hearing.    CV: No chest pain or palpitations.  Resp: No shortness of breath.  No cough. No hemoptysis.  GI: No nausea, vomiting, constipation, diarrhea  : No urinary pains    O: /78  Pulse 60  Temp 98.8  F (37.1  C) (Oral)  Resp 28  Wt 174 lb 9.6 oz (79.2 kg)  SpO2 95%   Gen:  Well nourished and in NAD    Neck: supple without lymphadenopathy  CV:  RRR  - no murmurs, rubs, or gallups,   Pulm:  CTAB, no wheezes/rales/rhonchi, good air entry   ABD: soft, nontender, no masses, no rebound, BS intact throughout  Extrem: no cyanosis, edema or clubbing  Psych: Euthymic      1 HTN: not in target   Patient did not take his medications today   \" wanted to have my Blood Pressure check without medications\"    Not in target   Needs to take meds   2 Bradycardia/resolved with decreased beta blockers meds  3 Hx of edema; Recent echo/no CHF Has CAD/anf us with cardiology every seen    no leg swelling, will continue lasix  for now   3 Back pain: tylenol, vitamin D, TENs unit  4 Severe hallus valgus; podiatry has seen patient , conservative care      RTC in 2 weeks, for follow up of HTN  check or sooner if develops new or worsening symptoms.    Rohit Pastor      "

## 2018-04-02 NOTE — PATIENT INSTRUCTIONS
1 Take medications even the day of seeing the doctor  2  1  To 2 week  for recheck blood pressure  3   TENTs, unit

## 2018-04-17 ENCOUNTER — OFFICE VISIT (OUTPATIENT)
Dept: PHARMACY | Facility: CLINIC | Age: 73
End: 2018-04-17
Payer: COMMERCIAL

## 2018-04-17 ENCOUNTER — OFFICE VISIT (OUTPATIENT)
Dept: FAMILY MEDICINE | Facility: CLINIC | Age: 73
End: 2018-04-17
Payer: COMMERCIAL

## 2018-04-17 VITALS
SYSTOLIC BLOOD PRESSURE: 163 MMHG | WEIGHT: 174 LBS | TEMPERATURE: 98.7 F | OXYGEN SATURATION: 98 % | DIASTOLIC BLOOD PRESSURE: 79 MMHG | RESPIRATION RATE: 16 BRPM | HEART RATE: 55 BPM

## 2018-04-17 DIAGNOSIS — M54.50 CHRONIC BILATERAL LOW BACK PAIN WITHOUT SCIATICA: ICD-10-CM

## 2018-04-17 DIAGNOSIS — I10 ESSENTIAL HYPERTENSION: Primary | ICD-10-CM

## 2018-04-17 DIAGNOSIS — G89.29 CHRONIC BILATERAL LOW BACK PAIN WITHOUT SCIATICA: ICD-10-CM

## 2018-04-17 NOTE — MR AVS SNAPSHOT
After Visit Summary   4/17/2018    Srikanth Graves    MRN: [id number]           Patient Information     Date Of Birth          1945        Visit Information        Provider Department      4/17/2018 8:40 AM Rohit Pastor MD [facility]        Today's Diagnoses     Essential hypertension    -  1    Chronic bilateral low back pain without sciatica          Care Instructions    1. I want you to come back in 4 weeks so we can check your Blood pressure.  2. Today I have given you the prescription for the TENS unit for your back you can take it to any Medical supply store.    Thank you for coming to [facility].  **If you had lab testing today and your results are reassuring or normal they will be be mailed to you within 7 days.   **If the lab tests need quick action we will call you with the results.  The phone number we will call with results is # [phone] (home) . If this is not the best number please call our clinic and change the number.  If you need any refills please call your pharmacy and they will contact us.  If you have any concerns about today's visit or wish to schedule another appointment please call our office during normal business hours [phone] (8-5:00 M-F)  If you have urgent medical concerns please call [phone] at any time of the day.  If you a medical emergency please call [phone]  Again thank you for choosing [facility] and please let us know how we can best partner with you to improve you and your family's health.              Follow-ups after your visit        Your next 10 appointments already scheduled     Apr 23, 2018  2:30 PM CDT   Return Visit with Rohit Pastor MD   [facility] ([facility])    [address] [place] [address]   [phone]              Who to contact     Please call your clinic at [phone] to:    Ask questions about your health    Make or cancel appointments    Discuss your medicines    Learn about your test  results    Speak to your doctor            Additional Information About Your Visit        [employer] Information     [employer] is an electronic gateway that provides easy, online access to your medical records. With [employer], you can request a clinic appointment, read your test results, renew a prescription or communicate with your care team.     To sign up for [employer] visit the website at [email or web address]   You will be asked to enter the access code listed below, as well as some personal information. Please follow the directions to create your username and password.     Your access code is: [email or web address]  Expires: 2018 12:03 PM     Your access code will  in 90 days. If you need help or a new code, please contact your [facility] or call [phone] for assistance.        Care EveryWhere ID     This is your Care EveryWhere ID. This could be used by other organizations to access your [facility] medical records  [id number]        Your Vitals Were     Pulse Temperature Respirations Pulse Oximetry          55 98.7  F (37.1  C) 16 98%         Blood Pressure from Last 3 Encounters:   18 163/79   18 167/79   18 137/73    Weight from Last 3 Encounters:   18 174 lb (78.9 kg)   18 174 lb 9.6 oz (79.2 kg)   18 173 lb (78.5 kg)              Today, you had the following     No orders found for display         Where to get your medicines      Some of these will need a paper prescription and others can be bought over the counter.  Ask your nurse if you have questions.     Bring a paper prescription for each of these medications     order for DME          Primary Care Provider Office Phone # Fax #    Rohit Pastor [phone] [address] [facility] [address]        Equal Access to Services     STEPHANIE FLOWERS AH: yeyo Hanson, evens vora. So [facility]  [phone].    ATENCIÓN: Si ethan livingston, tiene a andres disposición servicios gratuitos de asistencia lingüística. Edwar kaur [phone].    We comply with applicable federal civil rights laws and [place] laws. We do not discriminate on the basis of race, color, national origin, age, disability, sex, sexual orientation, or gender identity.            Thank you!     Thank you for choosing [facility]  for your care. Our goal is always to provide you with excellent care. Hearing back from our patients is one way we can continue to improve our services. Please take a few minutes to complete the written survey that you may receive in the mail after your visit with us. Thank you!             Your Updated Medication List - Protect others around you: Learn how to safely use, store and throw away your medicines at www.disposemymeds.org.          This list is accurate as of 4/17/18  9:27 AM.  Always use your most recent med list.                   Brand Name Dispense Instructions for use Diagnosis    acetaminophen 500 MG tablet    TYLENOL    180 tablet    Take 2 tablets (1,000 mg) by mouth 3 times daily    Chronic low back pain, unspecified back pain laterality, with sciatica presence unspecified       albuterol 108 (90 Base) MCG/ACT Inhaler    PROAIR HFA/PROVENTIL HFA/VENTOLIN HFA    3 Inhaler    INHALE TWO PUFFS BY MOUTH FOUR TIMES A DAY AS NEEDED    Chronic obstructive pulmonary disease, unspecified COPD type (H)       aspirin 81 MG tablet     90 tablet    Take 1 tablet (81 mg) by mouth daily    Coronary artery disease of native artery of native heart with stable angina pectoris (H)       atorvastatin 20 MG tablet    LIPITOR    90 tablet    Take 1 tablet (20 mg) by mouth daily    Coronary artery disease of native artery of native heart with stable angina pectoris (H)       clopidogrel 75 MG tablet    PLAVIX    90 tablet    Take 1 tablet (75 mg) by mouth daily    Coronary artery disease of native artery of native  heart with stable angina pectoris (H)       docusate sodium 100 MG tablet    COLACE    60 tablet    Take 100 mg by mouth 2 times daily as needed for constipation    Other constipation       dorzolamide-timolol 2-0.5 % ophthalmic solution    COSOPT    1 Bottle    PLACE 1 DROP IN THE RIGHT EYE ONCE DAILY    Primary open angle glaucoma of right eye, mild stage       furosemide 20 MG tablet    LASIX    90 tablet    Take 1 tablet (20 mg) by mouth daily    Essential hypertension       HYDROCERIN Crea     180 g    Apply topically 3 times daily as needed    Dry skin       isosorbide mononitrate 30 MG 24 hr tablet    IMDUR    30 tablet    Take 1 tablet (30 mg) by mouth daily        lidocaine 5 % ointment    XYLOCAINE    30 g    Apply to affected area three times a day as needed    Chronic low back pain, unspecified back pain laterality, with sciatica presence unspecified       lisinopril 40 MG tablet    PRINIVIL/ZESTRIL    90 tablet    Take 1 tablet (40 mg) by mouth daily    Essential hypertension       metoprolol tartrate 100 MG tablet    LOPRESSOR    90 tablet    Take 0.5 tablets (50 mg) by mouth 2 times daily    Essential hypertension       mometasone-formoterol 200-5 MCG/ACT oral inhaler    DULERA    3 Inhaler    INHALE TWO PUFFS BY MOUTH TWICE A DAY    Chronic obstructive pulmonary disease, unspecified COPD type (H)       nitroGLYcerin 0.4 MG/SPRAY spray    NITROLINGUAL     DISSOLVE 1 SPRAY UNDER TONGUE EVERY 5 MINUTES AS NEEDED FOR CHEST PAIN        order for DME     1 Units    TENS unit    Chronic bilateral low back pain without sciatica       polyvinyl alcohol 1.4 % ophthalmic solution    LIQUIFILM TEARS    15 mL    Place 1 drop into the right eye as needed for dry eyes    Dry eyes       potassium chloride SA 10 MEQ CR tablet    K-DUR/KLOR-CON M    180 tablet    Take 2 tablets (20 mEq) by mouth daily    Hypokalemia       ranitidine 150 MG tablet    ZANTAC    90 tablet    Take 1 tablet (150 mg) by mouth daily     Gastroesophageal reflux disease, esophagitis presence not specified       ranolazine 500 MG 12 hr tablet    RANEXA     Take 1 tablet (500 mg) by mouth 2 times daily        sertraline 50 MG tablet    ZOLOFT    90 tablet    Take 1 tablet (50 mg) by mouth daily    Depression, unspecified depression type       sodium chloride 0.65 % nasal spray    OCEAN    3 Bottle    Spray in each nostril 3-4 times daily as needed    Preventive measure       TAB-A-MARZENA Tabs     90 tablet    Take 1 tablet by mouth daily    Routine adult health maintenance       tiotropium 18 MCG capsule    SPIRIVA    90 capsule    Inhale 1 capsule (18 mcg) into the lungs daily    Chronic obstructive pulmonary disease, unspecified COPD type (H)       traZODone 50 MG tablet    DESYREL    180 tablet    Take 1/2 to 2 tablets by mouth at bedtime as needed for sleep.    Insomnia, unspecified type       vitamin D 2000 units tablet     100 tablet    Take 2,000 Units by mouth daily    Preventive measure

## 2018-04-17 NOTE — PROGRESS NOTES
Medication Management Follow Up Note                                                       I am seeing Srikanth Graves for follow up from a previous visit with the pharmacy team, specifically for Blood Pressure monitoring.        Subjective                                                       Summary of Problems Addressed::    Pt states that he gets a headache when taking his lisinopril, because of this he has started taking Excedrin with his lisinopril    Pt verbalizes correct dose of metoprolol tartrate which was changed at last visit    Pt states that he takes lasix once daily as prescribed    Objective                                                       CrCl cannot be calculated (Unknown ideal weight.).    Lab Results   Component Value Date    A1C 5.7 03/07/2018     Last Basic Metabolic Panel:  Lab Results   Component Value Date    .2 03/07/2018      Lab Results   Component Value Date    POTASSIUM 4.0 03/07/2018     Lab Results   Component Value Date    CHLORIDE 105.1 03/07/2018     Lab Results   Component Value Date    ROCKY 9.6 03/07/2018     Lab Results   Component Value Date    CO2 24.9 03/07/2018     Lab Results   Component Value Date    BUN 20.1 03/07/2018     Lab Results   Component Value Date    CR 1.4 03/07/2018     Lab Results   Component Value Date    .0 03/07/2018       BP Readings from Last 3 Encounters:   04/17/18 163/79   04/02/18 167/79   03/19/18 137/73       The ASCVD Risk score (Monique DC Jr, et al., 2013) failed to calculate for the following reasons:    Cannot find a previous HDL lab    Cannot find a previous total cholesterol lab    PHQ-9 score:  No flowsheet data found.            Assessment                                                       Hypertension -  uncontrolled     BP elevated at 2 recent visits     Creatinine elevated on 3/7/18 (1.4 mg/dL)    Dr. Pastor has discussed nonpharmacologic recommendations to decrease BP    Addition of BP cuff could help patient monitor  more frequently and help to make decisions for medication adjustment    Per Dr. Pastor, ACEi indicated at this time considering comorbidities, edema is currently controlled and lasix should be reassessed at another visit, pressure is better controlled after decrease in BB dose from last visit    Recommended first line medications for hypertension for  americans include CCB or thiazide alone or in combination    Due to history of edema, dihydropyridine CCB should be initiated with caution, a non-dihydropyridine such as diltiazem may be an appropriate addition in the future    Addition of chlorthalidone or HCTZ would be appropriate to decrease BP    If combination therapy is warranted, lisinopril should be decreased to 20 mg daily considering adverse effect of headache      Plan/Recommendations                                                       Completed at this visit     Hypertension    BP cuff prescription sent to patient today to receive from medical supply or pharmacy - recommended that pt obtain BP cuff, monitor BP daily, record readings and bring with to next visit    To be completed at a future visit  Hypertension    If BP still elevated in clinic and home pressures show continued elevation, consider adding chlorthalidone or HCTZ AND decreasing lisinopril to 20mg daily    If Hypertension is resistant still, may consider addition of diltiazem      Follow-up                                                       Patient should follow up as needed with Dr. Pastor.      Dr. Pastor was provided the recommendations above  in clinic today and was available for supervision during this visit and is the authorizing prescriber for this visit through the pharmacist collaborative practice agreement.    Gloria Bender, PharmD Resident      Drug therapy problems identified  1. Med: Lisinopril - Safety - adverse effect - Resolution: Change dose; deferred to future visit  2. Med: lisinopril - Efficacy  - tool  needed to measure efficacy - Resolution: Add device or process to assist use; resolved     # of medical conditions addressed: 1  # of medications addressed: 3  # of medication discrepancies identified: 0  # of DTP identified: 2  Time spent: 20 minutes  Level of service: 2

## 2018-04-17 NOTE — MR AVS SNAPSHOT
After Visit Summary   2018    Srikanth Graves    MRN: [id number]           Patient Information     Date Of Birth          1945        Visit Information        Provider Department      2018 9:00 AM Gloria Bender, [facility]        Today's Diagnoses     Essential hypertension    -  1       Follow-ups after your visit        Your next 10 appointments already scheduled     2018  2:30 PM CDT   Return Visit with Rohit Pastor MD   [facility] ([facility])    [address] [place] [address]   [phone]              Who to contact     Please call your clinic at [phone] to:    Ask questions about your health    Make or cancel appointments    Discuss your medicines    Learn about your test results    Speak to your doctor            Additional Information About Your Visit        MyChart Information     FileLifet is an electronic gateway that provides easy, online access to your medical records. With Prepair, you can request a clinic appointment, read your test results, renew a prescription or communicate with your care team.     To sign up for FileLifet visit the website at [email or web address]   You will be asked to enter the access code listed below, as well as some personal information. Please follow the directions to create your username and password.     Your access code is: [email or web address]  Expires: 2018 12:03 PM     Your access code will  in 90 days. If you need help or a new code, please contact your [facility] or call [phone] for assistance.        Care EveryWhere ID     This is your Care EveryWhere ID. This could be used by other organizations to access your [place] medical records  [id number]         Blood Pressure from Last 3 Encounters:   18 163/79   18 167/79   18 137/73    Weight from Last 3 Encounters:   18 174 lb (78.9 kg)   18 174 lb 9.6 oz (79.2 kg)    03/19/18 173 lb (78.5 kg)              We Performed the Following     MTM, EA ADDITIONAL 15 MIN (98209) x 1 (= 15 min.)          Today's Medication Changes          These changes are accurate as of 4/17/18 10:38 PM.  If you have any questions, ask your nurse or doctor.               Start taking these medicines.        Dose/Directions    order for DME   Used for:  Essential hypertension   Started by:  Gloria Bender, [facility]        Equipment being ordered: Home Blood pressure monitor   Quantity:  1 Units   Refills:  0            Where to get your medicines      Some of these will need a paper prescription and others can be bought over the counter.  Ask your nurse if you have questions.     Bring a paper prescription for each of these medications     order for DME    order for DME                Primary Care Provider Office Phone # Fax #    Rohit Pastor [phone]       [address] [place] [address]        Equal Access to Services     STEPHANIE FLOWERS : Hadii lily heck Sonika, waaxda luqadaha, qaybta kaalmada adeivyyakeyur, evens carvajal . So [facility] [phone].    ATENCIÓN: Si habla español, tiene a andres disposición servicios gratuitos de asistencia lingüística. Llame al [phone].    We comply with applicable federal civil rights laws and [place] laws. We do not discriminate on the basis of race, color, national origin, age, disability, sex, sexual orientation, or gender identity.            Thank you!     Thank you for choosing [facility]  for your care. Our goal is always to provide you with excellent care. Hearing back from our patients is one way we can continue to improve our services. Please take a few minutes to complete the written survey that you may receive in the mail after your visit with us. Thank you!             Your Updated Medication List - Protect others around you: Learn how to safely use, store and throw away your medicines at  www.disposemymeds.org.          This list is accurate as of 4/17/18 10:38 PM.  Always use your most recent med list.                   Brand Name Dispense Instructions for use Diagnosis    acetaminophen 500 MG tablet    TYLENOL    180 tablet    Take 2 tablets (1,000 mg) by mouth 3 times daily    Chronic low back pain, unspecified back pain laterality, with sciatica presence unspecified       albuterol 108 (90 Base) MCG/ACT Inhaler    PROAIR HFA/PROVENTIL HFA/VENTOLIN HFA    3 Inhaler    INHALE TWO PUFFS BY MOUTH FOUR TIMES A DAY AS NEEDED    Chronic obstructive pulmonary disease, unspecified COPD type (H)       aspirin 81 MG tablet     90 tablet    Take 1 tablet (81 mg) by mouth daily    Coronary artery disease of native artery of native heart with stable angina pectoris (H)       atorvastatin 20 MG tablet    LIPITOR    90 tablet    Take 1 tablet (20 mg) by mouth daily    Coronary artery disease of native artery of native heart with stable angina pectoris (H)       clopidogrel 75 MG tablet    PLAVIX    90 tablet    Take 1 tablet (75 mg) by mouth daily    Coronary artery disease of native artery of native heart with stable angina pectoris (H)       docusate sodium 100 MG tablet    COLACE    60 tablet    Take 100 mg by mouth 2 times daily as needed for constipation    Other constipation       dorzolamide-timolol 2-0.5 % ophthalmic solution    COSOPT    1 Bottle    PLACE 1 DROP IN THE RIGHT EYE ONCE DAILY    Primary open angle glaucoma of right eye, mild stage       furosemide 20 MG tablet    LASIX    90 tablet    Take 1 tablet (20 mg) by mouth daily    Essential hypertension       HYDROCERIN Crea     180 g    Apply topically 3 times daily as needed    Dry skin       isosorbide mononitrate 30 MG 24 hr tablet    IMDUR    30 tablet    Take 1 tablet (30 mg) by mouth daily        lidocaine 5 % ointment    XYLOCAINE    30 g    Apply to affected area three times a day as needed    Chronic low back pain, unspecified back pain  laterality, with sciatica presence unspecified       lisinopril 40 MG tablet    PRINIVIL/ZESTRIL    90 tablet    Take 1 tablet (40 mg) by mouth daily    Essential hypertension       metoprolol tartrate 100 MG tablet    LOPRESSOR    90 tablet    Take 0.5 tablets (50 mg) by mouth 2 times daily    Essential hypertension       mometasone-formoterol 200-5 MCG/ACT oral inhaler    DULERA    3 Inhaler    INHALE TWO PUFFS BY MOUTH TWICE A DAY    Chronic obstructive pulmonary disease, unspecified COPD type (H)       nitroGLYcerin 0.4 MG/SPRAY spray    NITROLINGUAL     DISSOLVE 1 SPRAY UNDER TONGUE EVERY 5 MINUTES AS NEEDED FOR CHEST PAIN        order for DME     1 Units    TENS unit    Chronic bilateral low back pain without sciatica       order for DME     1 Units    Equipment being ordered: Home Blood pressure monitor    Essential hypertension       polyvinyl alcohol 1.4 % ophthalmic solution    LIQUIFILM TEARS    15 mL    Place 1 drop into the right eye as needed for dry eyes    Dry eyes       potassium chloride SA 10 MEQ CR tablet    K-DUR/KLOR-CON M    180 tablet    Take 2 tablets (20 mEq) by mouth daily    Hypokalemia       ranitidine 150 MG tablet    ZANTAC    90 tablet    Take 1 tablet (150 mg) by mouth daily    Gastroesophageal reflux disease, esophagitis presence not specified       ranolazine 500 MG 12 hr tablet    RANEXA     Take 1 tablet (500 mg) by mouth 2 times daily        sertraline 50 MG tablet    ZOLOFT    90 tablet    Take 1 tablet (50 mg) by mouth daily    Depression, unspecified depression type       sodium chloride 0.65 % nasal spray    OCEAN    3 Bottle    Spray in each nostril 3-4 times daily as needed    Preventive measure       TAB-A-MARZENA Tabs     90 tablet    Take 1 tablet by mouth daily    Routine adult health maintenance       tiotropium 18 MCG capsule    SPIRIVA    90 capsule    Inhale 1 capsule (18 mcg) into the lungs daily    Chronic obstructive pulmonary disease, unspecified COPD type (H)        traZODone 50 MG tablet    DESYREL    180 tablet    Take 1/2 to 2 tablets by mouth at bedtime as needed for sleep.    Insomnia, unspecified type       vitamin D 2000 units tablet     100 tablet    Take 2,000 Units by mouth daily    Preventive measure

## 2018-04-17 NOTE — PATIENT INSTRUCTIONS
1. I want you to come back in 4 weeks so we can check your Blood pressure.  2. Today I have given you the prescription for the TENS unit for your back you can take it to any Medical supply store.    Thank you for coming to [facility].  **If you had lab testing today and your results are reassuring or normal they will be be mailed to you within 7 days.   **If the lab tests need quick action we will call you with the results.  The phone number we will call with results is # [phone] (home) . If this is not the best number please call our clinic and change the number.  If you need any refills please call your pharmacy and they will contact us.  If you have any concerns about today's visit or wish to schedule another appointment please call our office during normal business hours [phone] (8-5:00 M-F)  If you have urgent medical concerns please call [phone] at any time of the day.  If you a medical emergency please call [phone]  Again thank you for choosing [facility] and please let us know how we can best partner with you to improve you and your family's health.

## 2018-04-18 NOTE — PROGRESS NOTES
Preceptor attestation:  Patient discussed with the resident.   Assessment and plan reviewed with resident and agreed upon.  Supervising pharmacist: Daja Luna  [facility]    Daja Luna, Pharm.D.

## 2018-04-18 NOTE — PROGRESS NOTES
S: Srikanth Graves is a 72 year old male who returns for follow up of  HTN take meds as recommended on  Lisinopril 40 mg and lopressor 50 mg BID   2 Back pain/chronic, ambulatory on tylenol for pain needs TENs unit  Patients states that main concern today is HTN follow up    PMHX/PSHX/MEDS/ALLERGIES/SHX/FHX reviewed and updated in Epic.      ROS:  General: No fevers, chills  Head: No headache  Ears: No acute change in hearing.    CV: No chest pain or palpitations.  Resp: No shortness of breath.  No cough. No hemoptysis.  GI: No nausea, vomiting, constipation, diarrhea  : No urinary pains    O: /79  Pulse 55  Temp 98.7  F (37.1  C)  Resp 16  Wt 174 lb (78.9 kg)  SpO2 98%   Gen:  Well nourished and in NAD  Ambulating well, no aid for walking  In and out of the clinic  Neck: supple without lymphadenopathy  CV:  RRR  - no murmurs, rubs, or gallups,   Pulm:  CTAB, no wheezes/rales/rhonchi, good air entry   ABD: soft, nontender, no masses, no rebound, BS intact throughout  Extrem: no cyanosis, edema or clubbing  Psych: Euthymic      (I10) Essential hypertension  (primary encounter diagnosis)  Comment:  Compliant with meds, Repeat /82  Plan: continue lisinopril and lopressor, fu 3 to 4 weeks Will need another Blood  Pressure med since I can not go up in lopressor due to  Prior bradycardia  None pharmacological treatments are also discussed   Blood pressure kit given/has PCA  (M54.5,  G89.29) Chronic bilateral low back pain without sciatica  Comment: Tylenol. Moderate to severe DJD  Plan: order for DME/TENs unit prescription given to patient           RTC in 3 to 4  weeks, for follow up of Blood  or sooner if develops new or worsening symptoms.    Rohit Pastor

## 2018-05-10 ENCOUNTER — OFFICE VISIT (OUTPATIENT)
Dept: FAMILY MEDICINE | Facility: CLINIC | Age: 73
End: 2018-05-10
Payer: COMMERCIAL

## 2018-05-10 VITALS
OXYGEN SATURATION: 98 % | WEIGHT: 176.8 LBS | TEMPERATURE: 98.4 F | SYSTOLIC BLOOD PRESSURE: 127 MMHG | DIASTOLIC BLOOD PRESSURE: 74 MMHG | HEART RATE: 56 BPM
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
## 2018-05-10 DIAGNOSIS — M54.2 NECK PAIN: ICD-10-CM
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
## 2018-05-10 NOTE — LETTER
May 14, 2018      Srikanth Graves  [address] [place] [address]        Dear Srikanth,  Your neck x rays is ok, had metal palate and fusion as before    I will go over with you about this results when I  see in clinic  In 2 weeks as planned during your RECENT  clinic visit    If you have any questions, please call the clinic to make an appointment.    Sincerely,    Rohit Pastor MD

## 2018-05-11 ASSESSMENT — PATIENT HEALTH QUESTIONNAIRE - PHQ9: SUM OF ALL RESPONSES TO PHQ QUESTIONS 1-9: 5

## 2018-05-24 ENCOUNTER — OFFICE VISIT (OUTPATIENT)
Dept: FAMILY MEDICINE | Facility: CLINIC | Age: 73
End: 2018-05-24
Payer: COMMERCIAL

## 2018-05-24 ENCOUNTER — AMBULATORY - HEALTHEAST (OUTPATIENT)
Dept: ADMINISTRATIVE | Facility: REHABILITATION | Age: 73
End: 2018-05-24

## 2018-05-24 VITALS
SYSTOLIC BLOOD PRESSURE: 129 MMHG | HEART RATE: 55 BPM | OXYGEN SATURATION: 97 % | BODY MASS INDEX: 31.68 KG/M2 | WEIGHT: 178.8 LBS | HEIGHT: 63 IN | DIASTOLIC BLOOD PRESSURE: 70 MMHG | TEMPERATURE: 98.4 F | RESPIRATION RATE: 16 BRPM

## 2018-05-24 DIAGNOSIS — M54.2 NECK PAIN: ICD-10-CM

## 2018-05-24 DIAGNOSIS — M54.2 NECK PAIN: Primary | ICD-10-CM
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
## 2018-06-05 ENCOUNTER — OFFICE VISIT - HEALTHEAST (OUTPATIENT)
Dept: PHYSICAL THERAPY | Facility: REHABILITATION | Age: 73
End: 2018-06-05

## 2018-06-05 ENCOUNTER — MEDICAL CORRESPONDENCE (OUTPATIENT)
Dept: HEALTH INFORMATION MANAGEMENT | Facility: CLINIC | Age: 73
End: 2018-06-05

## 2018-06-05 DIAGNOSIS — R51.9 HEADACHE: ICD-10-CM

## 2018-06-05 DIAGNOSIS — M54.2 CERVICALGIA: ICD-10-CM

## 2018-06-05 DIAGNOSIS — R29.3 POOR POSTURE: ICD-10-CM

## 2018-06-05 DIAGNOSIS — G89.29 CHRONIC INTRACTABLE HEADACHE, UNSPECIFIED HEADACHE TYPE: ICD-10-CM

## 2018-06-05 DIAGNOSIS — M53.82 WEAKNESS OF NECK: ICD-10-CM

## 2018-06-05 DIAGNOSIS — R51.9 CHRONIC INTRACTABLE HEADACHE, UNSPECIFIED HEADACHE TYPE: ICD-10-CM

## 2018-06-20 ENCOUNTER — OFFICE VISIT (OUTPATIENT)
Dept: FAMILY MEDICINE | Facility: CLINIC | Age: 73
End: 2018-06-20
Payer: COMMERCIAL

## 2018-06-20 VITALS
TEMPERATURE: 98.8 F | OXYGEN SATURATION: 98 % | RESPIRATION RATE: 16 BRPM | HEART RATE: 52 BPM | SYSTOLIC BLOOD PRESSURE: 141 MMHG | DIASTOLIC BLOOD PRESSURE: 69 MMHG

## 2018-06-20 DIAGNOSIS — R10.9 FLANK PAIN: ICD-10-CM

## 2018-06-20 DIAGNOSIS — R05.3 CHRONIC COUGH: ICD-10-CM

## 2018-06-20 DIAGNOSIS — R09.82 POST-NASAL DRIP: Primary | ICD-10-CM

## 2018-06-20 DIAGNOSIS — R25.2 LEG CRAMPING: ICD-10-CM

## 2018-06-20 LAB
BACTERIA: NORMAL
BILIRUBIN UR: NEGATIVE
BLOOD UR: ABNORMAL
BUN SERPL-MCNC: 15.3 MG/DL (ref 7–21)
CALCIUM SERPL-MCNC: 9.8 MG/DL (ref 8.5–10.1)
CASTS: NORMAL /LPF
CHLORIDE SERPLBLD-SCNC: 102.7 MMOL/L (ref 98–110)
CO2 SERPL-SCNC: 30 MMOL/L (ref 20–32)
CREAT SERPL-MCNC: 1.2 MG/DL (ref 0.7–1.3)
CRYSTAL URINE: NORMAL /LPF
EPITHELIAL CELLS UR: NORMAL /LPF (ref 0–2)
GFR SERPL CREATININE-BSD FRML MDRD: 63.4 ML/MIN/1.7 M2
GLUCOSE SERPL-MCNC: 94.1 MG'DL (ref 70–99)
GLUCOSE URINE: NEGATIVE
HCT VFR BLD AUTO: 45.2 % (ref 40–53)
HEMOGLOBIN: 14.7 G/DL (ref 13.3–17.7)
KETONES UR QL: NEGATIVE
LEUKOCYTE ESTERASE UR: ABNORMAL
MAGNESIUM SERPL-MCNC: 1.8 MG/DL (ref 1.8–2.6)
MCH RBC QN AUTO: 30.8 PG (ref 26.5–35)
MCHC RBC AUTO-ENTMCNC: 32.5 G/DL (ref 32–36)
MCV RBC AUTO: 94.7 FL (ref 78–100)
MUCOUS URINE: NORMAL LPF
NITRITE UR QL STRIP: NEGATIVE
PH UR STRIP: 5 [PH] (ref 5–7)
PLATELET # BLD AUTO: 201 K/UL (ref 150–450)
POTASSIUM SERPL-SCNC: 4 MMOL/DL (ref 3.2–4.6)
PROTEIN UR: NEGATIVE
RBC # BLD AUTO: 4.8 M/UL (ref 4.4–5.9)
RBC URINE: NORMAL /HPF
SODIUM SERPL-SCNC: 142 MMOL/L (ref 132–142)
SP GR UR STRIP: 1.01
UROBILINOGEN UR STRIP-ACNC: ABNORMAL
WBC # BLD AUTO: 5.3 K/UL (ref 4–11)
WBC URINE: NORMAL /HPF

## 2018-06-20 RX ORDER — ASPIRIN 81 MG
TABLET,CHEWABLE ORAL 3 TIMES DAILY PRN
Qty: 42.5 G | Refills: 1 | Status: SHIPPED | OUTPATIENT
Start: 2018-06-20 | End: 2018-08-31

## 2018-06-20 RX ORDER — FLUTICASONE PROPIONATE 50 MCG
2 SPRAY, SUSPENSION (ML) NASAL DAILY
Qty: 1 BOTTLE | Refills: 0 | Status: SHIPPED | OUTPATIENT
Start: 2018-06-20 | End: 2019-01-30

## 2018-06-20 NOTE — PROGRESS NOTES
"       SUBJECTIVE       Srikanth Graves is a 73 year old  male with a PMH significant for:     Patient Active Problem List   Diagnosis     Other constipation     Advance care planning     Bunion of great toe     Degeneration of cervical intervertebral disc     Chronic airway obstruction (H)     Chronic low back pain     Chronic pain disorder     Chronic obstructive pulmonary disease (H)     Coronary artery disease of native artery of native heart with stable angina pectoris (H)     Depression     DJD (degenerative joint disease), ankle and foot     Dyspnea on exertion     Edema     Esophageal reflux     Essential hypertension     Headache disorder     Peripheral vascular disease (H)     Primary open angle glaucoma of right eye, mild stage     Vitamin D deficiency     Cocaine abuse in remission     Coccydynia     Clavus     Eye globe prosthesis     Hallux valgus, acquired     Neck pain     Routine adult health maintenance     Controlled substance agreement terminated     He presents with multiple concerns.    Right flank pain.  Recently seen here in clinic for \"bruising and blood clot\" on the right side. Feels a \"pounding\" sensation on the right side.  Has been going on for about 1 year.  Sometimes has it on the left.  Had been seen at [facility] for this and had kidney testing, but was told that it was normal.  Pain has gradually gotten worse.  Not on any medication.  No nausea, vomiting, dysuria, hematuria, abdominal pain, diarrhea, blood in stool.  No fevers.    Cough symptoms.  Had been seen at [facility] within the last year. Tried a cough medicine, but never took it because insurance wouldn't pay for it.  Cough is mostly at night. Has a lot of phlegm in his throat.  Also has some allergy symptoms.  Has a pulmonologist at [facility] and had testing.  States that he uses inhalers, but that the inhalers don't affect his cough.     Leg pain.  Has leg cramping in the back of the legs.  Has been going on for 1 week. Started in " right leg, but is now affecting both legs.  Left foot was swollen for a day, which has since resolved.  Can happen while walking or laying in bed.  On diuretic.  History of CAD.     Former smoker quit 2 years ago.    PMH, Medications and Allergies were reviewed and updated as needed.    ROS as above        OBJECTIVE     Vitals:    06/20/18 1304   BP: 141/69   BP Location: Left arm   Patient Position: Sitting   Cuff Size: Adult Large   Pulse: 52   Resp: 16   Temp: 98.8  F (37.1  C)   TempSrc: Oral   SpO2: 98%     There is no height or weight on file to calculate BMI.    General: Alert, appropriate, and cooperative.  Appears stated age.  In no acute distress.  HEENT:  Atraumatic.  Pupils equal, round, and reactive bilaterally.  Extraocular movements intact. Bilateral TMs gray and translucent.  Nasal turbinates are pale and boggy.  Mucous membranes are moist, clear drainage in oropharynx.  Neck:  Supple.  No adenopathy.    CV:  Regular rhythm and rate.  No murmurs, rubs, or gallops.  Lungs:  Clear to auscultation bilaterally.  No wheezes, rhonchi, or rales.  Abd:  Soft, non-distended, non-tender.  Bowel sounds present.  No masses or organomegaly to palpation.  Back: Mild bruising along right lower rib cage with tenderness to palpation.  No CVA tenderness bilaterally.  Ext:  No lower extremity edema.  There is no tenderness with palpation of the calf muscles.  No foot sores or ulcers.    ASSESSMENT AND PLAN     (R09.82) Post-nasal drip  (primary encounter diagnosis)  (R05) Chronic cough  Comment: Patient with 1 year history of chronic non-productive cough, worse at night.  Has seen pulmonology and uses inhalers.  PFTs and CXR within the last year look okay.  Exam reveals boggy nasal turbinates--which makes me suspicious of post nasal drip and allergies as the cause of cough.  Other ddx includes reflux, medication side effect (patient on lisinopril given history of CABG).  Plan: Will try flonase 2 sprays each nostril for  1 month.  If symptoms not improved, consider trial off lisinopril (hesitant to do this, given his heart history).  Could also consider acid reducing medication vs ENT referral    (R10.9) Flank pain  Comment: Patient with 1 month of flank pain after trauma which caused bruising along right rib cage.  Exam shows tenderness over right lower rib cage and there is some residual symptoms.  He had CT fo abdomen and pelvis within the last 6 months that was normal.  Plan: Will check UA to ensure no renal pathology.  BMP as below.  Recommend topical capsaicin cream as needed for pain.    (R25.2) Leg cramping  Comment: Patient complaining for 1-2 weeks of gastrocnemius cramping.  Happens at rest and with activity.  Given the rapid onset and bilateral nature and happening at rest, seems unlikely to be claudication.  However, with his history of CAD requiring CABG, it is certainly on the differential.  On multiple diuretics, so electrolyte abnormality is a strong possibility.  Also consider anemia. Do not suspect DVT as no swelling or PE symptoms.  Plan: Will check BMP, magnesium, and CBC.  Will also order DEMETRA.      RTC in 1 month for follow up of concerns or sooner if develops new or worsening symptoms.    Staffed with Dr. Moore.    Kamilla Beard MD PGY-3  [facility]  6/20/2018

## 2018-06-20 NOTE — MR AVS SNAPSHOT
After Visit Summary   6/20/2018    Srikanth Graves    MRN: [id number]           Patient Information     Date Of Birth          1945        Visit Information        Provider Department      6/20/2018 1:00 PM Kamilla Beard MD [facility]        Today's Diagnoses     Flank pain    -  1    Leg cramping        Post-nasal drip          Care Instructions    For cough:  1. Take flonase 2 sprays each nostril daily    For leg cramps';  1. Labs today  2. Will check US of the legs to make sure there is not a blood flow problem    For side pain:  1. Check urine tests today  2. Apply capsaicin cream to be used as needed    Follow up with Dr. Pastor in 1 month          Follow-ups after your visit        Future tests that were ordered for you today     Open Future Orders        Priority Expected Expires Ordered    US Ankle-arm index Routine  6/20/2019 6/20/2018            Who to contact     Please call your clinic at [phone] to:    Ask questions about your health    Make or cancel appointments    Discuss your medicines    Learn about your test results    Speak to your doctor            Additional Information About Your Visit        Care EveryWhere ID     This is your Care EveryWhere ID. This could be used by other organizations to access your [facility] medical records  [id number]        Your Vitals Were     Pulse Temperature Respirations Pulse Oximetry          52 98.8  F (37.1  C) (Oral) 16 98%         Blood Pressure from Last 3 Encounters:   06/20/18 141/69   05/24/18 129/70   05/10/18 127/74    Weight from Last 3 Encounters:   05/24/18 178 lb 12.8 oz (81.1 kg)   05/10/18 176 lb 12.8 oz (80.2 kg)   04/17/18 174 lb (78.9 kg)              We Performed the Following     Basic Metabolic Panel ([facility])     CBC with Plt ([facility])     Magnesium ([facility])          Today's Medication Changes          These changes are accurate as of 6/20/18  1:44 PM.  If you have any questions, ask your nurse or  doctor.               Start taking these medicines.        Dose/Directions    Capsaicin 0.1 % cream   Commonly known as:  CAPSAICIN HP   Used for:  Flank pain   Started by:  Kamilla Beard MD        Apply topically 3 times daily as needed (pain)   Quantity:  42.5 g   Refills:  1       fluticasone 50 MCG/ACT spray   Commonly known as:  FLONASE   Used for:  Post-nasal drip   Started by:  Kamilla Beard MD        Dose:  2 spray   Spray 2 sprays into both nostrils daily   Quantity:  1 Bottle   Refills:  0            Where to get your medicines      These medications were sent to [facility] [place], [place] - [address], [place] [address]     Phone:  [phone]     Capsaicin 0.1 % cream    fluticasone 50 MCG/ACT spray                Primary Care Provider Office Phone # Fax #    Rohit Pastor [phone]       [address] [place] [address]        Equal Access to Services     [facility] AH: Hadii aad ku hadasho Soomaali, waaxda luqadaha, qaybta kaalmada adeegyada, waxay idiin hayabdieln blanca carvajal . So [facility] [phone].    ATENCIÓN: Si habla español, tiene a andres disposición servicios gratuitos de asistencia lingüística. [facility] [phone].    We comply with applicable federal civil rights laws and [place] laws. We do not discriminate on the basis of race, color, national origin, age, disability, sex, sexual orientation, or gender identity.            Thank you!     Thank you for choosing [facility]  for your care. Our goal is always to provide you with excellent care. Hearing back from our patients is one way we can continue to improve our services. Please take a few minutes to complete the written survey that you may receive in the mail after your visit with us. Thank you!             Your Updated Medication List - Protect others around you: Learn how to safely use, store and throw away your medicines at  www.disposemymeds.org.          This list is accurate as of 6/20/18  1:44 PM.  Always use your most recent med list.                   Brand Name Dispense Instructions for use Diagnosis    acetaminophen 500 MG tablet    TYLENOL    180 tablet    Take 2 tablets (1,000 mg) by mouth 3 times daily    Chronic low back pain, unspecified back pain laterality, with sciatica presence unspecified       albuterol 108 (90 Base) MCG/ACT Inhaler    PROAIR HFA/PROVENTIL HFA/VENTOLIN HFA    3 Inhaler    INHALE TWO PUFFS BY MOUTH FOUR TIMES A DAY AS NEEDED    Chronic obstructive pulmonary disease, unspecified COPD type (H)       aspirin 81 MG tablet     90 tablet    Take 1 tablet (81 mg) by mouth daily    Coronary artery disease of native artery of native heart with stable angina pectoris (H)       atorvastatin 20 MG tablet    LIPITOR    90 tablet    Take 1 tablet (20 mg) by mouth daily    Coronary artery disease of native artery of native heart with stable angina pectoris (H)       Capsaicin 0.1 % cream    CAPSAICIN HP    42.5 g    Apply topically 3 times daily as needed (pain)    Flank pain       clopidogrel 75 MG tablet    PLAVIX    90 tablet    Take 1 tablet (75 mg) by mouth daily    Coronary artery disease of native artery of native heart with stable angina pectoris (H)       docusate sodium 100 MG tablet    COLACE    60 tablet    Take 100 mg by mouth 2 times daily as needed for constipation    Other constipation       dorzolamide-timolol 2-0.5 % ophthalmic solution    COSOPT    1 Bottle    PLACE 1 DROP IN THE RIGHT EYE ONCE DAILY    Primary open angle glaucoma of right eye, mild stage       fluticasone 50 MCG/ACT spray    FLONASE    1 Bottle    Spray 2 sprays into both nostrils daily    Post-nasal drip       furosemide 20 MG tablet    LASIX    90 tablet    Take 1 tablet (20 mg) by mouth daily    Essential hypertension       HYDROCERIN Crea     180 g    Apply topically 3 times daily as needed    Dry skin       isosorbide  mononitrate 30 MG 24 hr tablet    IMDUR    30 tablet    Take 1 tablet (30 mg) by mouth daily        lidocaine 5 % ointment    XYLOCAINE    30 g    Apply to affected area three times a day as needed    Chronic low back pain, unspecified back pain laterality, with sciatica presence unspecified       lisinopril 40 MG tablet    PRINIVIL/ZESTRIL    90 tablet    Take 1 tablet (40 mg) by mouth daily    Essential hypertension       metoprolol tartrate 100 MG tablet    LOPRESSOR    90 tablet    Take 0.5 tablets (50 mg) by mouth 2 times daily    Essential hypertension       mometasone-formoterol 200-5 MCG/ACT oral inhaler    DULERA    3 Inhaler    INHALE TWO PUFFS BY MOUTH TWICE A DAY    Chronic obstructive pulmonary disease, unspecified COPD type (H)       nitroGLYcerin 0.4 MG/SPRAY spray    NITROLINGUAL     DISSOLVE 1 SPRAY UNDER TONGUE EVERY 5 MINUTES AS NEEDED FOR CHEST PAIN        order for DME     1 Units    TENS unit    Chronic bilateral low back pain without sciatica       order for DME     1 Units    Equipment being ordered: Home Blood pressure monitor    Essential hypertension       polyvinyl alcohol 1.4 % ophthalmic solution    LIQUIFILM TEARS    15 mL    Place 1 drop into the right eye as needed for dry eyes    Dry eyes       potassium chloride SA 10 MEQ CR tablet    K-DUR/KLOR-CON M    180 tablet    Take 2 tablets (20 mEq) by mouth daily    Hypokalemia       ranitidine 150 MG tablet    ZANTAC    90 tablet    Take 1 tablet (150 mg) by mouth daily    Gastroesophageal reflux disease, esophagitis presence not specified       ranolazine 500 MG 12 hr tablet    RANEXA     Take 1 tablet (500 mg) by mouth 2 times daily        sertraline 50 MG tablet    ZOLOFT    90 tablet    Take 1 tablet (50 mg) by mouth daily    Depression, unspecified depression type       sodium chloride 0.65 % nasal spray    OCEAN    3 Bottle    Spray in each nostril 3-4 times daily as needed    Preventive measure       TAB-A-MARZENA Tabs     90 tablet     Take 1 tablet by mouth daily    Routine adult health maintenance       tiotropium 18 MCG capsule    SPIRIVA    90 capsule    Inhale 1 capsule (18 mcg) into the lungs daily    Chronic obstructive pulmonary disease, unspecified COPD type (H)       traZODone 50 MG tablet    DESYREL    180 tablet    Take 1/2 to 2 tablets by mouth at bedtime as needed for sleep.    Insomnia, unspecified type       vitamin D 2000 units tablet     100 tablet    Take 2,000 Units by mouth daily    Preventive measure

## 2018-06-20 NOTE — PATIENT INSTRUCTIONS
For cough:  1. Take flonase 2 sprays each nostril daily    For leg cramps';  1. Labs today  2. Will check US of the legs to make sure there is not a blood flow problem    For side pain:  1. Check urine tests today  2. Apply capsaicin cream to be used as needed    Follow up with Dr. Pastor in 1 month    June 21, 2018 @ 4:24 pm  Unable to schedule U/S as the insurance kicked back an ABN with diagnosis. Discussed with Dr. Moore - will hold order and follow up with Dr. Beard

## 2018-06-20 NOTE — PROGRESS NOTES
Preceptor Attestation:   Patient seen, evaluated and discussed with the resident. I have verified the content of the note, which accurately reflects my assessment of the patient and the plan of care.   Supervising Physician:  Yevgeniy Moore MD

## 2018-06-21 ENCOUNTER — RECORDS - HEALTHEAST (OUTPATIENT)
Dept: ADMINISTRATIVE | Facility: OTHER | Age: 73
End: 2018-06-21

## 2018-06-25 ENCOUNTER — TELEPHONE (OUTPATIENT)
Dept: FAMILY MEDICINE | Facility: CLINIC | Age: 73
End: 2018-06-25

## 2018-06-25 DIAGNOSIS — A59.9 TRICHOMONAS INFECTION: Primary | ICD-10-CM

## 2018-06-25 RX ORDER — METRONIDAZOLE 500 MG/1
2000 TABLET ORAL ONCE
Qty: 4 TABLET | Refills: 0 | Status: SHIPPED | OUTPATIENT
Start: 2018-06-25 | End: 2019-01-30

## 2018-06-25 NOTE — TELEPHONE ENCOUNTER
Patient seen on 6/20 for a number of symptoms, including flank pain.  See note for more details about complaints.  Patient had been having flank pain, so UA was collected.  UA consistent with mild infection and micro showed that the patient had trichomonas.    Called patient to inform him of trichomonal infection. He was surprised to hear this.  Advised him that this is an STD, so both he and his partners should be treated.  He verbalized understanding.  Rx for flagyl 2000 mg once sent to pharmacy.    Would advise full STD testing at next visit, since this was incidentally found.  Appointment scheduled with Dr. Pastor next month.    Patient also has DEMETRA scheduled for next week. Will await results.    Will route to Dr. Pastor as an FYI.    Kamilla Beard MD PGY-3  [facility]  6/25/2018

## 2018-06-28 ENCOUNTER — RECORDS - HEALTHEAST (OUTPATIENT)
Dept: VASCULAR ULTRASOUND | Facility: CLINIC | Age: 73
End: 2018-06-28

## 2018-06-28 ENCOUNTER — TELEPHONE (OUTPATIENT)
Dept: FAMILY MEDICINE | Facility: CLINIC | Age: 73
End: 2018-06-28

## 2018-06-28 DIAGNOSIS — M79.89 OTHER SPECIFIED SOFT TISSUE DISORDERS: ICD-10-CM

## 2018-06-28 DIAGNOSIS — M79.604 PAIN IN RIGHT LEG: ICD-10-CM

## 2018-06-28 DIAGNOSIS — T14.8XXA OTHER INJURY OF UNSPECIFIED BODY REGION, INITIAL ENCOUNTER: ICD-10-CM

## 2018-06-28 NOTE — TELEPHONE ENCOUNTER
Pharmacy sent Rx for hydrocerin cream but it is not listed on patients medication list please advice thank you

## 2018-06-29 DIAGNOSIS — R25.2 LEG CRAMPING: ICD-10-CM

## 2018-07-03 DIAGNOSIS — L85.3 DRY SKIN: ICD-10-CM

## 2018-07-03 RX ORDER — LANOLIN ALCOHOL/MO/W.PET/CERES
CREAM (GRAM) TOPICAL
Qty: 180 G | Refills: 3 | Status: SHIPPED | OUTPATIENT
Start: 2018-07-03 | End: 2019-01-30

## 2018-07-05 ENCOUNTER — TRANSFERRED RECORDS (OUTPATIENT)
Dept: HEALTH INFORMATION MANAGEMENT | Facility: CLINIC | Age: 73
End: 2018-07-05

## 2018-07-17 ENCOUNTER — OFFICE VISIT (OUTPATIENT)
Dept: FAMILY MEDICINE | Facility: CLINIC | Age: 73
End: 2018-07-17
Payer: COMMERCIAL

## 2018-07-17 VITALS
HEART RATE: 57 BPM | DIASTOLIC BLOOD PRESSURE: 75 MMHG | OXYGEN SATURATION: 97 % | RESPIRATION RATE: 16 BRPM | SYSTOLIC BLOOD PRESSURE: 167 MMHG | WEIGHT: 182 LBS | BODY MASS INDEX: 31.85 KG/M2 | TEMPERATURE: 98.3 F

## 2018-07-17 DIAGNOSIS — R30.0 DYSURIA: Primary | ICD-10-CM

## 2018-07-17 NOTE — MR AVS SNAPSHOT
After Visit Summary   7/17/2018    Srikanth Graves    MRN: [id number]           Patient Information     Date Of Birth          1945        Visit Information        Provider Department      7/17/2018 8:40 AM Rohit Pastor MD [facility]        Today's Diagnoses     Dysuria    -  1      Care Instructions    Urine tested today for sexual transited disease Records from, uirology          Follow-ups after your visit        Who to contact     Please call your clinic at [phone] to:    Ask questions about your health    Make or cancel appointments    Discuss your medicines    Learn about your test results    Speak to your doctor            Additional Information About Your Visit        Care EveryWhere ID     This is your Care EveryWhere ID. This could be used by other organizations to access your [place] medical records  [id number]        Your Vitals Were     Pulse Temperature Respirations Pulse Oximetry BMI (Body Mass Index)       57 98.3  F (36.8  C) (Oral) 16 97% 31.85 kg/m2        Blood Pressure from Last 3 Encounters:   07/17/18 164/80   06/20/18 141/69   05/24/18 129/70    Weight from Last 3 Encounters:   07/17/18 182 lb (82.6 kg)   05/24/18 178 lb 12.8 oz (81.1 kg)   05/10/18 176 lb 12.8 oz (80.2 kg)              We Performed the Following     Chlamydia/Gono Amplified ([facility])        Primary Care Provider Office Phone # Fax #    Rohit Pastor [phone]       [address] [place] [address]        Equal Access to Services     [facility]: Hadii aad ku hadasho Sonika, waaxda luqadaha, qaybta kaalmada adeishda, waxay kale carvajal . So [facility] [phone].    ATENCIÓN: Si habla español, tiene a andres disposición servicios gratuitos de asistencia lingüística. Llame al [phone].    We comply with applicable federal civil rights laws and [place] laws. We do not discriminate on the basis of race, color, national origin, age, disability, sex,  sexual orientation, or gender identity.            Thank you!     Thank you for choosing [facility]  for your care. Our goal is always to provide you with excellent care. Hearing back from our patients is one way we can continue to improve our services. Please take a few minutes to complete the written survey that you may receive in the mail after your visit with us. Thank you!             Your Updated Medication List - Protect others around you: Learn how to safely use, store and throw away your medicines at www.disposemymeds.org.          This list is accurate as of 7/17/18  8:57 AM.  Always use your most recent med list.                   Brand Name Dispense Instructions for use Diagnosis    acetaminophen 500 MG tablet    TYLENOL    180 tablet    Take 2 tablets (1,000 mg) by mouth 3 times daily    Chronic low back pain, unspecified back pain laterality, with sciatica presence unspecified       albuterol 108 (90 Base) MCG/ACT Inhaler    PROAIR HFA/PROVENTIL HFA/VENTOLIN HFA    3 Inhaler    INHALE TWO PUFFS BY MOUTH FOUR TIMES A DAY AS NEEDED    Chronic obstructive pulmonary disease, unspecified COPD type (H)       aspirin 81 MG tablet     90 tablet    Take 1 tablet (81 mg) by mouth daily    Coronary artery disease of native artery of native heart with stable angina pectoris (H)       atorvastatin 20 MG tablet    LIPITOR    90 tablet    Take 1 tablet (20 mg) by mouth daily    Coronary artery disease of native artery of native heart with stable angina pectoris (H)       Capsaicin 0.1 % cream    CAPSAICIN HP    42.5 g    Apply topically 3 times daily as needed (pain)    Flank pain       clopidogrel 75 MG tablet    PLAVIX    90 tablet    Take 1 tablet (75 mg) by mouth daily    Coronary artery disease of native artery of native heart with stable angina pectoris (H)       docusate sodium 100 MG tablet    COLACE    60 tablet    Take 100 mg by mouth 2 times daily as needed for constipation    Other constipation        dorzolamide-timolol 2-0.5 % ophthalmic solution    COSOPT    1 Bottle    PLACE 1 DROP IN THE RIGHT EYE ONCE DAILY    Primary open angle glaucoma of right eye, mild stage       fluticasone 50 MCG/ACT spray    FLONASE    1 Bottle    Spray 2 sprays into both nostrils daily    Post-nasal drip       furosemide 20 MG tablet    LASIX    90 tablet    Take 1 tablet (20 mg) by mouth daily    Essential hypertension       HYDROCERIN Crea     180 g    Apply topically 3 times daily as needed    Dry skin       isosorbide mononitrate 30 MG 24 hr tablet    IMDUR    30 tablet    Take 1 tablet (30 mg) by mouth daily        lidocaine 5 % ointment    XYLOCAINE    30 g    Apply to affected area three times a day as needed    Chronic low back pain, unspecified back pain laterality, with sciatica presence unspecified       lisinopril 40 MG tablet    PRINIVIL/ZESTRIL    90 tablet    Take 1 tablet (40 mg) by mouth daily    Essential hypertension       metoprolol tartrate 100 MG tablet    LOPRESSOR    90 tablet    Take 0.5 tablets (50 mg) by mouth 2 times daily    Essential hypertension       mometasone-formoterol 200-5 MCG/ACT oral inhaler    DULERA    3 Inhaler    INHALE TWO PUFFS BY MOUTH TWICE A DAY    Chronic obstructive pulmonary disease, unspecified COPD type (H)       nitroGLYcerin 0.4 MG/SPRAY spray    NITROLINGUAL     DISSOLVE 1 SPRAY UNDER TONGUE EVERY 5 MINUTES AS NEEDED FOR CHEST PAIN        order for DME     1 Units    TENS unit    Chronic bilateral low back pain without sciatica       order for DME     1 Units    Equipment being ordered: Home Blood pressure monitor    Essential hypertension       polyvinyl alcohol 1.4 % ophthalmic solution    LIQUIFILM TEARS    15 mL    Place 1 drop into the right eye as needed for dry eyes    Dry eyes       potassium chloride SA 10 MEQ CR tablet    K-DUR/KLOR-CON M    180 tablet    Take 2 tablets (20 mEq) by mouth daily    Hypokalemia       ranitidine 150 MG tablet    ZANTAC    90 tablet     Take 1 tablet (150 mg) by mouth daily    Gastroesophageal reflux disease, esophagitis presence not specified       ranolazine 500 MG 12 hr tablet    RANEXA     Take 1 tablet (500 mg) by mouth 2 times daily        sertraline 50 MG tablet    ZOLOFT    90 tablet    Take 1 tablet (50 mg) by mouth daily    Depression, unspecified depression type       sodium chloride 0.65 % nasal spray    OCEAN    3 Bottle    Spray in each nostril 3-4 times daily as needed    Preventive measure       TAB-A-MARZENA Tabs     90 tablet    Take 1 tablet by mouth daily    Routine adult health maintenance       tiotropium 18 MCG capsule    SPIRIVA    90 capsule    Inhale 1 capsule (18 mcg) into the lungs daily    Chronic obstructive pulmonary disease, unspecified COPD type (H)       traZODone 50 MG tablet    DESYREL    180 tablet    Take 1/2 to 2 tablets by mouth at bedtime as needed for sleep.    Insomnia, unspecified type       vitamin D 2000 units tablet     100 tablet    Take 2,000 Units by mouth daily    Preventive measure

## 2018-07-17 NOTE — PROGRESS NOTES
S: Srikanth Graves is a 73 year old male who returns for follow up of  Urology visit for dysuria and ED   He was told that she he adkins STI/but letter fron urology  states that He has UTI and was given antibiotics  Patients states that main concern today is  Fu urology visit   No Dysuria one partner.    PMHX/PSHX/MEDS/ALLERGIES/SHX/FHX reviewed and updated in Epic.      ROS:  General: No fevers, chills  Head: No headache  Ears: No acute change in hearing.    CV: No chest pain or palpitations.  Resp: No shortness of breath.  No cough. No hemoptysis.  GI: No nausea, vomiting, constipation, diarrhea  : No urinary pains    O: /80  Pulse 57  Temp 98.3  F (36.8  C) (Oral)  Resp 16  Wt 182 lb (82.6 kg)  SpO2 97%  BMI 31.85 kg/m2   Gen:  Well nourished and in NAD    CV:  RRR  - no murmurs, rubs, or gallups,   Pulm:  CTAB, no wheezes/rales/rhonchi, good air entry   ABD: soft, nontender, no masses, no rebound, BS intact throughout  Extrem: no cyanosis, edema or clubbing  Psych: Euthymic      1Back pain/upper; tylenol   continue tylenol  2 UTI; resolved  3 STI/urine per request    RTC in 1 weeks, for follow up of results or sooner if develops new or worsening symptoms.    Rohit Pastor

## 2018-07-18 LAB
C TRACH RRNA SPEC QL NAA+PROBE: NEGATIVE
N GONORRHOEA RRNA SPEC QL NAA+PROBE: NEGATIVE

## 2018-08-03 ENCOUNTER — MEDICAL CORRESPONDENCE (OUTPATIENT)
Dept: HEALTH INFORMATION MANAGEMENT | Facility: CLINIC | Age: 73
End: 2018-08-03

## 2018-08-03 ENCOUNTER — OFFICE VISIT - HEALTHEAST (OUTPATIENT)
Dept: PHYSICAL THERAPY | Facility: REHABILITATION | Age: 73
End: 2018-08-03

## 2018-08-03 DIAGNOSIS — R29.3 POOR POSTURE: ICD-10-CM

## 2018-08-03 DIAGNOSIS — R51.9 CHRONIC INTRACTABLE HEADACHE, UNSPECIFIED HEADACHE TYPE: ICD-10-CM

## 2018-08-03 DIAGNOSIS — G89.29 CHRONIC INTRACTABLE HEADACHE, UNSPECIFIED HEADACHE TYPE: ICD-10-CM

## 2018-08-03 DIAGNOSIS — M53.82 WEAKNESS OF NECK: ICD-10-CM

## 2018-08-03 DIAGNOSIS — M54.2 CERVICALGIA: ICD-10-CM

## 2018-08-17 ENCOUNTER — OFFICE VISIT - HEALTHEAST (OUTPATIENT)
Dept: PHYSICAL THERAPY | Facility: REHABILITATION | Age: 73
End: 2018-08-17

## 2018-08-17 DIAGNOSIS — R51.9 CHRONIC INTRACTABLE HEADACHE, UNSPECIFIED HEADACHE TYPE: ICD-10-CM

## 2018-08-17 DIAGNOSIS — R29.3 POOR POSTURE: ICD-10-CM

## 2018-08-17 DIAGNOSIS — M54.2 CERVICALGIA: ICD-10-CM

## 2018-08-17 DIAGNOSIS — G89.29 CHRONIC INTRACTABLE HEADACHE, UNSPECIFIED HEADACHE TYPE: ICD-10-CM

## 2018-08-17 DIAGNOSIS — M53.82 WEAKNESS OF NECK: ICD-10-CM

## 2018-08-24 ENCOUNTER — OFFICE VISIT - HEALTHEAST (OUTPATIENT)
Dept: PHYSICAL THERAPY | Facility: REHABILITATION | Age: 73
End: 2018-08-24

## 2018-08-24 DIAGNOSIS — R29.3 POOR POSTURE: ICD-10-CM

## 2018-08-24 DIAGNOSIS — G89.29 CHRONIC INTRACTABLE HEADACHE, UNSPECIFIED HEADACHE TYPE: ICD-10-CM

## 2018-08-24 DIAGNOSIS — M53.82 WEAKNESS OF NECK: ICD-10-CM

## 2018-08-24 DIAGNOSIS — M54.2 CERVICALGIA: ICD-10-CM

## 2018-08-24 DIAGNOSIS — R51.9 CHRONIC INTRACTABLE HEADACHE, UNSPECIFIED HEADACHE TYPE: ICD-10-CM

## 2018-08-28 ENCOUNTER — MEDICAL CORRESPONDENCE (OUTPATIENT)
Dept: HEALTH INFORMATION MANAGEMENT | Facility: CLINIC | Age: 73
End: 2018-08-28

## 2018-08-29 ENCOUNTER — OFFICE VISIT (OUTPATIENT)
Dept: FAMILY MEDICINE | Facility: CLINIC | Age: 73
End: 2018-08-29
Payer: COMMERCIAL

## 2018-08-29 VITALS
HEART RATE: 65 BPM | DIASTOLIC BLOOD PRESSURE: 81 MMHG | WEIGHT: 184.8 LBS | OXYGEN SATURATION: 97 % | SYSTOLIC BLOOD PRESSURE: 138 MMHG | BODY MASS INDEX: 32.34 KG/M2 | RESPIRATION RATE: 16 BRPM | TEMPERATURE: 98.3 F
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
## 2018-08-29 DIAGNOSIS — T14.8XXA BRUISE: Primary | ICD-10-CM

## 2018-08-29 LAB
% GRANULOCYTES: 55.5 %G (ref 40–75)
GRANULOCYTES #: 2.6 K/UL (ref 1.6–8.3)
HCT VFR BLD AUTO: 46 % (ref 40–53)
HEMOGLOBIN: 14.8 G/DL (ref 13.3–17.7)
LYMPHOCYTES # BLD AUTO: 1.6 K/UL (ref 0.8–5.3)
LYMPHOCYTES NFR BLD AUTO: 34.8 %L (ref 20–48)
MCH RBC QN AUTO: 30.6 PG (ref 26.5–35)
MCHC RBC AUTO-ENTMCNC: 32.2 G/DL (ref 32–36)
MCV RBC AUTO: 95.1 FL (ref 78–100)
MID #: 0.4 K/UL (ref 0–2.2)
MID %: 9.7 %M (ref 0–20)
PLATELET # BLD AUTO: 199 K/UL (ref 150–450)
RBC # BLD AUTO: 4.8 M/UL (ref 4.4–5.9)
WBC # BLD AUTO: 4.6 K/UL (ref 4–11)

## 2018-08-29 ASSESSMENT — PAIN SCALES - GENERAL: PAINLEVEL: EXTREME PAIN (8)
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
## 2018-08-29 NOTE — PATIENT INSTRUCTIONS
We have expanded your Physical therapy to include therapy for chest wall pain    PHYSICAL THERAPY REFERRAL   August 31, 2018 at 11:25 am Referral for Physical Therapy faxed to [facility] at [phone] who will contact patient to schedule.

## 2018-08-30 ENCOUNTER — TRANSFERRED RECORDS (OUTPATIENT)
Dept: HEALTH INFORMATION MANAGEMENT | Facility: CLINIC | Age: 73
End: 2018-08-30

## 2018-08-30 ENCOUNTER — OFFICE VISIT - HEALTHEAST (OUTPATIENT)
Dept: PHYSICAL THERAPY | Facility: REHABILITATION | Age: 73
End: 2018-08-30

## 2018-08-30 DIAGNOSIS — G89.29 CHRONIC INTRACTABLE HEADACHE, UNSPECIFIED HEADACHE TYPE: ICD-10-CM

## 2018-08-30 DIAGNOSIS — M54.2 CERVICALGIA: ICD-10-CM

## 2018-08-30 DIAGNOSIS — R29.3 POOR POSTURE: ICD-10-CM

## 2018-08-30 DIAGNOSIS — M53.82 WEAKNESS OF NECK: ICD-10-CM

## 2018-08-30 DIAGNOSIS — R51.9 CHRONIC INTRACTABLE HEADACHE, UNSPECIFIED HEADACHE TYPE: ICD-10-CM

## 2018-08-31 ENCOUNTER — OFFICE VISIT (OUTPATIENT)
Dept: FAMILY MEDICINE | Facility: CLINIC | Age: 73
End: 2018-08-31
Payer: COMMERCIAL

## 2018-08-31 ENCOUNTER — OFFICE VISIT (OUTPATIENT)
Dept: PHARMACY | Facility: CLINIC | Age: 73
End: 2018-08-31
Payer: COMMERCIAL

## 2018-08-31 ENCOUNTER — AMBULATORY - HEALTHEAST (OUTPATIENT)
Dept: ADMINISTRATIVE | Facility: REHABILITATION | Age: 73
End: 2018-08-31

## 2018-08-31 VITALS
RESPIRATION RATE: 16 BRPM | SYSTOLIC BLOOD PRESSURE: 156 MMHG | OXYGEN SATURATION: 96 % | HEART RATE: 58 BPM | BODY MASS INDEX: 32.65 KG/M2 | DIASTOLIC BLOOD PRESSURE: 81 MMHG | TEMPERATURE: 98.5 F | WEIGHT: 186.6 LBS

## 2018-08-31 DIAGNOSIS — R07.89 CHEST WALL PAIN: ICD-10-CM

## 2018-08-31 DIAGNOSIS — I25.118 CORONARY ARTERY DISEASE OF NATIVE ARTERY OF NATIVE HEART WITH STABLE ANGINA PECTORIS (H): Primary | ICD-10-CM

## 2018-08-31 DIAGNOSIS — R10.32 LEFT GROIN PAIN: Primary | ICD-10-CM

## 2018-08-31 DIAGNOSIS — R10.9 FLANK PAIN: ICD-10-CM

## 2018-08-31 DIAGNOSIS — G89.29 CHRONIC LOW BACK PAIN, UNSPECIFIED BACK PAIN LATERALITY, WITH SCIATICA PRESENCE UNSPECIFIED: ICD-10-CM

## 2018-08-31 DIAGNOSIS — I25.118 CORONARY ARTERY DISEASE OF NATIVE ARTERY OF NATIVE HEART WITH STABLE ANGINA PECTORIS (H): ICD-10-CM

## 2018-08-31 DIAGNOSIS — M54.5 CHRONIC LOW BACK PAIN, UNSPECIFIED BACK PAIN LATERALITY, WITH SCIATICA PRESENCE UNSPECIFIED: ICD-10-CM

## 2018-08-31 DIAGNOSIS — M54.2 CERVICALGIA: ICD-10-CM

## 2018-08-31 DIAGNOSIS — I10 ESSENTIAL HYPERTENSION: ICD-10-CM
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
## 2018-08-31 RX ORDER — ISOSORBIDE MONONITRATE 60 MG/1
60 TABLET, EXTENDED RELEASE ORAL DAILY
Qty: 30 TABLET | Refills: 3 | Status: SHIPPED | OUTPATIENT
Start: 2018-08-31 | End: 2019-01-30

## 2018-08-31 RX ORDER — ROSUVASTATIN CALCIUM 10 MG/1
10 TABLET, COATED ORAL DAILY
Qty: 90 TABLET | Refills: 3 | Status: SHIPPED | OUTPATIENT
Start: 2018-08-31 | End: 2019-03-01

## 2018-08-31 RX ORDER — ASPIRIN 81 MG
TABLET,CHEWABLE ORAL 3 TIMES DAILY PRN
Qty: 42.5 G | Refills: 1 | Status: SHIPPED | OUTPATIENT
Start: 2018-08-31 | End: 2019-01-02

## 2018-08-31 RX ORDER — LIDOCAINE 50 MG/G
OINTMENT TOPICAL
Qty: 30 G | Refills: 3 | Status: SHIPPED | OUTPATIENT
Start: 2018-08-31 | End: 2018-10-11
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
## 2018-08-31 NOTE — PATIENT INSTRUCTIONS
STOP taking the atorvastatin and START taking the rosuvastatin. Your capsaicin and lidocaine creams have been refilled.  Stop potassium.  We will recheck potassium at your next visit.  INCREASE imdur to 60mg daily.  You can take 2 of the 30mg tablets at the same time until gone and then start new 60mg tablets.      [facility] is working on getting you the TENS unit.

## 2018-08-31 NOTE — MR AVS SNAPSHOT
After Visit Summary   2018    Srikanth Graves    MRN: [id number]           Patient Information     Date Of Birth          1945        Visit Information        Provider Department      2018 2:10 PM Alba Angelo MD [facility]        Today's Diagnoses     Left groin pain    -  1    Coronary artery disease of native artery of native heart with stable angina pectoris (H)        Flank pain        Chronic low back pain, unspecified back pain laterality, with sciatica presence unspecified        Essential hypertension        Cervicalgia          Care Instructions    STOP taking the atorvastatin and START taking the rosuvastatin. Your capsaicin and lidocaine creams have been refilled.  Stop potassium.  We will recheck potassium at your next visit.  INCREASE imdur to 60mg daily.  You can take 2 of the 30mg tablets at the same time until gone and then start new 60mg tablets.      [facility] at  is working on getting you the TENS unit.              Follow-ups after your visit        Who to contact     Please call your clinic at [phone] to:    Ask questions about your health    Make or cancel appointments    Discuss your medicines    Learn about your test results    Speak to your doctor            Additional Information About Your Visit        MyChar[employer]chron[employer] Information     RemitPro is an electronic gateway that provides easy, online access to your medical records. With RemitPro, you can request a clinic appointment, read your test results, renew a prescription or communicate with your care team.     To sign up for RemitPro visit the website at [email or web address]   You will be asked to enter the access code listed below, as well as some personal information. Please follow the directions to create your username and password.     Your access code is: [email or web address]  Expires: 2018  8:49 AM     Your access code will  in 90 days. If you need help or a new code, please contact your  [facility] or call [phone] for assistance.        Care EveryWhere ID     This is your Care EveryWhere ID. This could be used by other organizations to access your [facility] medical records  [id number]        Your Vitals Were     Pulse Temperature Respirations Pulse Oximetry BMI (Body Mass Index)       58 98.5  F (36.9  C) (Oral) 16 96% 32.65 kg/m2        Blood Pressure from Last 3 Encounters:   08/31/18 156/81   08/29/18 138/81   07/17/18 167/75    Weight from Last 3 Encounters:   08/31/18 186 lb 9.6 oz (84.6 kg)   08/29/18 184 lb 12.8 oz (83.8 kg)   07/17/18 182 lb (82.6 kg)              We Performed the Following     XR Hip Right 2-3 Views          Today's Medication Changes          These changes are accurate as of 8/31/18  2:53 PM.  If you have any questions, ask your nurse or doctor.               Start taking these medicines.        Dose/Directions    order for DME   Used for:  Cervicalgia   Started by:  Alba Angelo MD        Equipment being ordered: TENS   Quantity:  1 Device   Refills:  0       rosuvastatin 10 MG tablet   Commonly known as:  CRESTOR   Used for:  Coronary artery disease of native artery of native heart with stable angina pectoris (H)   Started by:  Alba Angelo MD        Dose:  10 mg   Take 1 tablet (10 mg) by mouth daily   Quantity:  90 tablet   Refills:  3         These medicines have changed or have updated prescriptions.        Dose/Directions    isosorbide mononitrate 60 MG 24 hr tablet   Commonly known as:  IMDUR   This may have changed:    - medication strength  - how much to take   Used for:  Coronary artery disease of native artery of native heart with stable angina pectoris (H), Essential hypertension   Changed by:  Alba Angelo MD        Dose:  60 mg   Take 1 tablet (60 mg) by mouth daily   Quantity:  30 tablet   Refills:  3         Stop taking these medicines if you haven't already. Please contact your care team if you have  questions.     potassium chloride SA 10 MEQ CR tablet   Commonly known as:  K-DUR/KLOR-CON M   Stopped by:  Alba Angelo MD                Where to get your medicines      These medications were sent to [facility] [place], [place] - [address], [place] [address]     Phone:  [phone]     Capsaicin 0.1 % cream    isosorbide mononitrate 60 MG 24 hr tablet    lidocaine 5 % ointment    rosuvastatin 10 MG tablet         Some of these will need a paper prescription and others can be bought over the counter.  Ask your nurse if you have questions.     Bring a paper prescription for each of these medications     order for DME                Primary Care Provider Office Phone # Fax #    Rohit Pastor [phone]       [address] [place] [id number]        Equal Access to Services     STEPHANIE FLOWERS AH: Hadii aad ku hadasho Sonika, waaxda luqadaha, qaybta kaalmada adeivyyakeyur, evens carvajal . So [facility] [phone].    ATENCIÓN: Si habla español, tiene a andres disposición servicios gratuitos de asistencia lingüística. [facility] [phone].    We comply with applicable federal civil rights laws and [place] laws. We do not discriminate on the basis of race, color, national origin, age, disability, sex, sexual orientation, or gender identity.            Thank you!     Thank you for choosing [facility]  for your care. Our goal is always to provide you with excellent care. Hearing back from our patients is one way we can continue to improve our services. Please take a few minutes to complete the written survey that you may receive in the mail after your visit with us. Thank you!             Your Updated Medication List - Protect others around you: Learn how to safely use, store and throw away your medicines at www.disposemymeds.org.          This list is accurate as of 8/31/18  2:53 PM.  Always use your most recent med list.                    Brand Name Dispense Instructions for use Diagnosis    acetaminophen 500 MG tablet    TYLENOL    180 tablet    Take 2 tablets (1,000 mg) by mouth 3 times daily    Chronic low back pain, unspecified back pain laterality, with sciatica presence unspecified       albuterol 108 (90 Base) MCG/ACT inhaler    PROAIR HFA/PROVENTIL HFA/VENTOLIN HFA    3 Inhaler    INHALE TWO PUFFS BY MOUTH FOUR TIMES A DAY AS NEEDED    Chronic obstructive pulmonary disease, unspecified COPD type (H)       aspirin 81 MG tablet     90 tablet    Take 1 tablet (81 mg) by mouth daily    Coronary artery disease of native artery of native heart with stable angina pectoris (H)       atorvastatin 20 MG tablet    LIPITOR    90 tablet    Take 1 tablet (20 mg) by mouth daily    Coronary artery disease of native artery of native heart with stable angina pectoris (H)       Capsaicin 0.1 % cream    CAPSAICIN HP    42.5 g    Apply topically 3 times daily as needed (pain)    Flank pain       clopidogrel 75 MG tablet    PLAVIX    90 tablet    Take 1 tablet (75 mg) by mouth daily    Coronary artery disease of native artery of native heart with stable angina pectoris (H)       docusate sodium 100 MG tablet    COLACE    60 tablet    Take 100 mg by mouth 2 times daily as needed for constipation    Other constipation       dorzolamide-timolol 2-0.5 % ophthalmic solution    COSOPT    1 Bottle    PLACE 1 DROP IN THE RIGHT EYE ONCE DAILY    Primary open angle glaucoma of right eye, mild stage       fluticasone 50 MCG/ACT spray    FLONASE    1 Bottle    Spray 2 sprays into both nostrils daily    Post-nasal drip       furosemide 20 MG tablet    LASIX    90 tablet    Take 1 tablet (20 mg) by mouth daily    Essential hypertension       HYDROCERIN Crea     180 g    Apply topically 3 times daily as needed    Dry skin       isosorbide mononitrate 60 MG 24 hr tablet    IMDUR    30 tablet    Take 1 tablet (60 mg) by mouth daily    Coronary artery disease of native  artery of native heart with stable angina pectoris (H), Essential hypertension       lidocaine 5 % ointment    XYLOCAINE    30 g    Apply to affected area three times a day as needed    Chronic low back pain, unspecified back pain laterality, with sciatica presence unspecified       lisinopril 40 MG tablet    PRINIVIL/ZESTRIL    90 tablet    Take 1 tablet (40 mg) by mouth daily    Essential hypertension       metoprolol tartrate 100 MG tablet    LOPRESSOR    90 tablet    Take 0.5 tablets (50 mg) by mouth 2 times daily    Essential hypertension       mometasone-formoterol 200-5 MCG/ACT oral inhaler    DULERA    3 Inhaler    INHALE TWO PUFFS BY MOUTH TWICE A DAY    Chronic obstructive pulmonary disease, unspecified COPD type (H)       nitroGLYcerin 0.4 MG/SPRAY spray    NITROLINGUAL     DISSOLVE 1 SPRAY UNDER TONGUE EVERY 5 MINUTES AS NEEDED FOR CHEST PAIN        order for DME     1 Units    TENS unit    Chronic bilateral low back pain without sciatica       order for DME     1 Units    Equipment being ordered: Home Blood pressure monitor    Essential hypertension       order for DME     1 Device    Equipment being ordered: TENS    Cervicalgia       polyvinyl alcohol 1.4 % ophthalmic solution    LIQUIFILM TEARS    15 mL    Place 1 drop into the right eye as needed for dry eyes    Dry eyes       ranitidine 150 MG tablet    ZANTAC    90 tablet    Take 1 tablet (150 mg) by mouth daily    Gastroesophageal reflux disease, esophagitis presence not specified       ranolazine 500 MG 12 hr tablet    RANEXA     Take 500 mg by mouth daily        rosuvastatin 10 MG tablet    CRESTOR    90 tablet    Take 1 tablet (10 mg) by mouth daily    Coronary artery disease of native artery of native heart with stable angina pectoris (H)       sertraline 50 MG tablet    ZOLOFT    90 tablet    Take 1 tablet (50 mg) by mouth daily    Depression, unspecified depression type       sodium chloride 0.65 % nasal spray    OCEAN    3 Bottle    [place]  in each nostril 3-4 times daily as needed    Preventive measure       TAB-A-MARZENA Tabs     90 tablet    Take 1 tablet by mouth daily    Routine adult health maintenance       tiotropium 18 MCG capsule    SPIRIVA    90 capsule    Inhale 1 capsule (18 mcg) into the lungs daily    Chronic obstructive pulmonary disease, unspecified COPD type (H)       traZODone 50 MG tablet    DESYREL    180 tablet    Take 1/2 to 2 tablets by mouth at bedtime as needed for sleep.    Insomnia, unspecified type       vitamin D 2000 units tablet     100 tablet    Take 2,000 Units by mouth daily    Preventive measure

## 2018-08-31 NOTE — MR AVS SNAPSHOT
After Visit Summary   2018    Srikanth Graves    MRN: [id number]           Patient Information     Date Of Birth          1945        Visit Information        Provider Department      2018 2:30 PM Cata Garcia, [facility]        Today's Diagnoses     Coronary artery disease of native artery of native heart with stable angina pectoris (H)    -  1       Follow-ups after your visit        Who to contact     Please call your clinic at [phone] to:    Ask questions about your health    Make or cancel appointments    Discuss your medicines    Learn about your test results    Speak to your doctor            Additional Information About Your Visit        MyChart Information     [employer] is an electronic gateway that provides easy, online access to your medical records. With [employer], you can request a clinic appointment, read your test results, renew a prescription or communicate with your care team.     To sign up for [employer] visit the website at [email or web address]   You will be asked to enter the access code listed below, as well as some personal information. Please follow the directions to create your username and password.     Your access code is: [email or web address]  Expires: 2018  8:49 AM     Your access code will  in 90 days. If you need help or a new code, please contact your [facility] or call [phone] for assistance.        Care EveryWhere ID     This is your Care EveryWhere ID. This could be used by other organizations to access your [place] medical records  [id number]         Blood Pressure from Last 3 Encounters:   18 156/81   18 138/81   18 167/75    Weight from Last 3 Encounters:   18 186 lb 9.6 oz (84.6 kg)   18 184 lb 12.8 oz (83.8 kg)   18 182 lb (82.6 kg)              We Performed the Following     MTM, EA ADDITIONAL 15 MIN (07391) x 3 (= 45 min.)          Today's Medication  Changes          These changes are accurate as of 8/31/18  3:18 PM.  If you have any questions, ask your nurse or doctor.               Start taking these medicines.        Dose/Directions    order for DME   Used for:  Cervicalgia   Started by:  Alba Angelo MD        Equipment being ordered: TENS   Quantity:  1 Device   Refills:  0       rosuvastatin 10 MG tablet   Commonly known as:  CRESTOR   Used for:  Coronary artery disease of native artery of native heart with stable angina pectoris (H)   Started by:  Alba Angelo MD        Dose:  10 mg   Take 1 tablet (10 mg) by mouth daily   Quantity:  90 tablet   Refills:  3         These medicines have changed or have updated prescriptions.        Dose/Directions    isosorbide mononitrate 60 MG 24 hr tablet   Commonly known as:  IMDUR   This may have changed:    - medication strength  - how much to take   Used for:  Coronary artery disease of native artery of native heart with stable angina pectoris (H), Essential hypertension   Changed by:  Alba Angelo MD        Dose:  60 mg   Take 1 tablet (60 mg) by mouth daily   Quantity:  30 tablet   Refills:  3         Stop taking these medicines if you haven't already. Please contact your care team if you have questions.     potassium chloride SA 10 MEQ CR tablet   Commonly known as:  K-DUR/KLOR-CON M   Stopped by:  Alba Angelo MD                Where to get your medicines      These medications were sent to [facility] [place], [place] - [address], [place] [address]     Phone:  [phone]     Capsaicin 0.1 % cream    isosorbide mononitrate 60 MG 24 hr tablet    lidocaine 5 % ointment    rosuvastatin 10 MG tablet         Some of these will need a paper prescription and others can be bought over the counter.  Ask your nurse if you have questions.     Bring a paper prescription for each of these medications     order for DME                Primary Care Provider  Office Phone # Fax #    Rohit Pastor [phone]       [address] [place] [address]        Equal Access to Services     STEPHANIE FLOWERS : Hadii aad ku hadrissaboyd Sanabria, colemankeyur valdiviaghassanha, ambreen kajayy tabithasnow, evens michaelin hayaaester luivy poncemaria a salomon. So [facility] [phone].    ATENCIÓN: Si habla español, tiene a andres disposición servicios gratuitos de asistencia lingüística. Llame al [phone].    We comply with applicable federal civil rights laws and [place] laws. We do not discriminate on the basis of race, color, national origin, age, disability, sex, sexual orientation, or gender identity.            Thank you!     Thank you for choosing [facility]  for your care. Our goal is always to provide you with excellent care. Hearing back from our patients is one way we can continue to improve our services. Please take a few minutes to complete the written survey that you may receive in the mail after your visit with us. Thank you!             Your Updated Medication List - Protect others around you: Learn how to safely use, store and throw away your medicines at www.disposemymeds.org.          This list is accurate as of 8/31/18  3:18 PM.  Always use your most recent med list.                   Brand Name Dispense Instructions for use Diagnosis    acetaminophen 500 MG tablet    TYLENOL    180 tablet    Take 2 tablets (1,000 mg) by mouth 3 times daily    Chronic low back pain, unspecified back pain laterality, with sciatica presence unspecified       albuterol 108 (90 Base) MCG/ACT inhaler    PROAIR HFA/PROVENTIL HFA/VENTOLIN HFA    3 Inhaler    INHALE TWO PUFFS BY MOUTH FOUR TIMES A DAY AS NEEDED    Chronic obstructive pulmonary disease, unspecified COPD type (H)       aspirin 81 MG tablet     90 tablet    Take 1 tablet (81 mg) by mouth daily    Coronary artery disease of native artery of native heart with stable angina pectoris (H)       atorvastatin 20 MG tablet    LIPITOR    90 tablet     Take 1 tablet (20 mg) by mouth daily    Coronary artery disease of native artery of native heart with stable angina pectoris (H)       Capsaicin 0.1 % cream    CAPSAICIN HP    42.5 g    Apply topically 3 times daily as needed (pain)    Flank pain       clopidogrel 75 MG tablet    PLAVIX    90 tablet    Take 1 tablet (75 mg) by mouth daily    Coronary artery disease of native artery of native heart with stable angina pectoris (H)       docusate sodium 100 MG tablet    COLACE    60 tablet    Take 100 mg by mouth 2 times daily as needed for constipation    Other constipation       dorzolamide-timolol 2-0.5 % ophthalmic solution    COSOPT    1 Bottle    PLACE 1 DROP IN THE RIGHT EYE ONCE DAILY    Primary open angle glaucoma of right eye, mild stage       fluticasone 50 MCG/ACT spray    FLONASE    1 Bottle    Spray 2 sprays into both nostrils daily    Post-nasal drip       furosemide 20 MG tablet    LASIX    90 tablet    Take 1 tablet (20 mg) by mouth daily    Essential hypertension       HYDROCERIN Crea     180 g    Apply topically 3 times daily as needed    Dry skin       isosorbide mononitrate 60 MG 24 hr tablet    IMDUR    30 tablet    Take 1 tablet (60 mg) by mouth daily    Coronary artery disease of native artery of native heart with stable angina pectoris (H), Essential hypertension       lidocaine 5 % ointment    XYLOCAINE    30 g    Apply to affected area three times a day as needed    Chronic low back pain, unspecified back pain laterality, with sciatica presence unspecified       lisinopril 40 MG tablet    PRINIVIL/ZESTRIL    90 tablet    Take 1 tablet (40 mg) by mouth daily    Essential hypertension       metoprolol tartrate 100 MG tablet    LOPRESSOR    90 tablet    Take 0.5 tablets (50 mg) by mouth 2 times daily    Essential hypertension       mometasone-formoterol 200-5 MCG/ACT oral inhaler    DULERA    3 Inhaler    INHALE TWO PUFFS BY MOUTH TWICE A DAY    Chronic obstructive pulmonary disease, unspecified  COPD type (H)       nitroGLYcerin 0.4 MG/SPRAY spray    NITROLINGUAL     DISSOLVE 1 SPRAY UNDER TONGUE EVERY 5 MINUTES AS NEEDED FOR CHEST PAIN        order for DME     1 Units    TENS unit    Chronic bilateral low back pain without sciatica       order for DME     1 Units    Equipment being ordered: Home Blood pressure monitor    Essential hypertension       order for DME     1 Device    Equipment being ordered: TENS    Cervicalgia       polyvinyl alcohol 1.4 % ophthalmic solution    LIQUIFILM TEARS    15 mL    Place 1 drop into the right eye as needed for dry eyes    Dry eyes       ranitidine 150 MG tablet    ZANTAC    90 tablet    Take 1 tablet (150 mg) by mouth daily    Gastroesophageal reflux disease, esophagitis presence not specified       ranolazine 500 MG 12 hr tablet    RANEXA     Take 500 mg by mouth daily        rosuvastatin 10 MG tablet    CRESTOR    90 tablet    Take 1 tablet (10 mg) by mouth daily    Coronary artery disease of native artery of native heart with stable angina pectoris (H)       sertraline 50 MG tablet    ZOLOFT    90 tablet    Take 1 tablet (50 mg) by mouth daily    Depression, unspecified depression type       sodium chloride 0.65 % nasal spray    OCEAN    3 Bottle    Spray in each nostril 3-4 times daily as needed    Preventive measure       TAB-A-MARZENA Tabs     90 tablet    Take 1 tablet by mouth daily    Routine adult health maintenance       tiotropium 18 MCG capsule    SPIRIVA    90 capsule    Inhale 1 capsule (18 mcg) into the lungs daily    Chronic obstructive pulmonary disease, unspecified COPD type (H)       traZODone 50 MG tablet    DESYREL    180 tablet    Take 1/2 to 2 tablets by mouth at bedtime as needed for sleep.    Insomnia, unspecified type       vitamin D 2000 units tablet     100 tablet    Take 2,000 Units by mouth daily    Preventive measure
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
## 2018-09-05 ENCOUNTER — TELEPHONE (OUTPATIENT)
Dept: FAMILY MEDICINE | Facility: CLINIC | Age: 73
End: 2018-09-05

## 2018-09-05 NOTE — TELEPHONE ENCOUNTER
Already provided capsaicin.  No alternative indicated.  No PA.  Please inform patient about it not being covered and no way that we can get it covered at this time.  Alba Angelo MD

## 2018-09-07 ENCOUNTER — OFFICE VISIT - HEALTHEAST (OUTPATIENT)
Dept: PHYSICAL THERAPY | Facility: REHABILITATION | Age: 73
End: 2018-09-07

## 2018-09-07 DIAGNOSIS — R29.3 POOR POSTURE: ICD-10-CM

## 2018-09-07 DIAGNOSIS — R07.89 CHEST WALL PAIN, CHRONIC: ICD-10-CM

## 2018-09-07 DIAGNOSIS — M62.81 MUSCLE WEAKNESS (GENERALIZED): ICD-10-CM

## 2018-09-07 DIAGNOSIS — M53.84 DECREASED ROM OF THORACIC SPINE: ICD-10-CM

## 2018-09-07 DIAGNOSIS — M94.0 COSTOCHONDRITIS: ICD-10-CM

## 2018-09-07 DIAGNOSIS — G89.29 CHEST WALL PAIN, CHRONIC: ICD-10-CM

## 2018-09-14 ENCOUNTER — OFFICE VISIT - HEALTHEAST (OUTPATIENT)
Dept: PHYSICAL THERAPY | Facility: REHABILITATION | Age: 73
End: 2018-09-14

## 2018-09-14 DIAGNOSIS — M62.81 MUSCLE WEAKNESS (GENERALIZED): ICD-10-CM

## 2018-09-14 DIAGNOSIS — M53.84 DECREASED ROM OF THORACIC SPINE: ICD-10-CM

## 2018-09-14 DIAGNOSIS — R29.3 POOR POSTURE: ICD-10-CM

## 2018-09-14 DIAGNOSIS — R51.9 CHRONIC INTRACTABLE HEADACHE, UNSPECIFIED HEADACHE TYPE: ICD-10-CM

## 2018-09-14 DIAGNOSIS — M94.0 COSTOCHONDRITIS: ICD-10-CM

## 2018-09-14 DIAGNOSIS — G89.29 CHRONIC INTRACTABLE HEADACHE, UNSPECIFIED HEADACHE TYPE: ICD-10-CM

## 2018-09-14 DIAGNOSIS — M53.82 WEAKNESS OF NECK: ICD-10-CM

## 2018-09-14 DIAGNOSIS — R07.89 CHEST WALL PAIN, CHRONIC: ICD-10-CM

## 2018-09-14 DIAGNOSIS — M54.2 CERVICALGIA: ICD-10-CM

## 2018-09-14 DIAGNOSIS — G89.29 CHEST WALL PAIN, CHRONIC: ICD-10-CM

## 2018-09-17 ENCOUNTER — OFFICE VISIT (OUTPATIENT)
Dept: FAMILY MEDICINE | Facility: CLINIC | Age: 73
End: 2018-09-17
Payer: COMMERCIAL

## 2018-09-17 VITALS
BODY MASS INDEX: 32.44 KG/M2 | SYSTOLIC BLOOD PRESSURE: 173 MMHG | TEMPERATURE: 99.1 F | DIASTOLIC BLOOD PRESSURE: 86 MMHG | HEART RATE: 55 BPM | RESPIRATION RATE: 16 BRPM | WEIGHT: 185.4 LBS | OXYGEN SATURATION: 97 %

## 2018-09-17 DIAGNOSIS — M25.551 PAIN OF RIGHT HIP JOINT: Primary | ICD-10-CM

## 2018-09-17 NOTE — MR AVS SNAPSHOT
After Visit Summary   2018    Srikanth Graves    MRN: [id number]           Patient Information     Date Of Birth          1945        Visit Information        Provider Department      2018 1:10 PM Rohit Pastor MD [facility]        Today's Diagnoses     Pain of right hip joint    -  1      Care Instructions    R hip pain/stable exam   x rays donecome in one week   My unofficial readings today: hip Is ok on right  Left has replacement   Has stents in ateries          Follow-ups after your visit        Who to contact     Please call your clinic at [phone] to:    Ask questions about your health    Make or cancel appointments    Discuss your medicines    Learn about your test results    Speak to your doctor            Additional Information About Your Visit        MyChart Information     [employer] is an electronic gateway that provides easy, online access to your medical records. With [employer], you can request a clinic appointment, read your test results, renew a prescription or communicate with your care team.     To sign up for [employer] visit the website at [email or web address]   You will be asked to enter the access code listed below, as well as some personal information. Please follow the directions to create your username and password.     Your access code is: [email or web address]  Expires: 2018  8:49 AM     Your access code will  in 90 days. If you need help or a new code, please contact your [facility] or call [phone] for assistance.        Care EveryWhere ID     This is your Care EveryWhere ID. This could be used by other organizations to access your [place] medical records  [id number]        Your Vitals Were     Pulse Temperature Respirations Pulse Oximetry BMI (Body Mass Index)       55 99.1  F (37.3  C) (Oral) 16 97% 32.44 kg/m2        Blood Pressure from Last 3 Encounters:   18 173/86   18 156/81   18  138/81    Weight from Last 3 Encounters:   09/17/18 185 lb 6.4 oz (84.1 kg)   08/31/18 186 lb 9.6 oz (84.6 kg)   08/29/18 184 lb 12.8 oz (83.8 kg)              Today, you had the following     No orders found for display       Primary Care Provider Office Phone # Fax #    Rohit Pastor [phone]       [address] [place] [address]        Equal Access to Services     STEPHANIE FLOWERS : Hadii aad ku hadasho Soomaali, waaxda luqadaha, qaybta kaalmada adeegyada, waxay idiin hayaan adeeg diana carvajal . So [facility] [phone].    ATENCIÓN: Si habla español, tiene a andres disposición servicios gratuitos de asistencia lingüística. [facility] [phone].    We comply with applicable federal civil rights laws and [place] laws. We do not discriminate on the basis of race, color, national origin, age, disability, sex, sexual orientation, or gender identity.            Thank you!     Thank you for choosing [facility]  for your care. Our goal is always to provide you with excellent care. Hearing back from our patients is one way we can continue to improve our services. Please take a few minutes to complete the written survey that you may receive in the mail after your visit with us. Thank you!             Your Updated Medication List - Protect others around you: Learn how to safely use, store and throw away your medicines at www.disposemymeds.org.          This list is accurate as of 9/17/18  1:51 PM.  Always use your most recent med list.                   Brand Name Dispense Instructions for use Diagnosis    acetaminophen 500 MG tablet    TYLENOL    180 tablet    Take 2 tablets (1,000 mg) by mouth 3 times daily    Chronic low back pain, unspecified back pain laterality, with sciatica presence unspecified       albuterol 108 (90 Base) MCG/ACT inhaler    PROAIR HFA/PROVENTIL HFA/VENTOLIN HFA    3 Inhaler    INHALE TWO PUFFS BY MOUTH FOUR TIMES A DAY AS NEEDED    Chronic obstructive pulmonary disease,  unspecified COPD type (H)       aspirin 81 MG tablet     90 tablet    Take 1 tablet (81 mg) by mouth daily    Coronary artery disease of native artery of native heart with stable angina pectoris (H)       atorvastatin 20 MG tablet    LIPITOR    90 tablet    Take 1 tablet (20 mg) by mouth daily    Coronary artery disease of native artery of native heart with stable angina pectoris (H)       Capsaicin 0.1 % cream    CAPSAICIN HP    42.5 g    Apply topically 3 times daily as needed (pain)    Flank pain       clopidogrel 75 MG tablet    PLAVIX    90 tablet    Take 1 tablet (75 mg) by mouth daily    Coronary artery disease of native artery of native heart with stable angina pectoris (H)       docusate sodium 100 MG tablet    COLACE    60 tablet    Take 100 mg by mouth 2 times daily as needed for constipation    Other constipation       dorzolamide-timolol 2-0.5 % ophthalmic solution    COSOPT    1 Bottle    PLACE 1 DROP IN THE RIGHT EYE ONCE DAILY    Primary open angle glaucoma of right eye, mild stage       fluticasone 50 MCG/ACT spray    FLONASE    1 Bottle    Spray 2 sprays into both nostrils daily    Post-nasal drip       furosemide 20 MG tablet    LASIX    90 tablet    Take 1 tablet (20 mg) by mouth daily    Essential hypertension       HYDROCERIN Crea     180 g    Apply topically 3 times daily as needed    Dry skin       isosorbide mononitrate 60 MG 24 hr tablet    IMDUR    30 tablet    Take 1 tablet (60 mg) by mouth daily    Coronary artery disease of native artery of native heart with stable angina pectoris (H), Essential hypertension       lidocaine 5 % ointment    XYLOCAINE    30 g    Apply to affected area three times a day as needed    Chronic low back pain, unspecified back pain laterality, with sciatica presence unspecified       lisinopril 40 MG tablet    PRINIVIL/ZESTRIL    90 tablet    Take 1 tablet (40 mg) by mouth daily    Essential hypertension       metoprolol tartrate 100 MG tablet    LOPRESSOR     90 tablet    Take 0.5 tablets (50 mg) by mouth 2 times daily    Essential hypertension       mometasone-formoterol 200-5 MCG/ACT oral inhaler    DULERA    3 Inhaler    INHALE TWO PUFFS BY MOUTH TWICE A DAY    Chronic obstructive pulmonary disease, unspecified COPD type (H)       nitroGLYcerin 0.4 MG/SPRAY spray    NITROLINGUAL     DISSOLVE 1 SPRAY UNDER TONGUE EVERY 5 MINUTES AS NEEDED FOR CHEST PAIN        order for DME     1 Units    TENS unit    Chronic bilateral low back pain without sciatica       order for DME     1 Units    Equipment being ordered: Home Blood pressure monitor    Essential hypertension       order for DME     1 Device    Equipment being ordered: TENS    Cervicalgia       polyvinyl alcohol 1.4 % ophthalmic solution    LIQUIFILM TEARS    15 mL    Place 1 drop into the right eye as needed for dry eyes    Dry eyes       ranitidine 150 MG tablet    ZANTAC    90 tablet    Take 1 tablet (150 mg) by mouth daily    Gastroesophageal reflux disease, esophagitis presence not specified       ranolazine 500 MG 12 hr tablet    RANEXA     Take 500 mg by mouth daily        rosuvastatin 10 MG tablet    CRESTOR    90 tablet    Take 1 tablet (10 mg) by mouth daily    Coronary artery disease of native artery of native heart with stable angina pectoris (H)       sertraline 50 MG tablet    ZOLOFT    90 tablet    Take 1 tablet (50 mg) by mouth daily    Depression, unspecified depression type       sodium chloride 0.65 % nasal spray    OCEAN    3 Bottle    Spray in each nostril 3-4 times daily as needed    Preventive measure       TAB-A-MARZENA Tabs     90 tablet    Take 1 tablet by mouth daily    Routine adult health maintenance       tiotropium 18 MCG capsule    SPIRIVA    90 capsule    Inhale 1 capsule (18 mcg) into the lungs daily    Chronic obstructive pulmonary disease, unspecified COPD type (H)       traZODone 50 MG tablet    DESYREL    180 tablet    Take 1/2 to 2 tablets by mouth at bedtime as needed for sleep.     Insomnia, unspecified type       vitamin D 2000 units tablet     100 tablet    Take 2,000 Units by mouth daily    Preventive measure

## 2018-09-17 NOTE — PATIENT INSTRUCTIONS
R hip pain/stable exam   x rays donecome in one week   My unofficial readings today: hip Is ok on right  Left has replacement   Has stents in ateries

## 2018-09-17 NOTE — LETTER
September 19, 2018      Srikanth Graves  [address] [place] [address]        Dear Srikanth,    The x rays of your hips are ok.  As you You had replacement of your left hip    Will follow in clinic     If you have any questions, please call the clinic to make an appointment.    Sincerely,    Rohit Pastor MD

## 2018-09-17 NOTE — PROGRESS NOTES
S: Srikanth Graves is a 73 year old male who returns for follow up of  Right  hip pain that is going for a long time, recently seen for alex problem but did not stay for x rays   Long history of neck and back problem followed at [facility]. States also has remote surgery when he was a child ? Right lower quadrant/or right hip  Patients states that main concern today is R hip pain    PMHX/PSHX/MEDS/ALLERGIES/SHX/FHX reviewed and updated in Epic.      ROS:  General: No fevers, chills  Head: No headache  Ears: No acute change in hearing.    CV: No chest pain or palpitations.  Resp: No shortness of breath.  No cough. No hemoptysis.  GI: No nausea, vomiting, constipation, diarrhea  : No urinary pains    O: There were no vitals taken for this visit.   Gen:  Well nourished and in NAD    CV:  RRR  - no murmurs, rubs, or gallups,   Pulm:  CTAB, no wheezes/rales/rhonchi, good air entry   ABD: soft, nontender, no masses, no rebound, BS intact throughout  Extrem: no cyanosis, edema or clubbing  Psych: Euthymic    ambulate favoring rip  Hip/ hips FROM  (M25.551) Pain of right hip joint  (primary encounter diagnosis)  Comment:  Expect DJD  Plan: XR Hip Left 2-3 Views, XR Hip Right 2-3 Views   Will get both hips for comparison   Will wait for  Official radiology  reading          RTC in 1 to 2weeks, for follow up of  Hip painor sooner if develops new or worsening symptoms.    Rohit Pastor

## 2018-09-18 ENCOUNTER — OFFICE VISIT (OUTPATIENT)
Dept: FAMILY MEDICINE | Facility: CLINIC | Age: 73
End: 2018-09-18
Payer: COMMERCIAL

## 2018-09-18 VITALS
DIASTOLIC BLOOD PRESSURE: 72 MMHG | TEMPERATURE: 98.5 F | SYSTOLIC BLOOD PRESSURE: 140 MMHG | RESPIRATION RATE: 20 BRPM | HEART RATE: 57 BPM | WEIGHT: 186.6 LBS | BODY MASS INDEX: 32.65 KG/M2

## 2018-09-18 DIAGNOSIS — R31.0 GROSS HEMATURIA: Primary | ICD-10-CM

## 2018-09-18 DIAGNOSIS — N40.0 BENIGN PROSTATIC HYPERPLASIA WITHOUT LOWER URINARY TRACT SYMPTOMS: ICD-10-CM

## 2018-09-18 DIAGNOSIS — N30.01 ACUTE CYSTITIS WITH HEMATURIA: ICD-10-CM

## 2018-09-18 LAB
BACTERIA: NORMAL
BILIRUBIN UR: NEGATIVE
BLOOD UR: ABNORMAL
CASTS: NORMAL /LPF
CRYSTAL URINE: NORMAL /LPF
EPITHELIAL CELLS UR: NORMAL /LPF (ref 0–2)
GLUCOSE URINE: NEGATIVE
KETONES UR QL: NEGATIVE
LEUKOCYTE ESTERASE UR: ABNORMAL
MUCOUS URINE: NORMAL LPF
NITRITE UR QL STRIP: POSITIVE
PH UR STRIP: 5 [PH] (ref 5–7)
PROTEIN UR: ABNORMAL
RBC URINE: NORMAL /HPF
SP GR UR STRIP: 1.02
UROBILINOGEN UR STRIP-ACNC: ABNORMAL
WBC URINE: NORMAL /HPF

## 2018-09-18 RX ORDER — TERAZOSIN 2 MG/1
2 CAPSULE ORAL AT BEDTIME
Qty: 30 CAPSULE | Refills: 11 | Status: SHIPPED | OUTPATIENT
Start: 2018-09-18 | End: 2019-11-19

## 2018-09-18 RX ORDER — CIPROFLOXACIN 500 MG/1
500 TABLET, FILM COATED ORAL 2 TIMES DAILY
Qty: 28 TABLET | Refills: 0 | Status: SHIPPED | OUTPATIENT
Start: 2018-09-18 | End: 2019-01-30

## 2018-09-18 NOTE — MR AVS SNAPSHOT
After Visit Summary   9/18/2018    Srikanth Graves    MRN: [id number]           Patient Information     Date Of Birth          1945        Visit Information        Provider Department      9/18/2018 4:10 PM Alba Angelo MD [facility]        Today's Diagnoses     Gross hematuria    -  1    Acute cystitis with hematuria        Benign prostatic hyperplasia without lower urinary tract symptoms          Care Instructions    You definitely have a bladder infection.  You may also have a prostatitis causing the testicle pain and side pain.    Take ciprofloxacin (antibiotic) one pill twice a day for 14 days.    Restart terazosin 2 mg at bedtime.  Watch for light headness or dizziness.          Follow-ups after your visit        Follow-up notes from your care team     Return in about 2 weeks (around 10/2/2018) for BP Recheck, pelvic pain, urine infection.      Your next 10 appointments already scheduled     Sep 24, 2018  1:50 PM CDT   Return Visit with Rohit Pastor MD   [facility] ([facility])    [address] [place] [address]   [phone]              Who to contact     Please call your clinic at [phone] to:    Ask questions about your health    Make or cancel appointments    Discuss your medicines    Learn about your test results    Speak to your doctor            Additional Information About Your Visit        MyChart Information     [employer] is an electronic gateway that provides easy, online access to your medical records. With [employer], you can request a clinic appointment, read your test results, renew a prescription or communicate with your care team.     To sign up for [employer] visit the website at [email or web address]   You will be asked to enter the access code listed below, as well as some personal information. Please follow the directions to create your username and password.     Your access code is: [email or web address]  Expires: 11/27/2018  8:49 AM     Your access  code will  in 90 days. If you need help or a new code, please contact your [facility] or call [phone] for assistance.        Care EveryWhere ID     This is your Care EveryWhere ID. This could be used by other organizations to access your [facility] medical records  [id number]        Your Vitals Were     Pulse Temperature Respirations BMI (Body Mass Index)          57 98.5  F (36.9  C) 20 32.65 kg/m2         Blood Pressure from Last 3 Encounters:   18 140/72   18 173/86   18 156/81    Weight from Last 3 Encounters:   18 186 lb 9.6 oz (84.6 kg)   18 185 lb 6.4 oz (84.1 kg)   18 186 lb 9.6 oz (84.6 kg)              We Performed the Following     Urinalysis, Micro If ([facility])     Urine Culture ([facility])     Urine Microscopic ([facility])          Today's Medication Changes          These changes are accurate as of 18  4:48 PM.  If you have any questions, ask your nurse or doctor.               Start taking these medicines.        Dose/Directions    ciprofloxacin 500 MG tablet   Commonly known as:  CIPRO   Used for:  Gross hematuria, Acute cystitis with hematuria   Started by:  Alba Angelo MD        Dose:  500 mg   Take 1 tablet (500 mg) by mouth 2 times daily   Quantity:  28 tablet   Refills:  0       terazosin 2 MG capsule   Commonly known as:  HYTRIN   Used for:  Benign prostatic hyperplasia without lower urinary tract symptoms   Started by:  Alba Angelo MD        Dose:  2 mg   Take 1 capsule (2 mg) by mouth At Bedtime   Quantity:  30 capsule   Refills:  11            Where to get your medicines      These medications were sent to [facility] [place], [place] - [address], [place] [address]     Phone:  [phone]     ciprofloxacin 500 MG tablet    terazosin 2 MG capsule                Primary Care Provider Office Phone # Fax #    Rohit Pastor [phone]        [address] [facility] [address]        Equal Access to Services     STEPHANIE FLOWERS : Hadii aad ku hadrissaboyd Sanabria, waayannada lujenniferadaha, qaybta kevenmaevens gonzalez. So [facility] [phone].    ATENCIÓN: Si habla español, tiene a andres disposición servicios gratuitos de asistencia lingüística. Edwar al [phone].    We comply with applicable federal civil rights laws and [place] laws. We do not discriminate on the basis of race, color, national origin, age, disability, sex, sexual orientation, or gender identity.            Thank you!     Thank you for choosing [facility]  for your care. Our goal is always to provide you with excellent care. Hearing back from our patients is one way we can continue to improve our services. Please take a few minutes to complete the written survey that you may receive in the mail after your visit with us. Thank you!             Your Updated Medication List - Protect others around you: Learn how to safely use, store and throw away your medicines at www.disposemymeds.org.          This list is accurate as of 9/18/18  4:48 PM.  Always use your most recent med list.                   Brand Name Dispense Instructions for use Diagnosis    acetaminophen 500 MG tablet    TYLENOL    180 tablet    Take 2 tablets (1,000 mg) by mouth 3 times daily    Chronic low back pain, unspecified back pain laterality, with sciatica presence unspecified       albuterol 108 (90 Base) MCG/ACT inhaler    PROAIR HFA/PROVENTIL HFA/VENTOLIN HFA    3 Inhaler    INHALE TWO PUFFS BY MOUTH FOUR TIMES A DAY AS NEEDED    Chronic obstructive pulmonary disease, unspecified COPD type (H)       aspirin 81 MG tablet     90 tablet    Take 1 tablet (81 mg) by mouth daily    Coronary artery disease of native artery of native heart with stable angina pectoris (H)       atorvastatin 20 MG tablet    LIPITOR    90 tablet    Take 1 tablet (20 mg) by mouth daily    Coronary artery disease of native  artery of native heart with stable angina pectoris (H)       Capsaicin 0.1 % cream    CAPSAICIN HP    42.5 g    Apply topically 3 times daily as needed (pain)    Flank pain       ciprofloxacin 500 MG tablet    CIPRO    28 tablet    Take 1 tablet (500 mg) by mouth 2 times daily    Gross hematuria, Acute cystitis with hematuria       clopidogrel 75 MG tablet    PLAVIX    90 tablet    Take 1 tablet (75 mg) by mouth daily    Coronary artery disease of native artery of native heart with stable angina pectoris (H)       docusate sodium 100 MG tablet    COLACE    60 tablet    Take 100 mg by mouth 2 times daily as needed for constipation    Other constipation       dorzolamide-timolol 2-0.5 % ophthalmic solution    COSOPT    1 Bottle    PLACE 1 DROP IN THE RIGHT EYE ONCE DAILY    Primary open angle glaucoma of right eye, mild stage       fluticasone 50 MCG/ACT spray    FLONASE    1 Bottle    Spray 2 sprays into both nostrils daily    Post-nasal drip       furosemide 20 MG tablet    LASIX    90 tablet    Take 1 tablet (20 mg) by mouth daily    Essential hypertension       HYDROCERIN Crea     180 g    Apply topically 3 times daily as needed    Dry skin       isosorbide mononitrate 60 MG 24 hr tablet    IMDUR    30 tablet    Take 1 tablet (60 mg) by mouth daily    Coronary artery disease of native artery of native heart with stable angina pectoris (H), Essential hypertension       lidocaine 5 % ointment    XYLOCAINE    30 g    Apply to affected area three times a day as needed    Chronic low back pain, unspecified back pain laterality, with sciatica presence unspecified       lisinopril 40 MG tablet    PRINIVIL/ZESTRIL    90 tablet    Take 1 tablet (40 mg) by mouth daily    Essential hypertension       metoprolol tartrate 100 MG tablet    LOPRESSOR    90 tablet    Take 0.5 tablets (50 mg) by mouth 2 times daily    Essential hypertension       mometasone-formoterol 200-5 MCG/ACT oral inhaler    DULERA    3 Inhaler    INHALE TWO  PUFFS BY MOUTH TWICE A DAY    Chronic obstructive pulmonary disease, unspecified COPD type (H)       nitroGLYcerin 0.4 MG/SPRAY spray    NITROLINGUAL     DISSOLVE 1 SPRAY UNDER TONGUE EVERY 5 MINUTES AS NEEDED FOR CHEST PAIN        order for DME     1 Units    TENS unit    Chronic bilateral low back pain without sciatica       order for DME     1 Units    Equipment being ordered: Home Blood pressure monitor    Essential hypertension       order for DME     1 Device    Equipment being ordered: TENS    Cervicalgia       polyvinyl alcohol 1.4 % ophthalmic solution    LIQUIFILM TEARS    15 mL    Place 1 drop into the right eye as needed for dry eyes    Dry eyes       ranitidine 150 MG tablet    ZANTAC    90 tablet    Take 1 tablet (150 mg) by mouth daily    Gastroesophageal reflux disease, esophagitis presence not specified       ranolazine 500 MG 12 hr tablet    RANEXA     Take 500 mg by mouth daily        rosuvastatin 10 MG tablet    CRESTOR    90 tablet    Take 1 tablet (10 mg) by mouth daily    Coronary artery disease of native artery of native heart with stable angina pectoris (H)       sertraline 50 MG tablet    ZOLOFT    90 tablet    Take 1 tablet (50 mg) by mouth daily    Depression, unspecified depression type       sodium chloride 0.65 % nasal spray    OCEAN    3 Bottle    Spray in each nostril 3-4 times daily as needed    Preventive measure       TAB-A-MARZENA Tabs     90 tablet    Take 1 tablet by mouth daily    Routine adult health maintenance       terazosin 2 MG capsule    HYTRIN    30 capsule    Take 1 capsule (2 mg) by mouth At Bedtime    Benign prostatic hyperplasia without lower urinary tract symptoms       tiotropium 18 MCG capsule    SPIRIVA    90 capsule    Inhale 1 capsule (18 mcg) into the lungs daily    Chronic obstructive pulmonary disease, unspecified COPD type (H)       traZODone 50 MG tablet    DESYREL    180 tablet    Take 1/2 to 2 tablets by mouth at bedtime as needed for sleep.    Insomnia,  unspecified type       vitamin D 2000 units tablet     100 tablet    Take 2,000 Units by mouth daily    Preventive measure

## 2018-09-18 NOTE — PROGRESS NOTES
"    There are no exam notes on file for this visit.  Chief Complaint   Patient presents with     Kidney Problem     Blood in Urine,   \"penis feels different\".  Some pain x today. Has had some right sided pain on/off this year.     Blood pressure 140/72, pulse 57, temperature 98.5  F (36.9  C), resp. rate 20, weight 186 lb 9.6 oz (84.6 kg).                 HPI     Srikanth Graves is a 73 year old  male with a PMH significant for:     Patient Active Problem List   Diagnosis     Other constipation     Advance care planning     Bunion of great toe     Degeneration of cervical intervertebral disc     Chronic airway obstruction (H)     Chronic low back pain     Chronic pain disorder     Chronic obstructive pulmonary disease (H)     Coronary artery disease of native artery of native heart with stable angina pectoris (H)     Depression     DJD (degenerative joint disease), ankle and foot     Dyspnea on exertion     Edema     Esophageal reflux     Essential hypertension     Headache disorder     Peripheral vascular disease (H)     Primary open angle glaucoma of right eye, mild stage     Vitamin D deficiency     Cocaine abuse in remission     Coccydynia     Clavus     Eye globe prosthesis     Hallux valgus, acquired     Neck pain     Routine adult health maintenance     Controlled substance agreement terminated     He presents with  Hematuria, felt funny to pee at 3am but didn't turn the light on.  Noticed the blood in the urine at 6 AM void.  Bright red, more blood than urine.  No fevers, or flank pain.  Has not been sexually active recently.   Had h/o scrotal abscess remove 15 to 18 years ago.  Has some chronic soreness since that time. Not increased recently.  That pain can refer to the right lower groin.  Different pain then right side pain.     Chronic burning in his right side.  Had a kidney test at [facility] in the winter.  Told there was a knot on the right side last month and told it was hematoma and the bruising went away " in a few days but he still feels a little knot there.  Skin is tender over right side.  Not worse since hematuria.    Reviewed urology records from [facility].  Appears that he was taking terazosin 2mg at bedtime at that time, last visit 11/2017 for bph.  Also had chronic pelvic pain that they thought was somewhat improved by prostatitis treatment with antibiotics in the past.  They also sent him to pelvic floor PT.  He states that was not that helpful.  He doesn't remember them saying his pain in the testicle and pelvis was from the prostate.     He is planning cardiology follow up in oct or nov with [facility] cards.  No current CP.    PMH, Medications and Allergies were reviewed and updated as needed.                Physical Exam:     Vitals:    09/18/18 1437 09/18/18 1440   BP: 156/76 140/72   Pulse: 57 57   Resp: 20    Temp: 98.5  F (36.9  C)    Weight: 186 lb 9.6 oz (84.6 kg)      Body mass index is 32.65 kg/(m^2).    Exam:  Constitutional: healthy, alert and no distress  Cardiovascular:RRR. No murmurs, clicks gallops or rub  Respiratory:  normal respiratory rate and rhythm, lungs clear to auscultation. No wheezes or crackles.  Abdomen: +BS, soft, mild epigastric tenderness to deep palpation, nondistended. No HSM.  Extremities: No C/C/E in BLE. No CVA tenderness.  Psychiatric: mentation appears normal and affect normal/bright        Results for orders placed or performed in visit on 09/18/18   Urinalysis, Micro If ([facility])   Result Value Ref Range    Specific Gravity Urine 1.020 1.005 - 1.030    pH Urine 5.0 4.5 - 8.0    Leukocyte Esterase UR 3+ (A) NEGATIVE    Nitrite Urine Positive (A) NEGATIVE    Protein UR 1+ (A) NEGATIVE    Glucose Urine Negative NEGATIVE    Ketones Urine Negative NEGATIVE    Urobilinogen mg/dL 1.0 E.U./dL (A) 0.2 E.U./dL    Bilirubin UR Negative NEGATIVE    Blood UR 3+ (A) NEGATIVE   Urine Microscopic ([facility])   Result Value Ref Range    WBC Urine 10-25 <5 /hpf    RBC Urine 10-25 <5 /hpf     Epithelial Cells UR 2-5 0 - 2 /lpf    Mucous Urine None NONE lpf    Casts Urine None NONE /lpf    Crystal Urine None NONE /lpf    Bacteria Wet Prep Few None       Assessment and Plan     Srikanth was seen today for kidney problem.    Diagnoses and all orders for this visit:    Gross hematuria  Acute cystitis with hematuria: Treating UTI with cipro for 14 days.  Urine culture pending.  Discussed that history of prostatitis may mean we need to treat longer.  Discussed how BPH could predispose to UTI if bladder not emptying well.  -     Urinalysis, Micro If ([facility])  -     Urine Microscopic ([facility])  -     Urine Culture ([facility])  -     ciprofloxacin (CIPRO) 500 MG tablet; Take 1 tablet (500 mg) by mouth 2 times daily    Benign prostatic hyperplasia without lower urinary tract symptoms: Restart terazosin per urology recs 9 months ago.  Discussed possible low BP side effects but BP a little high today so should be okay.     -     terazosin (HYTRIN) 2 MG capsule; Take 1 capsule (2 mg) by mouth At Bedtime    Will need continuity of care to further look into the chronic right side and chronic pelvic pain. Hopefully will improve with antibiotics.    Patient Instructions   You definitely have a bladder infection.  You may also have a prostatitis causing the testicle pain and side pain.    Take ciprofloxacin (antibiotic) one pill twice a day for 14 days.    Restart terazosin 2 mg at bedtime.  Watch for light headness or dizziness.    Return in about 2 weeks (around 10/2/2018) for BP Recheck, pelvic pain, urine infection.     Needs monthly visits for awhile to work through many chronic conditions.     Options for treatment and/or follow-up care were reviewed with the patient. Srikanth Graves was engaged and actively involved in the decision making process. He verbalized understanding of the options discussed and was satisfied with the final plan.    Alba Angelo MD

## 2018-09-18 NOTE — PATIENT INSTRUCTIONS
You definitely have a bladder infection.  You may also have a prostatitis causing the testicle pain and side pain.    Take ciprofloxacin (antibiotic) one pill twice a day for 14 days.    Restart terazosin 2 mg at bedtime.  Watch for light headness or dizziness.

## 2018-09-20 LAB — CULTURE: ABNORMAL

## 2018-09-21 ENCOUNTER — OFFICE VISIT - HEALTHEAST (OUTPATIENT)
Dept: PHYSICAL THERAPY | Facility: REHABILITATION | Age: 73
End: 2018-09-21

## 2018-09-21 DIAGNOSIS — M54.2 CERVICALGIA: ICD-10-CM

## 2018-09-21 DIAGNOSIS — G89.29 CHRONIC INTRACTABLE HEADACHE, UNSPECIFIED HEADACHE TYPE: ICD-10-CM

## 2018-09-21 DIAGNOSIS — R29.3 POOR POSTURE: ICD-10-CM

## 2018-09-21 DIAGNOSIS — M53.84 DECREASED ROM OF THORACIC SPINE: ICD-10-CM

## 2018-09-21 DIAGNOSIS — M53.82 WEAKNESS OF NECK: ICD-10-CM

## 2018-09-21 DIAGNOSIS — R07.89 CHEST WALL PAIN, CHRONIC: ICD-10-CM

## 2018-09-21 DIAGNOSIS — R51.9 CHRONIC INTRACTABLE HEADACHE, UNSPECIFIED HEADACHE TYPE: ICD-10-CM

## 2018-09-21 DIAGNOSIS — M94.0 COSTOCHONDRITIS: ICD-10-CM

## 2018-09-21 DIAGNOSIS — G89.29 CHEST WALL PAIN, CHRONIC: ICD-10-CM

## 2018-09-21 DIAGNOSIS — M62.81 MUSCLE WEAKNESS (GENERALIZED): ICD-10-CM

## 2018-09-24 ENCOUNTER — OFFICE VISIT (OUTPATIENT)
Dept: FAMILY MEDICINE | Facility: CLINIC | Age: 73
End: 2018-09-24
Payer: COMMERCIAL

## 2018-09-24 VITALS
BODY MASS INDEX: 33.07 KG/M2 | HEART RATE: 61 BPM | OXYGEN SATURATION: 96 % | TEMPERATURE: 98.6 F | WEIGHT: 189 LBS | SYSTOLIC BLOOD PRESSURE: 150 MMHG | RESPIRATION RATE: 20 BRPM | DIASTOLIC BLOOD PRESSURE: 79 MMHG

## 2018-09-24 DIAGNOSIS — M25.551 HIP PAIN, RIGHT: Primary | ICD-10-CM

## 2018-09-24 DIAGNOSIS — N44.2 TESTICULAR CYST: ICD-10-CM

## 2018-09-24 NOTE — MR AVS SNAPSHOT
After Visit Summary   9/24/2018    Srikanth Graves    MRN: [id number]           Patient Information     Date Of Birth          1945        Visit Information        Provider Department      9/24/2018 1:50 PM Rohit Pastor MD [facility]        Today's Diagnoses     Hip pain, right    -  1    Testicular cyst          Care Instructions    CONCLUSION:  1.  No bowel obstruction. Likely no acute appendicitis. Mild wall thickening of the terminal ileum is likely incidental.  2.  Normal kidneys. No hydronephrosis or hydroureter. No ureteral calculi.  3.  Infrarenal abdominal aortic aneurysm, unchanged. Coronary artery calcification.  4.  Scarring and bronchiectasis in the included lungs.  5.  Ill-defined low-attenuation of the pancreatic head and neck, unchanged in appearance and nonspecific. This would be better assessed with MRI.   Result Narrative   CT ABDOMEN PELVIS WO ORAL WO IV CONTRAST  1/22/2018 3:35 PM    INDICATION: RLQ pain.  TECHNIQUE: Routine CT abdomen and pelvis without oral or IV contrast. Multiplanar reformation images (MPR). Dose reduction techniques were used.  COMPARISON: 3/29/2017    FINDINGS:  LUNG BASES: There is mild scarring at the lung bases and mild bronchiectasis. Atherosclerotic disease of the aorta and coronary vessels is noted.    ABDOMEN: Normal spleen, adrenal glands, and gallbladder. A few circumscribed hypoattenuating hepatic lesions are unchanged though incompletely assessed. There is ill-defined low-attenuation at the pancreatic head and neck, which has not changed in   appearance. The kidneys are normal in size without hydronephrosis or hydroureter. No definite ureteral calculi identified. Normal appearance of the stomach. Normal caliber of the small bowel. Mild wall thickening of the terminal ileum is nonspecific.   There is an unchanged infrarenal abdominal aortic aneurysm with a maximum AP diameter of 3.2 cm.    PELVIS: Distended urinary bladder. Mild  enlargement of the prostate gland is noted. The seminal vesicles are symmetric. The colon is normal in caliber. The appendix is at the upper limits normal in size in diameter though contains normal air locules and   there is no periappendiceal stranding.    MUSCULOSKELETAL: Left femur ORIF. Degenerative osseous changes are seen at L5-S1.    I am sending the CAT scan results for abdomen, pelvis and genitalia/testicular are: all are normal            Follow-ups after your visit        Who to contact     Please call your clinic at [phone] to:    Ask questions about your health    Make or cancel appointments    Discuss your medicines    Learn about your test results    Speak to your doctor            Additional Information About Your Visit        FIGShart Information     [employer] is an electronic gateway that provides easy, online access to your medical records. With [employer], you can request a clinic appointment, read your test results, renew a prescription or communicate with your care team.     To sign up for [employer] visit the website at [email or web address]   You will be asked to enter the access code listed below, as well as some personal information. Please follow the directions to create your username and password.     Your access code is: [email or web address]  Expires: 2018  8:49 AM     Your access code will  in 90 days. If you need help or a new code, please contact your [facility] or call [phone] for assistance.        Care EveryWhere ID     This is your Care EveryWhere ID. This could be used by other organizations to access your [place] medical records  [id number]        Your Vitals Were     Pulse Temperature Respirations Pulse Oximetry BMI (Body Mass Index)       61 98.6  F (37  C) (Oral) 20 96% 33.07 kg/m2        Blood Pressure from Last 3 Encounters:   18 150/79   18 140/72   18 173/86    Weight from Last 3 Encounters:   18 189 lb  (85.7 kg)   09/18/18 186 lb 9.6 oz (84.6 kg)   09/17/18 185 lb 6.4 oz (84.1 kg)              Today, you had the following     No orders found for display       Primary Care Provider Office Phone # Fax #    Rohit Pastor [phone]       [address] [place] [address]        Equal Access to Services     KARLEE FLOWERS : Hadii aad ku hadasho Soomaali, waaxda luqadaha, qaybta kaalmada adeegyada, waxay idiin hayaan adeeg diana laAlexaan . So [facility] [phone].    ATENCIÓN: Si habla miki, tiene a andres disposición servicios gratuitos de asistencia lingüística. [facility] [phone].    We comply with applicable federal civil rights laws and [place] laws. We do not discriminate on the basis of race, color, national origin, age, disability, sex, sexual orientation, or gender identity.            Thank you!     Thank you for choosing [facility]  for your care. Our goal is always to provide you with excellent care. Hearing back from our patients is one way we can continue to improve our services. Please take a few minutes to complete the written survey that you may receive in the mail after your visit with us. Thank you!             Your Updated Medication List - Protect others around you: Learn how to safely use, store and throw away your medicines at www.disposemymeds.org.          This list is accurate as of 9/24/18  2:26 PM.  Always use your most recent med list.                   Brand Name Dispense Instructions for use Diagnosis    acetaminophen 500 MG tablet    TYLENOL    180 tablet    Take 2 tablets (1,000 mg) by mouth 3 times daily    Chronic low back pain, unspecified back pain laterality, with sciatica presence unspecified       albuterol 108 (90 Base) MCG/ACT inhaler    PROAIR HFA/PROVENTIL HFA/VENTOLIN HFA    3 Inhaler    INHALE TWO PUFFS BY MOUTH FOUR TIMES A DAY AS NEEDED    Chronic obstructive pulmonary disease, unspecified COPD type (H)       aspirin 81 MG tablet     90 tablet    Take  1 tablet (81 mg) by mouth daily    Coronary artery disease of native artery of native heart with stable angina pectoris (H)       atorvastatin 20 MG tablet    LIPITOR    90 tablet    Take 1 tablet (20 mg) by mouth daily    Coronary artery disease of native artery of native heart with stable angina pectoris (H)       Capsaicin 0.1 % cream    CAPSAICIN HP    42.5 g    Apply topically 3 times daily as needed (pain)    Flank pain       ciprofloxacin 500 MG tablet    CIPRO    28 tablet    Take 1 tablet (500 mg) by mouth 2 times daily    Gross hematuria, Acute cystitis with hematuria       clopidogrel 75 MG tablet    PLAVIX    90 tablet    Take 1 tablet (75 mg) by mouth daily    Coronary artery disease of native artery of native heart with stable angina pectoris (H)       docusate sodium 100 MG tablet    COLACE    60 tablet    Take 100 mg by mouth 2 times daily as needed for constipation    Other constipation       dorzolamide-timolol 2-0.5 % ophthalmic solution    COSOPT    1 Bottle    PLACE 1 DROP IN THE RIGHT EYE ONCE DAILY    Primary open angle glaucoma of right eye, mild stage       fluticasone 50 MCG/ACT spray    FLONASE    1 Bottle    Spray 2 sprays into both nostrils daily    Post-nasal drip       furosemide 20 MG tablet    LASIX    90 tablet    Take 1 tablet (20 mg) by mouth daily    Essential hypertension       HYDROCERIN Crea     180 g    Apply topically 3 times daily as needed    Dry skin       isosorbide mononitrate 60 MG 24 hr tablet    IMDUR    30 tablet    Take 1 tablet (60 mg) by mouth daily    Coronary artery disease of native artery of native heart with stable angina pectoris (H), Essential hypertension       lidocaine 5 % ointment    XYLOCAINE    30 g    Apply to affected area three times a day as needed    Chronic low back pain, unspecified back pain laterality, with sciatica presence unspecified       lisinopril 40 MG tablet    PRINIVIL/ZESTRIL    90 tablet    Take 1 tablet (40 mg) by mouth daily     Essential hypertension       metoprolol tartrate 100 MG tablet    LOPRESSOR    90 tablet    Take 0.5 tablets (50 mg) by mouth 2 times daily    Essential hypertension       mometasone-formoterol 200-5 MCG/ACT oral inhaler    DULERA    3 Inhaler    INHALE TWO PUFFS BY MOUTH TWICE A DAY    Chronic obstructive pulmonary disease, unspecified COPD type (H)       nitroGLYcerin 0.4 MG/SPRAY spray    NITROLINGUAL     DISSOLVE 1 SPRAY UNDER TONGUE EVERY 5 MINUTES AS NEEDED FOR CHEST PAIN        order for DME     1 Units    TENS unit    Chronic bilateral low back pain without sciatica       order for DME     1 Units    Equipment being ordered: Home Blood pressure monitor    Essential hypertension       order for DME     1 Device    Equipment being ordered: TENS    Cervicalgia       polyvinyl alcohol 1.4 % ophthalmic solution    LIQUIFILM TEARS    15 mL    Place 1 drop into the right eye as needed for dry eyes    Dry eyes       ranitidine 150 MG tablet    ZANTAC    90 tablet    Take 1 tablet (150 mg) by mouth daily    Gastroesophageal reflux disease, esophagitis presence not specified       ranolazine 500 MG 12 hr tablet    RANEXA     Take 500 mg by mouth daily        rosuvastatin 10 MG tablet    CRESTOR    90 tablet    Take 1 tablet (10 mg) by mouth daily    Coronary artery disease of native artery of native heart with stable angina pectoris (H)       sertraline 50 MG tablet    ZOLOFT    90 tablet    Take 1 tablet (50 mg) by mouth daily    Depression, unspecified depression type       sodium chloride 0.65 % nasal spray    OCEAN    3 Bottle    Spray in each nostril 3-4 times daily as needed    Preventive measure       TAB-A-MARZENA Tabs     90 tablet    Take 1 tablet by mouth daily    Routine adult health maintenance       terazosin 2 MG capsule    HYTRIN    30 capsule    Take 1 capsule (2 mg) by mouth At Bedtime    Benign prostatic hyperplasia without lower urinary tract symptoms       tiotropium 18 MCG capsule    SPIRIVA    90  capsule    Inhale 1 capsule (18 mcg) into the lungs daily    Chronic obstructive pulmonary disease, unspecified COPD type (H)       traZODone 50 MG tablet    DESYREL    180 tablet    Take 1/2 to 2 tablets by mouth at bedtime as needed for sleep.    Insomnia, unspecified type       vitamin D 2000 units tablet     100 tablet    Take 2,000 Units by mouth daily    Preventive measure

## 2018-09-24 NOTE — PROGRESS NOTES
S: Srikanth Graves is a 73 year old male who returns for follow up of  R hip/official x ray are back  Complains of right  testicular discomfort/ seen by urology recently and had normal CT pelvis and abdomen, mild pain which is  intermittent  Patients states that main concern today is  Right hip follow up    PMHX/PSHX/MEDS/ALLERGIES/SHX/FHX reviewed and updated in Epic.  See problem list and s/p right testicular cyst removal in remote past    ROS:  General: No fevers, chills  Head: No headache  Ears: No acute change in hearing.    CV: No chest pain or palpitations.  Resp: No shortness of breath.  No cough. No hemoptysis.  GI: No nausea, vomiting, constipation, diarrhea  : No urinary pains    O: /79  Pulse 61  Temp 98.6  F (37  C) (Oral)  Resp 20  Wt 189 lb (85.7 kg)  SpO2 96%  BMI 33.07 kg/m2   Gen:  Well nourished and in NAD    CV:  RRR  - no murmurs, rubs, or gallups,   Pulm:  CTAB, no wheezes/rales/rhonchi, good air entry   ABD: soft, nontender, no masses, no rebound, BS intact throughout  Extrem: no cyanosis, edema or clubbing  Psych: Euthymic    Genitalia: declines  No diagnosis found.  1  R hip pain/stable official x ray are all ok Mild to moderate DJD clinically   activity as tolerated, physical therapy   Fu s needed  2 Testicular discomfort : follow-up as needed and urology  RTC routine medical care or sooner if develops new or worsening symptoms.    Rohit Pastor

## 2018-09-24 NOTE — PATIENT INSTRUCTIONS
CONCLUSION:  1.  No bowel obstruction. Likely no acute appendicitis. Mild wall thickening of the terminal ileum is likely incidental.  2.  Normal kidneys. No hydronephrosis or hydroureter. No ureteral calculi.  3.  Infrarenal abdominal aortic aneurysm, unchanged. Coronary artery calcification.  4.  Scarring and bronchiectasis in the included lungs.  5.  Ill-defined low-attenuation of the pancreatic head and neck, unchanged in appearance and nonspecific. This would be better assessed with MRI.   Result Narrative   CT ABDOMEN PELVIS WO ORAL WO IV CONTRAST  1/22/2018 3:35 PM    INDICATION: RLQ pain.  TECHNIQUE: Routine CT abdomen and pelvis without oral or IV contrast. Multiplanar reformation images (MPR). Dose reduction techniques were used.  COMPARISON: 3/29/2017    FINDINGS:  LUNG BASES: There is mild scarring at the lung bases and mild bronchiectasis. Atherosclerotic disease of the aorta and coronary vessels is noted.    ABDOMEN: Normal spleen, adrenal glands, and gallbladder. A few circumscribed hypoattenuating hepatic lesions are unchanged though incompletely assessed. There is ill-defined low-attenuation at the pancreatic head and neck, which has not changed in   appearance. The kidneys are normal in size without hydronephrosis or hydroureter. No definite ureteral calculi identified. Normal appearance of the stomach. Normal caliber of the small bowel. Mild wall thickening of the terminal ileum is nonspecific.   There is an unchanged infrarenal abdominal aortic aneurysm with a maximum AP diameter of 3.2 cm.    PELVIS: Distended urinary bladder. Mild enlargement of the prostate gland is noted. The seminal vesicles are symmetric. The colon is normal in caliber. The appendix is at the upper limits normal in size in diameter though contains normal air locules and   there is no periappendiceal stranding.    MUSCULOSKELETAL: Left femur ORIF. Degenerative osseous changes are seen at L5-S1.    I am sending the CAT scan  results for abdomen, pelvis and genitalia/testicular are: all are normal

## 2018-10-11 DIAGNOSIS — M54.5 CHRONIC LOW BACK PAIN, UNSPECIFIED BACK PAIN LATERALITY, WITH SCIATICA PRESENCE UNSPECIFIED: ICD-10-CM

## 2018-10-11 DIAGNOSIS — G89.29 CHRONIC LOW BACK PAIN, UNSPECIFIED BACK PAIN LATERALITY, WITH SCIATICA PRESENCE UNSPECIFIED: ICD-10-CM

## 2018-10-11 RX ORDER — LIDOCAINE 50 MG/G
OINTMENT TOPICAL
Qty: 30 G | Refills: 3 | Status: SHIPPED | OUTPATIENT
Start: 2018-10-11 | End: 2019-01-30

## 2018-10-22 ENCOUNTER — ALLIED HEALTH/NURSE VISIT (OUTPATIENT)
Dept: FAMILY MEDICINE | Facility: CLINIC | Age: 73
End: 2018-10-22
Payer: MEDICARE

## 2018-10-22 DIAGNOSIS — I25.118 CORONARY ARTERY DISEASE OF NATIVE ARTERY OF NATIVE HEART WITH STABLE ANGINA PECTORIS (H): Primary | ICD-10-CM

## 2018-10-22 RX ORDER — ISOSORBIDE MONONITRATE 120 MG/1
120 TABLET, EXTENDED RELEASE ORAL DAILY
Qty: 90 TABLET | Refills: 3 | Status: SHIPPED | OUTPATIENT
Start: 2018-10-22 | End: 2019-01-10

## 2018-10-22 NOTE — MR AVS SNAPSHOT
After Visit Summary   10/22/2018    Srikanth Graves    MRN: [id number]           Patient Information     Date Of Birth          1945        Visit Information        Provider Department      10/22/2018 9:00 AM [facility]        Today's Diagnoses     Health education    -  1       Follow-ups after your visit        Your next 10 appointments already scheduled     Oct 24, 2018 11:00 AM CDT   Return Visit with Rohit Pastor MD   [facility])    [address] [place] [id number]   [phone]              Who to contact     Please call your clinic at [phone] to:    Ask questions about your health    Make or cancel appointments    Discuss your medicines    Learn about your test results    Speak to your doctor            Additional Information About Your Visit        MyChart Information     [employer] is an electronic gateway that provides easy, online access to your medical records. With [employer], you can request a clinic appointment, read your test results, renew a prescription or communicate with your care team.     To sign up for [employer] visit the website at [email or web address]   You will be asked to enter the access code listed below, as well as some personal information. Please follow the directions to create your username and password.     Your access code is: [email or web address]  Expires: 2018  8:49 AM     Your access code will  in 90 days. If you need help or a new code, please contact your [facility] or call [phone] for assistance.        Care EveryWhere ID     This is your Care EveryWhere ID. This could be used by other organizations to access your [place] medical records  [id number]         Blood Pressure from Last 3 Encounters:   18 150/79   18 140/72   18 173/86    Weight from Last 3 Encounters:   18 189 lb (85.7 kg)   18 186 lb 9.6 oz (84.6 kg)   18 185 lb 6.4  oz (84.1 kg)              Today, you had the following     No orders found for display       Primary Care Provider Office Phone # Fax #    Rohit Pastor [phone]       [address] [place] [address]        Equal Access to Services     STEPHANIE FLOWERS : Hadii lily ku hadrissao Soomaali, waaxda luqadaha, qaybta kaalmada adeegyada, evens michaelin hayaan tabithaivy ortiz wei . So [facility] [phone].    ATENCIÓN: Si habla español, tiene a andres disposición servicios gratuitos de asistencia lingüística. Llame al [phone].    We comply with applicable federal civil rights laws and [place] laws. We do not discriminate on the basis of race, color, national origin, age, disability, sex, sexual orientation, or gender identity.            Thank you!     Thank you for choosing [facility]  for your care. Our goal is always to provide you with excellent care. Hearing back from our patients is one way we can continue to improve our services. Please take a few minutes to complete the written survey that you may receive in the mail after your visit with us. Thank you!             Your Updated Medication List - Protect others around you: Learn how to safely use, store and throw away your medicines at www.disposemymeds.org.          This list is accurate as of 10/22/18  9:26 AM.  Always use your most recent med list.                   Brand Name Dispense Instructions for use Diagnosis    acetaminophen 500 MG tablet    TYLENOL    180 tablet    Take 2 tablets (1,000 mg) by mouth 3 times daily    Chronic low back pain, unspecified back pain laterality, with sciatica presence unspecified       albuterol 108 (90 Base) MCG/ACT inhaler    PROAIR HFA/PROVENTIL HFA/VENTOLIN HFA    3 Inhaler    INHALE TWO PUFFS BY MOUTH FOUR TIMES A DAY AS NEEDED    Chronic obstructive pulmonary disease, unspecified COPD type (H)       aspirin 81 MG tablet     90 tablet    Take 1 tablet (81 mg) by mouth daily    Coronary artery disease of  native artery of native heart with stable angina pectoris (H)       atorvastatin 20 MG tablet    LIPITOR    90 tablet    Take 1 tablet (20 mg) by mouth daily    Coronary artery disease of native artery of native heart with stable angina pectoris (H)       Capsaicin 0.1 % cream    CAPSAICIN HP    42.5 g    Apply topically 3 times daily as needed (pain)    Flank pain       ciprofloxacin 500 MG tablet    CIPRO    28 tablet    Take 1 tablet (500 mg) by mouth 2 times daily    Gross hematuria, Acute cystitis with hematuria       clopidogrel 75 MG tablet    PLAVIX    90 tablet    Take 1 tablet (75 mg) by mouth daily    Coronary artery disease of native artery of native heart with stable angina pectoris (H)       docusate sodium 100 MG tablet    COLACE    60 tablet    Take 100 mg by mouth 2 times daily as needed for constipation    Other constipation       dorzolamide-timolol 2-0.5 % ophthalmic solution    COSOPT    1 Bottle    PLACE 1 DROP IN THE RIGHT EYE ONCE DAILY    Primary open angle glaucoma of right eye, mild stage       fluticasone 50 MCG/ACT spray    FLONASE    1 Bottle    Spray 2 sprays into both nostrils daily    Post-nasal drip       furosemide 20 MG tablet    LASIX    90 tablet    Take 1 tablet (20 mg) by mouth daily    Essential hypertension       HYDROCERIN Crea     180 g    Apply topically 3 times daily as needed    Dry skin       isosorbide mononitrate 60 MG 24 hr tablet    IMDUR    30 tablet    Take 1 tablet (60 mg) by mouth daily    Coronary artery disease of native artery of native heart with stable angina pectoris (H), Essential hypertension       lidocaine 5 % ointment    XYLOCAINE    30 g    Apply to affected area three times a day as needed    Chronic low back pain, unspecified back pain laterality, with sciatica presence unspecified       lisinopril 40 MG tablet    PRINIVIL/ZESTRIL    90 tablet    Take 1 tablet (40 mg) by mouth daily    Essential hypertension       metoprolol tartrate 100 MG tablet     LOPRESSOR    90 tablet    Take 0.5 tablets (50 mg) by mouth 2 times daily    Essential hypertension       mometasone-formoterol 200-5 MCG/ACT oral inhaler    DULERA    3 Inhaler    INHALE TWO PUFFS BY MOUTH TWICE A DAY    Chronic obstructive pulmonary disease, unspecified COPD type (H)       nitroGLYcerin 0.4 MG/SPRAY spray    NITROLINGUAL     DISSOLVE 1 SPRAY UNDER TONGUE EVERY 5 MINUTES AS NEEDED FOR CHEST PAIN        order for DME     1 Units    TENS unit    Chronic bilateral low back pain without sciatica       order for DME     1 Units    Equipment being ordered: Home Blood pressure monitor    Essential hypertension       order for DME     1 Device    Equipment being ordered: TENS    Cervicalgia       polyvinyl alcohol 1.4 % ophthalmic solution    LIQUIFILM TEARS    15 mL    Place 1 drop into the right eye as needed for dry eyes    Dry eyes       ranitidine 150 MG tablet    ZANTAC    90 tablet    Take 1 tablet (150 mg) by mouth daily    Gastroesophageal reflux disease, esophagitis presence not specified       ranolazine 500 MG 12 hr tablet    RANEXA     Take 500 mg by mouth daily        rosuvastatin 10 MG tablet    CRESTOR    90 tablet    Take 1 tablet (10 mg) by mouth daily    Coronary artery disease of native artery of native heart with stable angina pectoris (H)       sertraline 50 MG tablet    ZOLOFT    90 tablet    Take 1 tablet (50 mg) by mouth daily    Depression, unspecified depression type       sodium chloride 0.65 % nasal spray    OCEAN    3 Bottle    Spray in each nostril 3-4 times daily as needed    Preventive measure       TAB-A-MARZENA Tabs     90 tablet    Take 1 tablet by mouth daily    Routine adult health maintenance       terazosin 2 MG capsule    HYTRIN    30 capsule    Take 1 capsule (2 mg) by mouth At Bedtime    Benign prostatic hyperplasia without lower urinary tract symptoms       tiotropium 18 MCG capsule    SPIRIVA    90 capsule    Inhale 1 capsule (18 mcg) into the lungs daily     Chronic obstructive pulmonary disease, unspecified COPD type (H)       traZODone 50 MG tablet    DESYREL    180 tablet    Take 1/2 to 2 tablets by mouth at bedtime as needed for sleep.    Insomnia, unspecified type       vitamin D 2000 units tablet     100 tablet    Take 2,000 Units by mouth daily    Preventive measure

## 2018-10-22 NOTE — PROGRESS NOTES
PharmD did med rec with patient on 8/31 and increased imdur to 60mg.  It appears that [facility] was filling Imdur ER 30mg tabs take four tablets daily (120mg daily).  This is what their most recent note and 5/2018 note also alludes to.   I think that this is the dose he should actually be on. I am sending 120mg tablet so that he only has to take one a day instead of 4.  Please call the patient and educate him on this change.  Alba Angelo MD  Routed to RN.

## 2018-10-22 NOTE — NURSING NOTE
Met with patient who stated that his pharmacy was having a questions around what isosorbide dosage he should be taking as they had both the 30 mg and the 60 mg dosage noted on their med req form.  Reviewed Notes and medication list and noted that isosorbide 30 mg had been discontinued on 8/31/18 and 60 mg was ordered by Dr. Angelo on 8/31/18.  Update [address] on [address] with updated dose for isosorbide.      Routed to Dr. Angelo/LALA Murphy RN

## 2018-10-23 ENCOUNTER — TELEPHONE (OUTPATIENT)
Dept: FAMILY MEDICINE | Facility: CLINIC | Age: 73
End: 2018-10-23

## 2018-10-23 DIAGNOSIS — I25.118 CORONARY ARTERY DISEASE OF NATIVE ARTERY OF NATIVE HEART WITH STABLE ANGINA PECTORIS (H): ICD-10-CM

## 2018-10-23 NOTE — TELEPHONE ENCOUNTER
"Spoke with patient and gave him the instructions that he is to be taking 120 mg of Imdur daily instead of the 60mg per Dr. Angelo message of : \"PharmD did med rec with patient on 8/31 and increased imdur to 60mg.  It appears that [facility] cardiology was filling Imdur ER 30mg tabs take four tablets daily (120mg daily).  This is what their most recent note and 5/2018 note also alludes to.   I think that this is the dose he should actually be on. I am sending 120mg tablet so that he only has to take one a day instead of 4.\"      Explained to him that the prescription was sent to his pharmacy so that he should be only taking 1 pill (that is 120mg) every day.  Patient repeated back to me the instructions and said that he understood them.    Pt verbalizes understanding of the directions and information. Gave pt opportunity to ask additional questions or address concerns. Pt is directed to call back if he has any further questions or concerns.    Routed to Dr. Angelo/LALA Murphy RN    "

## 2018-10-24 ENCOUNTER — OFFICE VISIT (OUTPATIENT)
Dept: FAMILY MEDICINE | Facility: CLINIC | Age: 73
End: 2018-10-24
Payer: MEDICARE

## 2018-10-24 VITALS
TEMPERATURE: 98.8 F | OXYGEN SATURATION: 97 % | BODY MASS INDEX: 32.93 KG/M2 | RESPIRATION RATE: 16 BRPM | HEART RATE: 63 BPM | DIASTOLIC BLOOD PRESSURE: 83 MMHG | SYSTOLIC BLOOD PRESSURE: 165 MMHG | WEIGHT: 188.2 LBS

## 2018-10-24 DIAGNOSIS — J40 BRONCHITIS: Primary | ICD-10-CM

## 2018-10-24 RX ORDER — AMOXICILLIN AND CLAVULANATE POTASSIUM 500; 125 MG/1; MG/1
1 TABLET, FILM COATED ORAL 2 TIMES DAILY
Qty: 14 TABLET | Refills: 0 | Status: SHIPPED | OUTPATIENT
Start: 2018-10-24 | End: 2018-10-24 | Stop reason: ALTCHOICE

## 2018-10-24 RX ORDER — AMOXICILLIN 500 MG/1
500 CAPSULE ORAL 2 TIMES DAILY
Qty: 14 CAPSULE | Refills: 0 | Status: SHIPPED | OUTPATIENT
Start: 2018-10-24 | End: 2019-01-16

## 2018-10-24 NOTE — MR AVS SNAPSHOT
After Visit Summary   10/24/2018    Srikanth Graves    MRN: [id number]           Patient Information     Date Of Birth          1945        Visit Information        Provider Department      10/24/2018 11:00 AM Rohti Pastor MD [facility]        Today's Diagnoses     Bronchitis    -  1      Care Instructions     bronchitis   your 4 day of azithromycin    As requested  I gave 7 more days of antibiotics/amoxicillin            Follow-ups after your visit        Who to contact     Please call your clinic at [phone] to:    Ask questions about your health    Make or cancel appointments    Discuss your medicines    Learn about your test results    Speak to your doctor            Additional Information About Your Visit        MyChart Information     [employer] Discounter[employer] is an electronic gateway that provides easy, online access to your medical records. With InfluAds, you can request a clinic appointment, read your test results, renew a prescription or communicate with your care team.     To sign up for [employer]ounter[employer] visit the website at [email or web address]   You will be asked to enter the access code listed below, as well as some personal information. Please follow the directions to create your username and password.     Your access code is: [email or web address]  Expires: 2018  8:49 AM     Your access code will  in 90 days. If you need help or a new code, please contact your [facility] or call [phone] for assistance.        Care EveryWhere ID     This is your Care EveryWhere ID. This could be used by other organizations to access your [facility] medical records  [id number]        Your Vitals Were     Pulse Temperature Respirations Pulse Oximetry BMI (Body Mass Index)       63 98.8  F (37.1  C) (Oral) 16 97% 32.93 kg/m2        Blood Pressure from Last 3 Encounters:   10/24/18 165/83   18 150/79   18 140/72    Weight from Last 3 Encounters:   10/24/18 188 lb  3.2 oz (85.4 kg)   09/24/18 189 lb (85.7 kg)   09/18/18 186 lb 9.6 oz (84.6 kg)              Today, you had the following     No orders found for display         Today's Medication Changes          These changes are accurate as of 10/24/18 11:53 AM.  If you have any questions, ask your nurse or doctor.               Start taking these medicines.        Dose/Directions    amoxicillin 500 MG capsule   Commonly known as:  AMOXIL   Used for:  Bronchitis   Started by:  Rohit Pastor MD        Dose:  500 mg   Take 1 capsule (500 mg) by mouth 2 times daily   Quantity:  14 capsule   Refills:  0            Where to get your medicines      These medications were sent to [facility] - [place], [place] - [address], [place] [address]     Phone:  [phone]     amoxicillin 500 MG capsule                Primary Care Provider Office Phone # Fax #    Rohit Pastor [phone]       [address] [place] [address]        Equal Access to Services     KARLEE FLOWERS AH: Hadii lily ku hadasho Soomaali, waaxda luqadaha, qaybta kaalmada adeegyada, waxay kale carvajal . So [facility] [phone].    ATENCIÓN: Si habla español, tiene a andres disposición servicios gratuitos de asistencia lingüística. Ll[facility] [phone].    We comply with applicable federal civil rights laws and [place] laws. We do not discriminate on the basis of race, color, national origin, age, disability, sex, sexual orientation, or gender identity.            Thank you!     Thank you for choosing [facility]  for your care. Our goal is always to provide you with excellent care. Hearing back from our patients is one way we can continue to improve our services. Please take a few minutes to complete the written survey that you may receive in the mail after your visit with us. Thank you!             Your Updated Medication List - Protect others around you: Learn how to safely use, store and throw away  your medicines at www.disposemymeds.org.          This list is accurate as of 10/24/18 11:53 AM.  Always use your most recent med list.                   Brand Name Dispense Instructions for use Diagnosis    acetaminophen 500 MG tablet    TYLENOL    180 tablet    Take 2 tablets (1,000 mg) by mouth 3 times daily    Chronic low back pain, unspecified back pain laterality, with sciatica presence unspecified       albuterol 108 (90 Base) MCG/ACT inhaler    PROAIR HFA/PROVENTIL HFA/VENTOLIN HFA    3 Inhaler    INHALE TWO PUFFS BY MOUTH FOUR TIMES A DAY AS NEEDED    Chronic obstructive pulmonary disease, unspecified COPD type (H)       amoxicillin 500 MG capsule    AMOXIL    14 capsule    Take 1 capsule (500 mg) by mouth 2 times daily    Bronchitis       aspirin 81 MG tablet     90 tablet    Take 1 tablet (81 mg) by mouth daily    Coronary artery disease of native artery of native heart with stable angina pectoris (H)       atorvastatin 20 MG tablet    LIPITOR    90 tablet    Take 1 tablet (20 mg) by mouth daily    Coronary artery disease of native artery of native heart with stable angina pectoris (H)       Capsaicin 0.1 % cream    CAPSAICIN HP    42.5 g    Apply topically 3 times daily as needed (pain)    Flank pain       ciprofloxacin 500 MG tablet    CIPRO    28 tablet    Take 1 tablet (500 mg) by mouth 2 times daily    Gross hematuria, Acute cystitis with hematuria       clopidogrel 75 MG tablet    PLAVIX    90 tablet    Take 1 tablet (75 mg) by mouth daily    Coronary artery disease of native artery of native heart with stable angina pectoris (H)       docusate sodium 100 MG tablet    COLACE    60 tablet    Take 100 mg by mouth 2 times daily as needed for constipation    Other constipation       dorzolamide-timolol 2-0.5 % ophthalmic solution    COSOPT    1 Bottle    PLACE 1 DROP IN THE RIGHT EYE ONCE DAILY    Primary open angle glaucoma of right eye, mild stage       fluticasone 50 MCG/ACT spray    FLONASE    1  Bottle    Spray 2 sprays into both nostrils daily    Post-nasal drip       furosemide 20 MG tablet    LASIX    90 tablet    Take 1 tablet (20 mg) by mouth daily    Essential hypertension       HYDROCERIN Crea     180 g    Apply topically 3 times daily as needed    Dry skin       * isosorbide mononitrate 60 MG 24 hr tablet    IMDUR    30 tablet    Take 1 tablet (60 mg) by mouth daily    Coronary artery disease of native artery of native heart with stable angina pectoris (H), Essential hypertension       * Isosorbide Mononitrate  MG Tb24     90 tablet    Take 1 tablet (120 mg) by mouth daily    Coronary artery disease of native artery of native heart with stable angina pectoris (H)       lidocaine 5 % ointment    XYLOCAINE    30 g    Apply to affected area three times a day as needed    Chronic low back pain, unspecified back pain laterality, with sciatica presence unspecified       lisinopril 40 MG tablet    PRINIVIL/ZESTRIL    90 tablet    Take 1 tablet (40 mg) by mouth daily    Essential hypertension       metoprolol tartrate 100 MG tablet    LOPRESSOR    90 tablet    Take 0.5 tablets (50 mg) by mouth 2 times daily    Essential hypertension       mometasone-formoterol 200-5 MCG/ACT oral inhaler    DULERA    3 Inhaler    INHALE TWO PUFFS BY MOUTH TWICE A DAY    Chronic obstructive pulmonary disease, unspecified COPD type (H)       nitroGLYcerin 0.4 MG/SPRAY spray    NITROLINGUAL     DISSOLVE 1 SPRAY UNDER TONGUE EVERY 5 MINUTES AS NEEDED FOR CHEST PAIN        order for DME     1 Units    TENS unit    Chronic bilateral low back pain without sciatica       order for DME     1 Units    Equipment being ordered: Home Blood pressure monitor    Essential hypertension       order for DME     1 Device    Equipment being ordered: TENS    Cervicalgia       polyvinyl alcohol 1.4 % ophthalmic solution    LIQUIFILM TEARS    15 mL    Place 1 drop into the right eye as needed for dry eyes    Dry eyes       ranitidine 150 MG  tablet    ZANTAC    90 tablet    Take 1 tablet (150 mg) by mouth daily    Gastroesophageal reflux disease, esophagitis presence not specified       ranolazine 500 MG 12 hr tablet    RANEXA     Take 500 mg by mouth daily        rosuvastatin 10 MG tablet    CRESTOR    90 tablet    Take 1 tablet (10 mg) by mouth daily    Coronary artery disease of native artery of native heart with stable angina pectoris (H)       sertraline 50 MG tablet    ZOLOFT    90 tablet    Take 1 tablet (50 mg) by mouth daily    Depression, unspecified depression type       sodium chloride 0.65 % nasal spray    OCEAN    3 Bottle    Spray in each nostril 3-4 times daily as needed    Preventive measure       TAB-A-MARZENA Tabs     90 tablet    Take 1 tablet by mouth daily    Routine adult health maintenance       terazosin 2 MG capsule    HYTRIN    30 capsule    Take 1 capsule (2 mg) by mouth At Bedtime    Benign prostatic hyperplasia without lower urinary tract symptoms       tiotropium 18 MCG capsule    SPIRIVA    90 capsule    Inhale 1 capsule (18 mcg) into the lungs daily    Chronic obstructive pulmonary disease, unspecified COPD type (H)       traZODone 50 MG tablet    DESYREL    180 tablet    Take 1/2 to 2 tablets by mouth at bedtime as needed for sleep.    Insomnia, unspecified type       vitamin D 2000 units tablet     100 tablet    Take 2,000 Units by mouth daily    Preventive measure       * Notice:  This list has 2 medication(s) that are the same as other medications prescribed for you. Read the directions carefully, and ask your doctor or other care provider to review them with you.

## 2018-10-24 NOTE — PROGRESS NOTES
S: Srikanth Graves is a 73 year old male who returns for follow up of  Ed/bronchitis; was given Z pack 5 days  ago, was told if continues with symptoms he will need more antibiotics   Complaints today: still productive  Green cough, no SOB, no fever  Patients states that main concern today is more antibiotics for bronchitis    PMHX/PSHX/MEDS/ALLERGIES/SHX/FHX reviewed and updated in Epic.      ROS:  General: No fevers, chills  Head: No headache  Ears: No acute change in hearing.    CV: No chest pain or palpitations.  Resp: No shortness of breath.  No cough. No hemoptysis.  GI: No nausea, vomiting, constipation, diarrhea  : No urinary pains    O: /83  Pulse 63  Temp 98.8  F (37.1  C) (Oral)  Resp 16  Wt 188 lb 3.2 oz (85.4 kg)  SpO2 97%  BMI 32.93 kg/m2   Gen:  Well nourished and in NAD  Mild productive cough  Neck: supple without lymphadenopathy  CV:  RRR  - no murmurs, rubs, or gallups,   Pulm:  CTAB, no wheezes/rales/rhonchi, good air entry   ABD: soft, nontender, no masses, no rebound, BS intact throughout  Extrem: no cyanosis, edema or clubbing  Psych: Euthymic      Bronchitis   I feel he is adequately treated wit Z pack, exam is normal  Because He has COPD and per request I will give Srikanth  One week of amoxicillin    RTC for regular care or sooner if develops new or worsening symptoms.    Rohit Pastor

## 2018-10-24 NOTE — PATIENT INSTRUCTIONS
bronchitis   your 4 day of azithromycin    As requested  I gave 7 more days of antibiotics/amoxicillin

## 2018-10-25 ENCOUNTER — ALLIED HEALTH/NURSE VISIT (OUTPATIENT)
Dept: FAMILY MEDICINE | Facility: CLINIC | Age: 73
End: 2018-10-25
Payer: MEDICARE

## 2018-10-25 DIAGNOSIS — Z71.9 ENCOUNTER FOR COUNSELING: Primary | ICD-10-CM

## 2018-10-25 NOTE — PATIENT INSTRUCTIONS
Instructed patient not to take the KCL 10 meq that he found at home.     Pt verbalizes understanding of the directions and information. Gave pt opportunity to ask additional questions or address concerns. Pt is directed to call back if he has any further questions or concerns.

## 2018-10-25 NOTE — NURSING NOTE
"Patient stated that he had found a bottle of Potassium chloride on the floor at home.  He came in as he wanted to make sure that he wasn't suppose to be taking it.  Patient brought in a full bottle of Potassium Chloride 10 meq with him.  Reviewed patients current medication list and noted that this was discontinued on 3/12/18 and that he currently has not Potassium chloride ordered on his current med list.    Patient asked that his pharmacy be called to ensure that they aren't sending him anymore as he has \"2 more bottles\" at home.  Instructed patient that he should not be taking those either.    Pt verbalizes understanding of the directions and information. Gave pt opportunity to ask additional questions or address concerns. Pt is directed to call back if he has any further questions or concerns.    Routed to Dr. Canseco/LALA Murphy RN      "

## 2018-10-25 NOTE — MR AVS SNAPSHOT
After Visit Summary   10/25/2018    Srikanth Graves    MRN: [id number]           Patient Information     Date Of Birth          1945        Visit Information        Provider Department      10/25/2018 9:45 AM Nurse, [facility]        Today's Diagnoses     ERRONEOUS ENCOUNTER--DISREGARD    -  1      Care Instructions    Instructed patient not to take the KCL 10 meq that he found at home.     Pt verbalizes understanding of the directions and information. Gave pt opportunity to ask additional questions or address concerns. Pt is directed to call back if he has any further questions or concerns.                Follow-ups after your visit        Your next 10 appointments already scheduled     Oct 25, 2018  9:45 AM CDT   Nurse Visit with [facility] Nurse   [facility]    [address] [place] [address]   [phone]              Who to contact     Please call your clinic at [phone] to:    Ask questions about your health    Make or cancel appointments    Discuss your medicines    Learn about your test results    Speak to your doctor            Additional Information About Your Visit        MyChart Information     [employer] is an electronic gateway that provides easy, online access to your medical records. With Realty[employer], you can request a clinic appointment, read your test results, renew a prescription or communicate with your care team.     To sign up for UannaBet visit the website at [email or web address]   You will be asked to enter the access code listed below, as well as some personal information. Please follow the directions to create your username and password.     Your access code is: [email or web address]  Expires: 2018  8:49 AM     Your access code will  in 90 days. If you need help or a new code, please contact your [facility] or call [phone] for assistance.        Care EveryWhere ID     This is your Care EveryWhere  ID. This could be used by other organizations to access your [facility] medical records  [id number]         Blood Pressure from Last 3 Encounters:   10/24/18 165/83   09/24/18 150/79   09/18/18 140/72    Weight from Last 3 Encounters:   10/24/18 188 lb 3.2 oz (85.4 kg)   09/24/18 189 lb (85.7 kg)   09/18/18 186 lb 9.6 oz (84.6 kg)              Today, you had the following     No orders found for display       Primary Care Provider Office Phone # Fax #    Rohit Pastor [phone]       [address] [place] [address]        Equal Access to Services     STEPHANIE FLOWERS : Daphney Sanabria, yeyo chaudhary, ambreen gann, evens salomon. So [facility] [phone].    ATENCIÓN: Si habla español, tiene a andres disposición servicios gratuitos de asistencia lingüística. Llame al [phone].    We comply with applicable federal civil rights laws and [place] laws. We do not discriminate on the basis of race, color, national origin, age, disability, sex, sexual orientation, or gender identity.            Thank you!     Thank you for choosing [facility]  for your care. Our goal is always to provide you with excellent care. Hearing back from our patients is one way we can continue to improve our services. Please take a few minutes to complete the written survey that you may receive in the mail after your visit with us. Thank you!             Your Updated Medication List - Protect others around you: Learn how to safely use, store and throw away your medicines at www.disposemymeds.org.          This list is accurate as of 10/25/18  9:28 AM.  Always use your most recent med list.                   Brand Name Dispense Instructions for use Diagnosis    acetaminophen 500 MG tablet    TYLENOL    180 tablet    Take 2 tablets (1,000 mg) by mouth 3 times daily    Chronic low back pain, unspecified back pain laterality, with sciatica presence unspecified       albuterol 108  (90 Base) MCG/ACT inhaler    PROAIR HFA/PROVENTIL HFA/VENTOLIN HFA    3 Inhaler    INHALE TWO PUFFS BY MOUTH FOUR TIMES A DAY AS NEEDED    Chronic obstructive pulmonary disease, unspecified COPD type (H)       amoxicillin 500 MG capsule    AMOXIL    14 capsule    Take 1 capsule (500 mg) by mouth 2 times daily    Bronchitis       aspirin 81 MG tablet     90 tablet    Take 1 tablet (81 mg) by mouth daily    Coronary artery disease of native artery of native heart with stable angina pectoris (H)       atorvastatin 20 MG tablet    LIPITOR    90 tablet    Take 1 tablet (20 mg) by mouth daily    Coronary artery disease of native artery of native heart with stable angina pectoris (H)       Capsaicin 0.1 % cream    CAPSAICIN HP    42.5 g    Apply topically 3 times daily as needed (pain)    Flank pain       ciprofloxacin 500 MG tablet    CIPRO    28 tablet    Take 1 tablet (500 mg) by mouth 2 times daily    Gross hematuria, Acute cystitis with hematuria       clopidogrel 75 MG tablet    PLAVIX    90 tablet    Take 1 tablet (75 mg) by mouth daily    Coronary artery disease of native artery of native heart with stable angina pectoris (H)       docusate sodium 100 MG tablet    COLACE    60 tablet    Take 100 mg by mouth 2 times daily as needed for constipation    Other constipation       dorzolamide-timolol 2-0.5 % ophthalmic solution    COSOPT    1 Bottle    PLACE 1 DROP IN THE RIGHT EYE ONCE DAILY    Primary open angle glaucoma of right eye, mild stage       fluticasone 50 MCG/ACT spray    FLONASE    1 Bottle    Spray 2 sprays into both nostrils daily    Post-nasal drip       furosemide 20 MG tablet    LASIX    90 tablet    Take 1 tablet (20 mg) by mouth daily    Essential hypertension       HYDROCERIN Crea     180 g    Apply topically 3 times daily as needed    Dry skin       * isosorbide mononitrate 60 MG 24 hr tablet    IMDUR    30 tablet    Take 1 tablet (60 mg) by mouth daily    Coronary artery disease of native artery  of native heart with stable angina pectoris (H), Essential hypertension       * Isosorbide Mononitrate  MG Tb24     90 tablet    Take 1 tablet (120 mg) by mouth daily    Coronary artery disease of native artery of native heart with stable angina pectoris (H)       lidocaine 5 % ointment    XYLOCAINE    30 g    Apply to affected area three times a day as needed    Chronic low back pain, unspecified back pain laterality, with sciatica presence unspecified       lisinopril 40 MG tablet    PRINIVIL/ZESTRIL    90 tablet    Take 1 tablet (40 mg) by mouth daily    Essential hypertension       metoprolol tartrate 100 MG tablet    LOPRESSOR    90 tablet    Take 0.5 tablets (50 mg) by mouth 2 times daily    Essential hypertension       mometasone-formoterol 200-5 MCG/ACT oral inhaler    DULERA    3 Inhaler    INHALE TWO PUFFS BY MOUTH TWICE A DAY    Chronic obstructive pulmonary disease, unspecified COPD type (H)       nitroGLYcerin 0.4 MG/SPRAY spray    NITROLINGUAL     DISSOLVE 1 SPRAY UNDER TONGUE EVERY 5 MINUTES AS NEEDED FOR CHEST PAIN        order for DME     1 Units    TENS unit    Chronic bilateral low back pain without sciatica       order for DME     1 Units    Equipment being ordered: Home Blood pressure monitor    Essential hypertension       order for DME     1 Device    Equipment being ordered: TENS    Cervicalgia       polyvinyl alcohol 1.4 % ophthalmic solution    LIQUIFILM TEARS    15 mL    Place 1 drop into the right eye as needed for dry eyes    Dry eyes       ranitidine 150 MG tablet    ZANTAC    90 tablet    Take 1 tablet (150 mg) by mouth daily    Gastroesophageal reflux disease, esophagitis presence not specified       ranolazine 500 MG 12 hr tablet    RANEXA     Take 500 mg by mouth daily        rosuvastatin 10 MG tablet    CRESTOR    90 tablet    Take 1 tablet (10 mg) by mouth daily    Coronary artery disease of native artery of native heart with stable angina pectoris (H)       sertraline 50 MG  tablet    ZOLOFT    90 tablet    Take 1 tablet (50 mg) by mouth daily    Depression, unspecified depression type       sodium chloride 0.65 % nasal spray    OCEAN    3 Bottle    Spray in each nostril 3-4 times daily as needed    Preventive measure       TAB-A-MARZENA Tabs     90 tablet    Take 1 tablet by mouth daily    Routine adult health maintenance       terazosin 2 MG capsule    HYTRIN    30 capsule    Take 1 capsule (2 mg) by mouth At Bedtime    Benign prostatic hyperplasia without lower urinary tract symptoms       tiotropium 18 MCG capsule    SPIRIVA    90 capsule    Inhale 1 capsule (18 mcg) into the lungs daily    Chronic obstructive pulmonary disease, unspecified COPD type (H)       traZODone 50 MG tablet    DESYREL    180 tablet    Take 1/2 to 2 tablets by mouth at bedtime as needed for sleep.    Insomnia, unspecified type       vitamin D 2000 units tablet     100 tablet    Take 2,000 Units by mouth daily    Preventive measure       * Notice:  This list has 2 medication(s) that are the same as other medications prescribed for you. Read the directions carefully, and ask your doctor or other care provider to review them with you.

## 2018-10-25 NOTE — TELEPHONE ENCOUNTER
I did not get a fax from the pharmacy.  Talked to Madina and saw the note form Dr. Luna in the Specialty Comments.  Looks like Rosuvastatin not covered, but a reason for the switch.  Would you like a PA?

## 2018-10-29 NOTE — TELEPHONE ENCOUNTER
Dr. Angelo, would you like a PA for this?  Patient has not been on a cholesterol medication for 2 months.

## 2018-10-30 RX ORDER — ATORVASTATIN CALCIUM 20 MG/1
20 TABLET, FILM COATED ORAL DAILY
Qty: 90 TABLET | Refills: 3 | Status: SHIPPED | OUTPATIENT
Start: 2018-10-30 | End: 2019-03-01

## 2018-10-31 NOTE — TELEPHONE ENCOUNTER
This is Dr. Pastor's patient. I saw him one time.    Please route all messages to him.  Alab Angelo MD  Routed to [facility] and [facility].

## 2018-11-05 ENCOUNTER — OFFICE VISIT (OUTPATIENT)
Dept: FAMILY MEDICINE | Facility: CLINIC | Age: 73
End: 2018-11-05
Payer: MEDICARE

## 2018-11-05 VITALS
DIASTOLIC BLOOD PRESSURE: 78 MMHG | SYSTOLIC BLOOD PRESSURE: 124 MMHG | OXYGEN SATURATION: 97 % | TEMPERATURE: 98.3 F | HEART RATE: 62 BPM | RESPIRATION RATE: 20 BRPM

## 2018-11-05 DIAGNOSIS — J40 BRONCHITIS: Primary | ICD-10-CM

## 2018-11-05 DIAGNOSIS — J42 CHRONIC BRONCHITIS, UNSPECIFIED CHRONIC BRONCHITIS TYPE (H): ICD-10-CM

## 2018-11-05 RX ORDER — GUAIFENESIN/DEXTROMETHORPHAN 100-10MG/5
SYRUP ORAL
Qty: 118 ML | Refills: 0 | Status: SHIPPED | OUTPATIENT
Start: 2018-11-05 | End: 2019-01-16

## 2018-11-05 RX ORDER — LEVOFLOXACIN 500 MG/1
500 TABLET, FILM COATED ORAL DAILY
Qty: 7 TABLET | Refills: 0 | Status: SHIPPED | OUTPATIENT
Start: 2018-11-05 | End: 2019-01-30

## 2018-11-05 NOTE — PATIENT INSTRUCTIONS
We are treating you again for bronchitis with a mucolytic to loosen up the mucus. The antibiotics are to be taken only once a day. Please follow-up in one week. If you still have the same problems, we probably need to refer to the lung doctor.

## 2018-11-05 NOTE — MR AVS SNAPSHOT
After Visit Summary   2018    Srikanth Graves    MRN: [id number]           Patient Information     Date Of Birth          1945        Visit Information        Provider Department      2018 3:50 PM Rohit Pastor MD [facility]        Today's Diagnoses     Bronchitis    -  1      Care Instructions    We are treating you again for bronchitis with a mucolytic to loosen up the mucus. The antibiotics are to be taken only once a day. Please follow-up in one week. If you still have the same problems, we probably need to refer to the lung doctor.          Follow-ups after your visit        Who to contact     Please call your clinic at [phone] to:    Ask questions about your health    Make or cancel appointments    Discuss your medicines    Learn about your test results    Speak to your doctor            Additional Information About Your Visit        MyChart Information     [employer] is an electronic gateway that provides easy, online access to your medical records. With [employer], you can request a clinic appointment, read your test results, renew a prescription or communicate with your care team.     To sign up for [employer] visit the website at [email or web address]   You will be asked to enter the access code listed below, as well as some personal information. Please follow the directions to create your username and password.     Your access code is: [email or web address]  Expires: 2018  7:49 AM     Your access code will  in 90 days. If you need help or a new code, please contact your [facility] or call [phone] for assistance.        Care EveryWhere ID     This is your Care EveryWhere ID. This could be used by other organizations to access your [facility] medical records  [id number]         Blood Pressure from Last 3 Encounters:   10/24/18 165/83   18 150/79   18 140/72    Weight from Last 3 Encounters:   10/24/18 188 lb 3.2 oz (85.4  kg)   09/24/18 189 lb (85.7 kg)   09/18/18 186 lb 9.6 oz (84.6 kg)              Today, you had the following     No orders found for display         Today's Medication Changes          These changes are accurate as of 11/5/18  4:50 PM.  If you have any questions, ask your nurse or doctor.               Start taking these medicines.        Dose/Directions    guaiFENesin-dextromethorphan 100-10 MG/5ML syrup   Commonly known as:  ROBITUSSIN DM   Used for:  Bronchitis   Started by:  Rohit Pastor MD        twice  A day as needed for cough   Quantity:  118 mL   Refills:  0       levofloxacin 500 MG tablet   Commonly known as:  LEVAQUIN   Used for:  Bronchitis   Started by:  Rohit Pastor MD        Dose:  500 mg   Take 1 tablet (500 mg) by mouth daily   Quantity:  7 tablet   Refills:  0            Where to get your medicines      These medications were sent to [facility] - [place], [place] - [address], [place] [address]     Phone:  [phone]     guaiFENesin-dextromethorphan 100-10 MG/5ML syrup    levofloxacin 500 MG tablet                Primary Care Provider Office Phone # Fax #    Rohit Pastor -[phone]       [address] [place] [id number]        Equal Access to Services     STEPHANIE FLOWERS AH: Daphney sommerso Sonika, waaxda luqadaha, qaybta kaalmada adeegyada, evens salomon. So [facility] [phone].    ATENCIÓN: Si habla español, tiene a andres disposición servicios gratuitos de asistencia lingüística. Llame al [phone].    We comply with applicable federal civil rights laws and [place] laws. We do not discriminate on the basis of race, color, national origin, age, disability, sex, sexual orientation, or gender identity.            Thank you!     Thank you for choosing [facility]  for your care. Our goal is always to provide you with excellent care. Hearing back from our patients is one way we can continue to improve our  services. Please take a few minutes to complete the written survey that you may receive in the mail after your visit with us. Thank you!             Your Updated Medication List - Protect others around you: Learn how to safely use, store and throw away your medicines at www.disposemymeds.org.          This list is accurate as of 11/5/18  4:50 PM.  Always use your most recent med list.                   Brand Name Dispense Instructions for use Diagnosis    acetaminophen 500 MG tablet    TYLENOL    180 tablet    Take 2 tablets (1,000 mg) by mouth 3 times daily    Chronic low back pain, unspecified back pain laterality, with sciatica presence unspecified       albuterol 108 (90 Base) MCG/ACT inhaler    PROAIR HFA/PROVENTIL HFA/VENTOLIN HFA    3 Inhaler    INHALE TWO PUFFS BY MOUTH FOUR TIMES A DAY AS NEEDED    Chronic obstructive pulmonary disease, unspecified COPD type (H)       amoxicillin 500 MG capsule    AMOXIL    14 capsule    Take 1 capsule (500 mg) by mouth 2 times daily    Bronchitis       aspirin 81 MG tablet     90 tablet    Take 1 tablet (81 mg) by mouth daily    Coronary artery disease of native artery of native heart with stable angina pectoris (H)       atorvastatin 20 MG tablet    LIPITOR    90 tablet    Take 1 tablet (20 mg) by mouth daily    Coronary artery disease of native artery of native heart with stable angina pectoris (H)       Capsaicin 0.1 % cream    CAPSAICIN HP    42.5 g    Apply topically 3 times daily as needed (pain)    Flank pain       ciprofloxacin 500 MG tablet    CIPRO    28 tablet    Take 1 tablet (500 mg) by mouth 2 times daily    Gross hematuria, Acute cystitis with hematuria       clopidogrel 75 MG tablet    PLAVIX    90 tablet    Take 1 tablet (75 mg) by mouth daily    Coronary artery disease of native artery of native heart with stable angina pectoris (H)       docusate sodium 100 MG tablet    COLACE    60 tablet    Take 100 mg by mouth 2 times daily as needed for constipation     Other constipation       dorzolamide-timolol 2-0.5 % ophthalmic solution    COSOPT    1 Bottle    PLACE 1 DROP IN THE RIGHT EYE ONCE DAILY    Primary open angle glaucoma of right eye, mild stage       fluticasone 50 MCG/ACT spray    FLONASE    1 Bottle    Spray 2 sprays into both nostrils daily    Post-nasal drip       furosemide 20 MG tablet    LASIX    90 tablet    Take 1 tablet (20 mg) by mouth daily    Essential hypertension       guaiFENesin-dextromethorphan 100-10 MG/5ML syrup    ROBITUSSIN DM    118 mL    twice  A day as needed for cough    Bronchitis       HYDROCERIN Crea     180 g    Apply topically 3 times daily as needed    Dry skin       * isosorbide mononitrate 60 MG 24 hr tablet    IMDUR    30 tablet    Take 1 tablet (60 mg) by mouth daily    Coronary artery disease of native artery of native heart with stable angina pectoris (H), Essential hypertension       * Isosorbide Mononitrate  MG Tb24     90 tablet    Take 1 tablet (120 mg) by mouth daily    Coronary artery disease of native artery of native heart with stable angina pectoris (H)       levofloxacin 500 MG tablet    LEVAQUIN    7 tablet    Take 1 tablet (500 mg) by mouth daily    Bronchitis       lidocaine 5 % ointment    XYLOCAINE    30 g    Apply to affected area three times a day as needed    Chronic low back pain, unspecified back pain laterality, with sciatica presence unspecified       lisinopril 40 MG tablet    PRINIVIL/ZESTRIL    90 tablet    Take 1 tablet (40 mg) by mouth daily    Essential hypertension       metoprolol tartrate 100 MG tablet    LOPRESSOR    90 tablet    Take 0.5 tablets (50 mg) by mouth 2 times daily    Essential hypertension       mometasone-formoterol 200-5 MCG/ACT oral inhaler    DULERA    3 Inhaler    INHALE TWO PUFFS BY MOUTH TWICE A DAY    Chronic obstructive pulmonary disease, unspecified COPD type (H)       nitroGLYcerin 0.4 MG/SPRAY spray    NITROLINGUAL     DISSOLVE 1 SPRAY UNDER TONGUE EVERY 5  MINUTES AS NEEDED FOR CHEST PAIN        order for DME     1 Units    TENS unit    Chronic bilateral low back pain without sciatica       order for DME     1 Units    Equipment being ordered: Home Blood pressure monitor    Essential hypertension       order for DME     1 Device    Equipment being ordered: TENS    Cervicalgia       polyvinyl alcohol 1.4 % ophthalmic solution    LIQUIFILM TEARS    15 mL    Place 1 drop into the right eye as needed for dry eyes    Dry eyes       ranitidine 150 MG tablet    ZANTAC    90 tablet    Take 1 tablet (150 mg) by mouth daily    Gastroesophageal reflux disease, esophagitis presence not specified       ranolazine 500 MG 12 hr tablet    RANEXA     Take 500 mg by mouth daily        rosuvastatin 10 MG tablet    CRESTOR    90 tablet    Take 1 tablet (10 mg) by mouth daily    Coronary artery disease of native artery of native heart with stable angina pectoris (H)       sertraline 50 MG tablet    ZOLOFT    90 tablet    Take 1 tablet (50 mg) by mouth daily    Depression, unspecified depression type       sodium chloride 0.65 % nasal spray    OCEAN    3 Bottle    Spray in each nostril 3-4 times daily as needed    Preventive measure       TAB-A-MARZENA Tabs     90 tablet    Take 1 tablet by mouth daily    Routine adult health maintenance       terazosin 2 MG capsule    HYTRIN    30 capsule    Take 1 capsule (2 mg) by mouth At Bedtime    Benign prostatic hyperplasia without lower urinary tract symptoms       tiotropium 18 MCG capsule    SPIRIVA    90 capsule    Inhale 1 capsule (18 mcg) into the lungs daily    Chronic obstructive pulmonary disease, unspecified COPD type (H)       traZODone 50 MG tablet    DESYREL    180 tablet    Take 1/2 to 2 tablets by mouth at bedtime as needed for sleep.    Insomnia, unspecified type       vitamin D 2000 units tablet     100 tablet    Take 2,000 Units by mouth daily    Preventive measure       * Notice:  This list has 2 medication(s) that are the same as  other medications prescribed for you. Read the directions carefully, and ask your doctor or other care provider to review them with you.

## 2018-11-06 NOTE — PROGRESS NOTES
"S: Srikanth Graves is a 73 year old male who returns for follow up of cough and chest discomfort with cough  Patients states that main concern today is a \"growl\" in his chest and being unable to expectorate mucus from his chest. He was seen in the ED on 10/20/18 for the same complaints. Chest x-ray at that time was normal, no antibiotics were administered and pt signed out AMA.     He has been seen by pulmonology on 12/18/17 (Dr. PATRICK Yanez) who did not feel his symptoms were pulmonary-related bud cardiac. His CT at that time was also unremarkable. He was also seen by them on 8/12/15 for dyspnea, at which point they also stated his symptoms were consistent with stable angina as opposed to pulmonary-related. His PFTs at that time were found to \"look good.\"    Ct abd from 1/22/18:  Result Impression   CONCLUSION/:  1.  No bowel obstruction. Likely no acute appendicitis. Mild wall thickening of the terminal ileum is likely incidental.  2.  Normal kidneys. No hydronephrosis or hydroureter. No ureteral calculi.  3.  Infrarenal abdominal aortic aneurysm, unchanged. Coronary artery calcification.  4.  Scarring and bronchiectasis in the included lungs.  5.  Ill-defined low-attenuation of the pancreatic head and neck, unchanged in appearance and nonspecific. This would be better assessed with MRI.       At his last visit on 10/24/18, pt was diagnosed with bronchitis 2/2 his COPD. He was given 1 week of amoxicillin at this time but feels it has not relieved his symptoms. Today, he is requesting anything else that can help him.    PMHX/PSHX/MEDS/ALLERGIES/SHX/FHX reviewed and updated in Epic.    ROS:  General: No fevers, chills  Head: No headache  CV: No chest pain or palpitations.  Resp: No hemoptysis. +cough, unable to expectorate. +dyspnea.    O: /78  Pulse 62  Temp 98.3  F (36.8  C) (Oral)  Resp 20  SpO2 97%   Gen:  Well nourished and in NAD  HEENT: PERRLA; TMs normal color and landmarks; nasopharynx pink and " "moist; oropharynx pink and moist  CV:  RRR  - no murmurs, rubs, or gallups  Pulm:  CTAB, no wheezes/rales/rhonchi, good air entry   ABD: soft, nontender, no masses, no rebound, BS intact throughout  Extrem: no cyanosis, edema or clubbing  Psych: Euthymic      (J40) Bronchitis/CIOPD (primary encounter diagnosis)  Comment: discussed with pt that symptoms are secondary to his COPD/bronchitis 2/2 his smoking history and there are limited options. We have already try  amoxicillin x7 days with  initial relief of symtoms . Today, we will try one more antibiotic as well as an expectorant to help loosen the mucus.  Plan:  Will try levofloxacin (LEVAQUIN) 500 MG tablet,         guaiFENesin-dextromethorphan (ROBITUSSIN DM)         100-10 MG/5ML syrup    Declines pulmonary med referral/\"seen them twice already\"      RTC in 1 weeks, for follow up of bronchitis/COPD or sooner if develops new or worsening symptoms.    Rohit Pastor      "

## 2018-11-28 ENCOUNTER — OFFICE VISIT (OUTPATIENT)
Dept: FAMILY MEDICINE | Facility: CLINIC | Age: 73
End: 2018-11-28
Payer: MEDICARE

## 2018-11-28 VITALS
HEIGHT: 64 IN | SYSTOLIC BLOOD PRESSURE: 160 MMHG | DIASTOLIC BLOOD PRESSURE: 67 MMHG | OXYGEN SATURATION: 93 % | HEART RATE: 66 BPM | BODY MASS INDEX: 32.13 KG/M2 | RESPIRATION RATE: 28 BRPM | TEMPERATURE: 98.2 F | WEIGHT: 188.2 LBS

## 2018-11-28 DIAGNOSIS — R07.89 CHEST WALL PAIN: ICD-10-CM

## 2018-11-28 DIAGNOSIS — G89.29 CHRONIC LOW BACK PAIN WITHOUT SCIATICA, UNSPECIFIED BACK PAIN LATERALITY: ICD-10-CM

## 2018-11-28 DIAGNOSIS — M50.30 DEGENERATION OF CERVICAL INTERVERTEBRAL DISC: Primary | ICD-10-CM

## 2018-11-28 DIAGNOSIS — M54.50 CHRONIC LOW BACK PAIN WITHOUT SCIATICA, UNSPECIFIED BACK PAIN LATERALITY: ICD-10-CM

## 2018-11-28 ASSESSMENT — ANXIETY QUESTIONNAIRES
7. FEELING AFRAID AS IF SOMETHING AWFUL MIGHT HAPPEN: SEVERAL DAYS
5. BEING SO RESTLESS THAT IT IS HARD TO SIT STILL: NOT AT ALL
2. NOT BEING ABLE TO STOP OR CONTROL WORRYING: SEVERAL DAYS
GAD7 TOTAL SCORE: 3
6. BECOMING EASILY ANNOYED OR IRRITABLE: SEVERAL DAYS
3. WORRYING TOO MUCH ABOUT DIFFERENT THINGS: NOT AT ALL
1. FEELING NERVOUS, ANXIOUS, OR ON EDGE: NOT AT ALL
IF YOU CHECKED OFF ANY PROBLEMS ON THIS QUESTIONNAIRE, HOW DIFFICULT HAVE THESE PROBLEMS MADE IT FOR YOU TO DO YOUR WORK, TAKE CARE OF THINGS AT HOME, OR GET ALONG WITH OTHER PEOPLE: NOT DIFFICULT AT ALL

## 2018-11-28 ASSESSMENT — PAIN SCALES - GENERAL: PAINLEVEL: MODERATE PAIN (5)

## 2018-11-28 ASSESSMENT — PATIENT HEALTH QUESTIONNAIRE - PHQ9
5. POOR APPETITE OR OVEREATING: NOT AT ALL
SUM OF ALL RESPONSES TO PHQ QUESTIONS 1-9: 8
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
## 2018-11-28 NOTE — NURSING NOTE
Chief Complaint   Patient presents with     RECHECK     Still coughing, patient states the Rx prescribed on last visit did not work for his respiratory symptoms. He also wants a referral to Pulmonology.      Tommie Catalan CMA

## 2018-11-28 NOTE — MR AVS SNAPSHOT
After Visit Summary   11/28/2018    Srikanth Graves    MRN: [id number]           Patient Information     Date Of Birth          1945        Visit Information        Provider Department      11/28/2018 4:10 PM Rohit Pastor MD [facility]        Today's Diagnoses     Degeneration of lumbar intervertebral disc    -  1    Chest wall pain        Chronic low back pain without sciatica, unspecified back pain laterality          Care Instructions    As discussed in  clinic I am ordering the a repeat MRI  Of your  Back( thorax and lumbar)   you had one  MRI of  Your  Lower back on March 2017   In addition you had CAT scan of abdomen and pelvis on  March 2017 at [facility] and another CATscan of abdomen and pelvis at [facility] in January 2018   All of those exams/imaging were  OK but show t hat your spine(backbone) is wearing down  Please return to clinic once you have the MRIs done          Follow-ups after your visit        Future tests that were ordered for you today     Open Future Orders        Priority Expected Expires Ordered    MRI LUMBAR SPINE W/O CONTRAST Routine  11/29/2019 11/29/2018    MRI THORACIC SPINE W/O CONTRAST Routine  12/29/2019 11/29/2018            Who to contact     Please call your clinic at [phone] to:    Ask questions about your health    Make or cancel appointments    Discuss your medicines    Learn about your test results    Speak to your doctor            Additional Information About Your Visit        MyChart Information     [employer] is an electronic gateway that provides easy, online access to your medical records. With [employer], you can request a clinic appointment, read your test results, renew a prescription or communicate with your care team.     To sign up for UGOBEt visit the website at [email or web address]   You will be asked to enter the access code listed below, as well as some personal information. Please follow the directions to create your username  "and password.     Your access code is: [email or web address]  Expires: 2019  6:53 PM     Your access code will  in 90 days. If you need help or a new code, please contact your [facility] or call [phone] for assistance.        Care EveryWhere ID     This is your Care EveryWhere ID. This could be used by other organizations to access your [facility] medical records  [id number]        Your Vitals Were     Pulse Temperature Respirations Height Pulse Oximetry BMI (Body Mass Index)    66 98.2  F (36.8  C) (Oral) 28 5' 4.37\" (163.5 cm) 93% 31.93 kg/m2       Blood Pressure from Last 3 Encounters:   18 160/67   18 124/78   10/24/18 165/83    Weight from Last 3 Encounters:   18 188 lb 3.2 oz (85.4 kg)   10/24/18 188 lb 3.2 oz (85.4 kg)   18 189 lb (85.7 kg)               Primary Care Provider Office Phone # Fax #    Rohit Pastor [phone]       [address] [place], [place] [id number]        Equal Access to Services     STEPHANIE FLOWERS AH: Hadii lily ennis hadasho Soomaali, waaxda luqadaha, qaybta kaalmada adeegyada, evens salomon. So [facility] [phone].    ATENCIÓN: Si habla español, tiene a andres disposición servicios gratuitos de asistencia lingüística. Llame al [phone].    We comply with applicable federal civil rights laws and [place] laws. We do not discriminate on the basis of race, color, national origin, age, disability, sex, sexual orientation, or gender identity.            Thank you!     Thank you for choosing [facility]  for your care. Our goal is always to provide you with excellent care. Hearing back from our patients is one way we can continue to improve our services. Please take a few minutes to complete the written survey that you may receive in the mail after your visit with us. Thank you!             Your Updated Medication List - Protect others around you: Learn how to safely use, store and throw away your " medicines at www.disposemymeds.org.          This list is accurate as of 11/28/18 11:59 PM.  Always use your most recent med list.                   Brand Name Dispense Instructions for use Diagnosis    acetaminophen 500 MG tablet    TYLENOL    180 tablet    Take 2 tablets (1,000 mg) by mouth 3 times daily    Chronic low back pain, unspecified back pain laterality, with sciatica presence unspecified       albuterol 108 (90 Base) MCG/ACT inhaler    PROAIR HFA/PROVENTIL HFA/VENTOLIN HFA    3 Inhaler    INHALE TWO PUFFS BY MOUTH FOUR TIMES A DAY AS NEEDED    Chronic obstructive pulmonary disease, unspecified COPD type (H)       amoxicillin 500 MG capsule    AMOXIL    14 capsule    Take 1 capsule (500 mg) by mouth 2 times daily    Bronchitis       aspirin 81 MG tablet    ASA    90 tablet    Take 1 tablet (81 mg) by mouth daily    Coronary artery disease of native artery of native heart with stable angina pectoris (H)       atorvastatin 20 MG tablet    LIPITOR    90 tablet    Take 1 tablet (20 mg) by mouth daily    Coronary artery disease of native artery of native heart with stable angina pectoris (H)       Capsaicin 0.1 % cream    CAPSAICIN HP    42.5 g    Apply topically 3 times daily as needed (pain)    Flank pain       ciprofloxacin 500 MG tablet    CIPRO    28 tablet    Take 1 tablet (500 mg) by mouth 2 times daily    Gross hematuria, Acute cystitis with hematuria       clopidogrel 75 MG tablet    PLAVIX    90 tablet    Take 1 tablet (75 mg) by mouth daily    Coronary artery disease of native artery of native heart with stable angina pectoris (H)       docusate sodium 100 MG tablet    COLACE    60 tablet    Take 100 mg by mouth 2 times daily as needed for constipation    Other constipation       dorzolamide-timolol 2-0.5 % ophthalmic solution    COSOPT    1 Bottle    PLACE 1 DROP IN THE RIGHT EYE ONCE DAILY    Primary open angle glaucoma of right eye, mild stage       fluticasone 50 MCG/ACT nasal spray    FLONASE     1 Bottle    Spray 2 sprays into both nostrils daily    Post-nasal drip       furosemide 20 MG tablet    LASIX    90 tablet    Take 1 tablet (20 mg) by mouth daily    Essential hypertension       guaiFENesin-dextromethorphan 100-10 MG/5ML syrup    ROBITUSSIN DM    118 mL    twice  A day as needed for cough    Bronchitis       HYDROCERIN Crea     180 g    Apply topically 3 times daily as needed    Dry skin       * isosorbide mononitrate 60 MG 24 hr tablet    IMDUR    30 tablet    Take 1 tablet (60 mg) by mouth daily    Coronary artery disease of native artery of native heart with stable angina pectoris (H), Essential hypertension       * isosorbide mononitrate  MG 24 HR ER tablet    IMDUR    90 tablet    Take 1 tablet (120 mg) by mouth daily    Coronary artery disease of native artery of native heart with stable angina pectoris (H)       levofloxacin 500 MG tablet    LEVAQUIN    7 tablet    Take 1 tablet (500 mg) by mouth daily    Bronchitis       lidocaine 5 % external ointment    XYLOCAINE    30 g    Apply to affected area three times a day as needed    Chronic low back pain, unspecified back pain laterality, with sciatica presence unspecified       lisinopril 40 MG tablet    PRINIVIL/ZESTRIL    90 tablet    Take 1 tablet (40 mg) by mouth daily    Essential hypertension       metoprolol tartrate 100 MG tablet    LOPRESSOR    90 tablet    Take 0.5 tablets (50 mg) by mouth 2 times daily    Essential hypertension       mometasone-formoterol 200-5 MCG/ACT inhaler    DULERA    3 Inhaler    INHALE TWO PUFFS BY MOUTH TWICE A DAY    Chronic obstructive pulmonary disease, unspecified COPD type (H)       nitroGLYcerin 0.4 MG/SPRAY spray    NITROLINGUAL     DISSOLVE 1 SPRAY UNDER TONGUE EVERY 5 MINUTES AS NEEDED FOR CHEST PAIN        order for DME     1 Units    TENS unit    Chronic bilateral low back pain without sciatica       order for DME     1 Units    Equipment being ordered: Home Blood pressure monitor     Essential hypertension       order for DME     1 Device    Equipment being ordered: TENS    Cervicalgia       polyvinyl alcohol 1.4 % ophthalmic solution    LIQUIFILM TEARS    15 mL    Place 1 drop into the right eye as needed for dry eyes    Dry eyes       ranitidine 150 MG tablet    ZANTAC    90 tablet    Take 1 tablet (150 mg) by mouth daily    Gastroesophageal reflux disease, esophagitis presence not specified       ranolazine 500 MG 12 hr tablet    RANEXA     Take 500 mg by mouth daily        rosuvastatin 10 MG tablet    CRESTOR    90 tablet    Take 1 tablet (10 mg) by mouth daily    Coronary artery disease of native artery of native heart with stable angina pectoris (H)       sertraline 50 MG tablet    ZOLOFT    90 tablet    Take 1 tablet (50 mg) by mouth daily    Depression, unspecified depression type       sodium chloride 0.65 % nasal spray    OCEAN    3 Bottle    Spray in each nostril 3-4 times daily as needed    Preventive measure       TAB-A-MARZENA Tabs     90 tablet    Take 1 tablet by mouth daily    Routine adult health maintenance       terazosin 2 MG capsule    HYTRIN    30 capsule    Take 1 capsule (2 mg) by mouth At Bedtime    Benign prostatic hyperplasia without lower urinary tract symptoms       tiotropium 18 MCG inhaled capsule    SPIRIVA    90 capsule    Inhale 1 capsule (18 mcg) into the lungs daily    Chronic obstructive pulmonary disease, unspecified COPD type (H)       traZODone 50 MG tablet    DESYREL    180 tablet    Take 1/2 to 2 tablets by mouth at bedtime as needed for sleep.    Insomnia, unspecified type       vitamin D3 2000 units tablet    CHOLECALCIFEROL    100 tablet    Take 2,000 Units by mouth daily    Preventive measure       * Notice:  This list has 2 medication(s) that are the same as other medications prescribed for you. Read the directions carefully, and ask your doctor or other care provider to review them with you.

## 2018-11-29 ASSESSMENT — ANXIETY QUESTIONNAIRES: GAD7 TOTAL SCORE: 3

## 2018-11-29 NOTE — PATIENT INSTRUCTIONS
As discussed in  clinic I am ordering the a repeat MRI  Of your  Back( thorax and lumbar)   you had one  MRI of  Your  Lower back on March 2017   In addition you had CAT scan of abdomen and pelvis on  March 2017 at [facility] and another CATscan of abdomen and pelvis at [facility] in January 2018   All of those exams/imaging were  OK but show t hat your spine(backbone) is wearing down  Please return to clinic once you have the MRIs done    MRI T AND L-SPINE:  Left voicemail message for patient to contact referral clinic to schedule.  Demetria Rosa  12/3/18    Spoke to  and Radiologists will review order and progress notes and call patient to schedule due to medical history of stent placement in right hip joint.  Orders, demographics and progress notes faxed to [facility] scheduling.  PH:  [phone]   FAX:  [phone]  Demetria Rosa  12/3/18    [facility]  Radiology Department 1st floor  [address] [address]  [phone]  Date:   Friday December 7, 2018  Time:  5:45 PM  Given to patient.  Demetria Rosa  12/4/18    Due to patient's stent placement, [facility] radiologist does not feel patient is MRI safe and has requested that patient go back to [facility] for MRI.  Therefore orders, demographics and progress notes faxed to [facility] radiology and they will contact patient to schedule.  PH:  [phone]   FAX:  [phone]  Demetria MCKINNEY Pack  12/31/18

## 2018-12-03 ENCOUNTER — RECORDS - HEALTHEAST (OUTPATIENT)
Dept: ADMINISTRATIVE | Facility: OTHER | Age: 73
End: 2018-12-03

## 2018-12-07 ENCOUNTER — TELEPHONE (OUTPATIENT)
Dept: FAMILY MEDICINE | Facility: CLINIC | Age: 73
End: 2018-12-07

## 2018-12-07 NOTE — TELEPHONE ENCOUNTER
[facility] phone call message- general phone call:    Reason for call: Pt is scheduled for tonight and they think he has a stent in and have other questions.    Return call needed: Yes    OK to leave a message on voice mail? Yes    Primary language: English      needed? No    Call taken on December 7, 2018 at 4:18 PM by Cheyanne Lawson

## 2018-12-07 NOTE — TELEPHONE ENCOUNTER
Spoke with MRI tech from [facility].  Patient is currently scheduled for an MRI of thoracic/lumbar spine.  Upon performing the MRI, patient is telling staff that he has had stents placed and this was done at [facility] back in approx. 2002.  MRI staff is trying to get those records from [facility] but it may take a few days as the medical records are paper vs. EHR.    For now- MRI is on hold until they can confirm what types of stents patient has.    Routed to Dr. Pastor/LALA Murphy RN

## 2018-12-19 ENCOUNTER — TELEPHONE (OUTPATIENT)
Dept: FAMILY MEDICINE | Facility: CLINIC | Age: 73
End: 2018-12-19

## 2018-12-20 ENCOUNTER — TELEPHONE (OUTPATIENT)
Dept: FAMILY MEDICINE | Facility: CLINIC | Age: 73
End: 2018-12-20

## 2018-12-20 DIAGNOSIS — R52 PAIN: Primary | ICD-10-CM

## 2018-12-20 RX ORDER — LIDOCAINE 50 MG/G
1 PATCH TOPICAL EVERY 24 HOURS
Qty: 90 PATCH | Refills: 0 | Status: SHIPPED | OUTPATIENT
Start: 2018-12-20 | End: 2019-01-02

## 2018-12-31 ENCOUNTER — TELEPHONE (OUTPATIENT)
Dept: FAMILY MEDICINE | Facility: CLINIC | Age: 73
End: 2018-12-31

## 2018-12-31 DIAGNOSIS — G89.29 CHRONIC LOW BACK PAIN, UNSPECIFIED BACK PAIN LATERALITY, WITH SCIATICA PRESENCE UNSPECIFIED: ICD-10-CM

## 2018-12-31 DIAGNOSIS — R10.9 FLANK PAIN: ICD-10-CM

## 2018-12-31 DIAGNOSIS — M54.5 CHRONIC LOW BACK PAIN, UNSPECIFIED BACK PAIN LATERALITY, WITH SCIATICA PRESENCE UNSPECIFIED: ICD-10-CM

## 2018-12-31 NOTE — TELEPHONE ENCOUNTER
Dr. Pastor, Lidocaine Patch was not cover by insurance, do you want to do PA for it or send a different med.  Insurance will cover for Diclofenac 1 % gel.  Please advise.

## 2019-01-02 DIAGNOSIS — R52 PAIN: Primary | ICD-10-CM

## 2019-01-02 RX ORDER — ASPIRIN 81 MG
TABLET,CHEWABLE ORAL 3 TIMES DAILY PRN
Qty: 42.5 G | Refills: 1 | Status: CANCELLED | OUTPATIENT
Start: 2019-01-02

## 2019-01-02 RX ORDER — ACETAMINOPHEN 500 MG
500 TABLET ORAL EVERY 8 HOURS PRN
Qty: 100 TABLET | Refills: 1 | Status: SHIPPED | OUTPATIENT
Start: 2019-01-02 | End: 2020-02-26

## 2019-01-10 ENCOUNTER — DOCUMENTATION ONLY (OUTPATIENT)
Dept: FAMILY MEDICINE | Facility: CLINIC | Age: 74
End: 2019-01-10

## 2019-01-10 DIAGNOSIS — I25.118 CORONARY ARTERY DISEASE OF NATIVE ARTERY OF NATIVE HEART WITH STABLE ANGINA PECTORIS (H): ICD-10-CM

## 2019-01-10 RX ORDER — ISOSORBIDE MONONITRATE 120 MG/1
120 TABLET, EXTENDED RELEASE ORAL DAILY
Qty: 90 TABLET | Refills: 3 | Status: SHIPPED | OUTPATIENT
Start: 2019-01-10 | End: 2019-03-01

## 2019-01-10 NOTE — PROGRESS NOTES
Patient came in with some questions regarding his MRI's. He went for the MRI but he had told them he had stents placed back in 2002 about and he was not sure if they are metal or rubber. So they had to stop the MRI and have him sign an JOCELYN for that information.     So the patient came in today because he wanted get some information on what was going on. I called over to [place]'s Medical Records department they were not sure if anything was sent over so I called over to [facility]. They said that [facility] has sent an JOCELYN over which [facility] had to call over for a rush on the file at their other department that holds onto their old records. So they said that if I send an JOCELYN over they can send the records to me and to [facility] so that we all can be on the same page. I will contact the patient with the following information hoping that they have the information sooner rather than later.     Will recheck next week if they have all the information sent over to us and [facility].

## 2019-01-16 ENCOUNTER — OFFICE VISIT (OUTPATIENT)
Dept: FAMILY MEDICINE | Facility: CLINIC | Age: 74
End: 2019-01-16
Payer: MEDICARE

## 2019-01-16 VITALS
RESPIRATION RATE: 16 BRPM | SYSTOLIC BLOOD PRESSURE: 158 MMHG | DIASTOLIC BLOOD PRESSURE: 82 MMHG | HEART RATE: 51 BPM | OXYGEN SATURATION: 99 % | TEMPERATURE: 98.6 F

## 2019-01-16 DIAGNOSIS — M54.2 NECK PAIN: Primary | ICD-10-CM

## 2019-01-16 ASSESSMENT — PAIN SCALES - GENERAL: PAINLEVEL: SEVERE PAIN (6)

## 2019-01-16 NOTE — PATIENT INSTRUCTIONS
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

## 2019-01-16 NOTE — PROGRESS NOTES
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  12/5/18 CAD/stents, CABG  Iliac stents bilaterally  Coronary disease with very extensive past history of this well documented and discussed in many previous notes. He has persistent New York Heart Association Class III angina unless the throat symptom is not angina, but it is consistent with angina. He is on maximally tolerated doses of beta blockers and nitrates as well as Ranexa and this syndrome is stable. Unfortunately, that procedure to stent the degenerated vein graft seems to have had little impact on his symptoms, although it may well have restored blood flow since that area was largely infarcted, though the benefits from this are likely minimal.    RECOMMENDATIONS: As he is stable, I would leave his medications where they are. He has good outside primary care at this point and he can follow with us every 6 months. He has seen Zeke Gotti on many occasions, and so we can schedule the next visit with him. We would be happy to see him back should things change in the interim.    ROS:  General: No fevers, chills  Head: No headache  Ears: No acute change in hearing.    CV: No chest pain or palpitations.  Resp: No shortness of breath.  No cough. No hemoptysis.  GI: No nausea, vomiting, constipation, diarrhea  : No urinary pains    O: /82  (BP Location: Left arm, Patient Position: Sitting, Cuff Size: Adult Regular)   Pulse 51   Temp 98.6  F (37  C) (Oral)   Resp 16   SpO2 99%    Gen:  Well nourished and in NAD  ck: supple without lymphadenopathy  CV:  RRR  - no murmurs, rubs, or gallups,   Pulm:  CTAB, no wheezes/rales/rhonchi, good air entry   ABD: soft, nontender, no masses, no rebound, BS intact throughout  Extrem: no cyanosis, edema or clubbing  Psych: Euthymic     1 Neck pain/headaches: this is due to DJD which also is the  root of back an lumbar pain   He is able to do his ADLs well and  has appropriate  care for his musculoskeletal and cardiovascular care  He needs to be active as much as He can   I ok ok having MRIs of thorax and lumbar area again but I don't think it will change his  Medical care since he is ambulating well   He decided today to have MRI at [facility]/where they have all his record concerning stents, neck and hip surgeries and previous CTs and MRIs   I gave naproxen/medium strength to be taken  as needed  for his  musculoskeletal pain         RTC after MRIs or sooner if develops new or worsening symptoms.    Rohit Pastor

## 2019-01-25 ENCOUNTER — OFFICE VISIT (OUTPATIENT)
Dept: FAMILY MEDICINE | Facility: CLINIC | Age: 74
End: 2019-01-25
Payer: MEDICARE

## 2019-01-25 VITALS
SYSTOLIC BLOOD PRESSURE: 178 MMHG | RESPIRATION RATE: 16 BRPM | HEART RATE: 57 BPM | OXYGEN SATURATION: 99 % | BODY MASS INDEX: 31.9 KG/M2 | WEIGHT: 188 LBS | DIASTOLIC BLOOD PRESSURE: 79 MMHG | TEMPERATURE: 98.1 F

## 2019-01-25 DIAGNOSIS — R07.89 OTHER CHEST PAIN: ICD-10-CM

## 2019-01-25 DIAGNOSIS — I10 ESSENTIAL HYPERTENSION: Primary | ICD-10-CM

## 2019-01-25 RX ORDER — AMLODIPINE BESYLATE 5 MG/1
5 TABLET ORAL DAILY
Qty: 30 TABLET | Refills: 0 | Status: SHIPPED | OUTPATIENT
Start: 2019-01-25 | End: 2019-01-30

## 2019-01-25 RX ORDER — AMLODIPINE BESYLATE 5 MG/1
5 TABLET ORAL DAILY
Qty: 90 TABLET | Refills: 3 | Status: SHIPPED | OUTPATIENT
Start: 2019-01-25 | End: 2019-01-30

## 2019-01-25 NOTE — PATIENT INSTRUCTIONS
Start taking amlodipine 5 mg once a day for your high blood pressure.     If you develop chest pain or shortness of breath over the weekend please seek care in the ED.     Follow up in clinic in 3-5 days.       It was nice to meet you,     Anjum Padilla, PGY2  [facility]

## 2019-01-25 NOTE — PROGRESS NOTES
SUBJECTIVE       Srikanth Graves is a 73 year old  male with a PMHx significant for   Patient Active Problem List   Diagnosis     Other constipation     Advance care planning     Bunion of great toe     Degeneration of cervical intervertebral disc     Chronic airway obstruction (H)     Chronic low back pain     Chronic pain disorder     Chronic obstructive pulmonary disease (H)     Coronary artery disease of native artery of native heart with stable angina pectoris (H)     Depression     DJD (degenerative joint disease), ankle and foot     Dyspnea on exertion     Edema     Esophageal reflux     Essential hypertension     Headache disorder     Peripheral vascular disease (H)     Primary open angle glaucoma of right eye, mild stage     Vitamin D deficiency     Cocaine abuse in remission (H)     Coccydynia     Clavus     Eye globe prosthesis     Hallux valgus, acquired     Neck pain     Routine adult health maintenance     Controlled substance agreement terminated     Testicular cyst     Who presents today with high blood pressure.     Srikanth was at the health department to take an HIV test and STD testing and was noted to be hypertensive up into the 190-200s systolic. He takes his blood pressure medications every morning at 7 am and does not miss doses.     He did have some left sided chest pain this morning that lasted form 3AM until roughly 9-10 o'clock this am. He took 3 tylenol at 7 am and the pain went away. No current chest pain.  No new shortness of breath. No lightheadedness or dizziness. He has had an MI in the past and this chest pain is not like when he had his MI. It is non exertional. He says its more left sided rib pain and sometimes he gets the same pain on the right. He sees a cardiologist at [facility] and was last seen in the past month.        ROS: As stated in HPI.    PMH, Medications and Allergies were reviewed and updated as needed.          OBJECTIVE     Vitals:    01/25/19 1308 01/25/19 1313   BP:  169/76 178/79   Pulse: 57    Resp: 16    Temp: 98.1  F (36.7  C)    TempSrc: Oral    SpO2: 99%    Weight: 85.3 kg (188 lb)      Body mass index is 31.9 kg/m .     BP recheck: 157/75    Gen:  Sitting in exam chair, pleasant, NAD  HEENT: Normocephalic, atraumatic. EOMI, no scleral icterus. Mucous membranes moist  Neck: Supple. No anterior cervical lymphadenopathy  CV:  RRR. No murmurs, rubs, or gallops. Good peripheral pulses  Pulm:  CTAB, no wheezes, rales, or rhonchi  Chest: No rashes. Mild TTP over his left ribs where he was feeling the pain.   ABD: Bowel sounds present. Abdomen soft and nontender throughout, no masses or rebound  Extrem: Warm and well perfused. Strength appears normal  Neuro: Alert, oriented to person, place, and time  Psych: Mood and affect appropriate    No results found for this or any previous visit (from the past 24 hour(s)).    ASSESSMENT AND PLAN     1. Essential hypertension  Srikanth presents with elevated blood pressures up in to the 190-200 systolic after being seen at [facility]. On exam here he remains hypertensive up into the 150-170s systolic. He is maxed out on lisinopril and his heart rate is too low to increase metoprolol. He is additionally on imdur for chest pain. Will add amlodipine 5 mg daily and have him follow up in 1 week for recheck.    - amLODIPine (NORVASC) 5 MG tablet; Take 1 tablet (5 mg) by mouth daily  Dispense: 90 tablet; Refill: 3    2. Other chest pain  ECG NSR. He states the chest pain he was having is more left rib pain and reproducible on exam. He remembers the chest pain when he had an MI in the past and this is nothing like it. Given this, in addition to ECG with no ischemic changes, cardiac etiology of chest pain is unlikely. It is likely musculoskeletal in origin and is improved after taking tylenol.   - EKG 12-lead complete w/read - Clinics      RTC in 1-2 weeks  for follow up of HTN or sooner if develops new or worsening symptoms. Return precautions  discussed.     Discussed with MD Anjum Smalls, PGY-2  [facility]

## 2019-01-25 NOTE — PROGRESS NOTES
Preceptor Attestation:   Patient seen, evaluated and discussed with the resident. I personally viewed the EKG and agree with the interpretation documented by the resident. I have verified the content of the note, which accurately reflects my assessment of the patient and the plan of care.   Supervising Physician:  Yevgeniy Moore MD

## 2019-01-30 ENCOUNTER — OFFICE VISIT (OUTPATIENT)
Dept: FAMILY MEDICINE | Facility: CLINIC | Age: 74
End: 2019-01-30
Payer: MEDICARE

## 2019-01-30 ENCOUNTER — OFFICE VISIT (OUTPATIENT)
Dept: PHARMACY | Facility: CLINIC | Age: 74
End: 2019-01-30
Payer: MEDICARE

## 2019-01-30 VITALS
DIASTOLIC BLOOD PRESSURE: 74 MMHG | RESPIRATION RATE: 20 BRPM | HEART RATE: 61 BPM | SYSTOLIC BLOOD PRESSURE: 179 MMHG | BODY MASS INDEX: 31.93 KG/M2 | TEMPERATURE: 97.6 F | WEIGHT: 188.2 LBS

## 2019-01-30 DIAGNOSIS — I10 ESSENTIAL HYPERTENSION: ICD-10-CM

## 2019-01-30 DIAGNOSIS — H40.1111 PRIMARY OPEN ANGLE GLAUCOMA OF RIGHT EYE, MILD STAGE: ICD-10-CM

## 2019-01-30 DIAGNOSIS — R52 PAIN: ICD-10-CM

## 2019-01-30 DIAGNOSIS — L85.3 DRY SKIN: ICD-10-CM

## 2019-01-30 DIAGNOSIS — Z00.00 PREVENTATIVE HEALTH CARE: Primary | ICD-10-CM

## 2019-01-30 DIAGNOSIS — Z00.00 ROUTINE ADULT HEALTH MAINTENANCE: ICD-10-CM

## 2019-01-30 DIAGNOSIS — I25.118 CORONARY ARTERY DISEASE OF NATIVE ARTERY OF NATIVE HEART WITH STABLE ANGINA PECTORIS (H): ICD-10-CM

## 2019-01-30 LAB
BUN SERPL-MCNC: 18.9 MG/DL (ref 7–21)
CALCIUM SERPL-MCNC: 9.8 MG/DL (ref 8.5–10.1)
CHLORIDE SERPLBLD-SCNC: 108.4 MMOL/L (ref 98–110)
CO2 SERPL-SCNC: 30.1 MMOL/L (ref 20–32)
CREAT SERPL-MCNC: 1.1 MG/DL (ref 0.7–1.3)
GFR SERPL CREATININE-BSD FRML MDRD: 70.1 ML/MIN/1.7 M2
GLUCOSE SERPL-MCNC: 118.4 MG'DL (ref 70–99)
POTASSIUM SERPL-SCNC: 3.8 MMOL/DL (ref 3.2–4.6)
SODIUM SERPL-SCNC: 143.1 MMOL/L (ref 132–142)

## 2019-01-30 RX ORDER — NITROGLYCERIN 400 UG/1
SPRAY ORAL
Qty: 4.9 G | Refills: 3 | Status: SHIPPED | OUTPATIENT
Start: 2019-01-30 | End: 2020-06-05

## 2019-01-30 RX ORDER — DORZOLAMIDE HYDROCHLORIDE AND TIMOLOL MALEATE 20; 5 MG/ML; MG/ML
SOLUTION/ DROPS OPHTHALMIC
Qty: 1 BOTTLE | Refills: 11 | COMMUNITY
Start: 2019-01-30

## 2019-01-30 RX ORDER — LANOLIN ALCOHOL/MO/W.PET/CERES
CREAM (GRAM) TOPICAL
Qty: 228 G | Refills: 3 | Status: SHIPPED | OUTPATIENT
Start: 2019-01-30 | End: 2019-06-25

## 2019-01-30 RX ORDER — AMLODIPINE BESYLATE 10 MG/1
10 TABLET ORAL DAILY
Qty: 90 TABLET | Refills: 3 | Status: SHIPPED | OUTPATIENT
Start: 2019-01-30 | End: 2019-11-07

## 2019-01-30 RX ORDER — MULTIVITAMIN WITH FOLIC ACID 400 MCG
1 TABLET ORAL DAILY
Qty: 90 TABLET | Refills: 3 | Status: SHIPPED | OUTPATIENT
Start: 2019-01-30 | End: 2020-07-24

## 2019-01-30 RX ORDER — AMLODIPINE BESYLATE 5 MG/1
10 TABLET ORAL DAILY
Qty: 90 TABLET | Refills: 3 | Status: SHIPPED | OUTPATIENT
Start: 2019-01-30 | End: 2019-01-30

## 2019-01-30 NOTE — PATIENT INSTRUCTIONS
-Do NOT take more than 6 tablets of 500 mg Tylenol in one day (Max of 3000 mg of Tylenol in one day)  -Take Naproxen 375 mg twice daily with meals for pain. You picked up this new medication on January 17th.  -Please  Voltaren gel to use on the skin for pain as needed  -Please  Atorvastatin 20 mg and take it every day.   -Please stop taking Amlodipine 5 mg daily and  Amlodipine 10 mg daily and take it every day.  -Refills have been sent for your skin cream and multivitamins.

## 2019-01-30 NOTE — PROGRESS NOTES
SUBJECTIVE       Srikanth Graves is a 73 year old  male with a PMHx significant for   Patient Active Problem List   Diagnosis     Other constipation     Advance care planning     Bunion of great toe     Degeneration of cervical intervertebral disc     Chronic airway obstruction (H)     Chronic low back pain     Chronic pain disorder     Chronic obstructive pulmonary disease (H)     Coronary artery disease of native artery of native heart with stable angina pectoris (H)     Depression     DJD (degenerative joint disease), ankle and foot     Dyspnea on exertion     Edema     Esophageal reflux     Essential hypertension     Headache disorder     Peripheral vascular disease (H)     Primary open angle glaucoma of right eye, mild stage     Vitamin D deficiency     Cocaine abuse in remission (H)     Coccydynia     Clavus     Eye globe prosthesis     Hallux valgus, acquired     Neck pain     Routine adult health maintenance     Controlled substance agreement terminated     Testicular cyst     Who presents today for follow up of hypertension.     He was last seen 5 days ago and was found to be hypertensive up into the 190-200 systolic. Amlodipine 5 mg daily was added during that visit. His other blood pressure medications include lasix 20 mg daily, imdur 120 mg daily, lisinopril 40 mg daily, and metoprolol 50 mg BID.     Today, he remains hypertensive. No new chest pain or pressure. No exertional chest pain. He does continue to have left and right sided rib pain. The pain is not in his left chest like when he had a heart attack.No shortness of breath. No light headedness or dizziness. No vision changes.       Patient speaks English and so an  was not used.    ROS: As stated in HPI.    PMH, Medications and Allergies were reviewed and updated as needed.          OBJECTIVE     Vitals:    01/30/19 1409 01/30/19 1412   BP: 194/77 179/74   Pulse: 61    Resp: 20    Temp: 97.6  F (36.4  C)    TempSrc: Oral    Weight:  85.4 kg (188 lb 3.2 oz)      Body mass index is 31.93 kg/m .    Gen:  Sitting in exam chair, pleasant, NAD  HEENT: Normocephalic, atraumatic. EOMI, no scleral icterus.   Neck: Supple.   CV:  RRR. No murmurs, rubs, or gallops. Good peripheral pulses  Pulm:  CTAB, no wheezes, rales, or rhonchi  Extrem: Warm and well perfused. Strength appears normal. No lower extremity edema.   Neuro: Alert, oriented to person, place, and time  Psych: Mood and affect appropriate    No results found for this or any previous visit (from the past 24 hour(s)).    ASSESSMENT AND PLAN     1. Essential hypertension  Srikanth remains hypertensive despite starting amlodipine 5 mg daily and thus will double this. He is asymptomatic today. He is additionally taking lasix 20 mg daily, lisinopril 40 mg daily, imdur 120 mg daily, and metoprolol 50 mg BID. Will obtain a BMP today to check is renal function and electrolytes. If his blood pressure remains elevated may consider adding a thiazide at next visit.   - amLODIPine (NORVASC) 10 MG tablet; Take 1 tablet (10 mg) by mouth daily  Dispense: 90 tablet; Refill: 3  - Basic Metabolic Panel ([facility])    2. Preventative health care  - Fecal Occult Blood - FIT, iFOB ([facility]); Future      RTC in 1-2 weeks for follow up of hypertension or sooner if develops new or worsening symptoms. Return precautions discussed.     Discussed with MD Anjum Oneill, PGY-2  [facility]

## 2019-01-30 NOTE — PROGRESS NOTES
"Medication Management Note                                                       Srikanth was referred by Dr. Padilla for pharmacy services for CMM.    MEDICATION REVIEW:  Discussed all medication indications, dosage and effectiveness, adverse effects, and adherence with patient/caregiver.    Pt had meds with them: no  Pt had med list with them: no  Pt was knowledgeable about meds: yes, for the most part  Medications set up by: Self  Medications administered by someone else (e.g., LTCF): No  Pt uses a medication box or automated dispenser: no    Medication Discrepancies  Medications (including OTCs and supplements) on EMR med list that pt is NOT taking:  yes, Atorvastatin, Cipro, Voltaren gel, Flonase, Imdur 60 mg, Levaquin, Lidocaine gel, Flagyl, Rosuvastatin, Saline nasal spray  Medications pt IS taking that are NOT on EMR med list (e.g., from specialist, hospital): none  Dosage or frequency listed differently than how patient is taking: yes, Dorzolamide eye drops  Duplicate medication on list (two occurrences of the same medication):  yes, Amlodipine, Isosorbide  TOTAL NUMBER OF MEDICATION DISCREPANCIES:  13    Subjective                                                       Patient reports the following problems or concerns with their medications:  No  Patient reports the following adverse reactions to medications:  none  Pt reports missing doses:  Never - has a consistent schedule  Additional subjective information (e.g., reason for visit, frequency of PRNs, reasons meds were D/C ed):    Taking APAP 500 mg - 5-8 tablets/day for side/back/leg/headache pains    Pain feels like he's \"taking a jab\" and feels worse when he's walking, rolling over in bed, and twists his body    Says in the past he used to take methadone for 4 years for his knees, but it didn't work for him so he quit taking it and stopped going to the pain clinic    Has not used his albuterol inhaler for a couple months, but has one at home    Unsure if " he's taking Rosuvastatin or Atorvastatin; unsure if taking Clopidogrel; unsure if taking Naproxen    States whatever medications he has at home he takes    Has had issues with obtaining Lidocaine gel and Lidocaine patch - says there was an issue with PAs    Called pharmacy and they stated both would require a PA; most likely lidocaine ointment would be covered with PA before the patch    Patient does not think the Ranexa is working for his chronic angina - says he has chest pain daily that comes and goes; feels this chest tightening with a tensing/pinching sensation from his elbows up to his shoulders in both arms    States he gets excruciating headaches when he takes nitroglycerin tabs, but he is able to tolerate nitroglycerin spray; says he has a supply at home    Requesting refill for multivitamins and hydrocerin cream    Using Dorzolamide BID instead of daily per his optometrist    Says his only drug allergy is to Nitroglycerin; is unfamiliar with Gabapentin    Using docusate daily for bowels - no complaints    Objective                                                       Patient Active Problem List   Diagnosis     Other constipation     Advance care planning     Bunion of great toe     Degeneration of cervical intervertebral disc     Chronic airway obstruction (H)     Chronic low back pain     Chronic pain disorder     Chronic obstructive pulmonary disease (H)     Coronary artery disease of native artery of native heart with stable angina pectoris (H)     Depression     DJD (degenerative joint disease), ankle and foot     Dyspnea on exertion     Edema     Esophageal reflux     Essential hypertension     Headache disorder     Peripheral vascular disease (H)     Primary open angle glaucoma of right eye, mild stage     Vitamin D deficiency     Cocaine abuse in remission (H)     Coccydynia     Clavus     Eye globe prosthesis     Hallux valgus, acquired     Neck pain     Routine adult health maintenance      Controlled substance agreement terminated     Testicular cyst       Current Outpatient Medications   Medication Sig Dispense Refill     acetaminophen (TYLENOL) 500 MG tablet Take 1 tablet (500 mg) by mouth every 8 hours as needed for mild pain 100 tablet 1     albuterol (PROAIR HFA/PROVENTIL HFA/VENTOLIN HFA) 108 (90 BASE) MCG/ACT Inhaler INHALE TWO PUFFS BY MOUTH FOUR TIMES A DAY AS NEEDED 3 Inhaler 3     amLODIPine (NORVASC) 10 MG tablet Take 1 tablet (10 mg) by mouth daily 90 tablet 3     aspirin 81 MG tablet Take 1 tablet (81 mg) by mouth daily 90 tablet 3     atorvastatin (LIPITOR) 20 MG tablet Take 1 tablet (20 mg) by mouth daily 90 tablet 3     Cholecalciferol (VITAMIN D) 2000 UNITS tablet Take 2,000 Units by mouth daily 100 tablet 3     clopidogrel (PLAVIX) 75 MG tablet Take 1 tablet (75 mg) by mouth daily 90 tablet 3     diclofenac (VOLTAREN) 1 % topical gel Apply 2 g topically 4 times daily as needed for moderate pain 100 g 3     docusate sodium (COLACE) 100 MG tablet Take 100 mg by mouth 2 times daily as needed for constipation 60 tablet 11     dorzolamide-timolol (COSOPT) 2-0.5 % ophthalmic solution PLACE 1 DROP IN THE RIGHT EYE ONCE DAILY 1 Bottle 11     furosemide (LASIX) 20 MG tablet Take 1 tablet (20 mg) by mouth daily 90 tablet 3     isosorbide mononitrate CR (IMDUR) 120 MG 24 HR ER tablet Take 1 tablet (120 mg) by mouth daily 90 tablet 3     lisinopril (PRINIVIL/ZESTRIL) 40 MG tablet Take 1 tablet (40 mg) by mouth daily 90 tablet 3     metoprolol tartrate (LOPRESSOR) 100 MG tablet Take 0.5 tablets (50 mg) by mouth 2 times daily 90 tablet 3     mometasone-formoterol (DULERA) 200-5 MCG/ACT oral inhaler INHALE TWO PUFFS BY MOUTH TWICE A DAY 3 Inhaler 3     Multiple Vitamin (TAB-A-MARZENA) TABS Take 1 tablet by mouth daily 90 tablet 3     naproxen (NAPROSYN) 375 MG tablet Take 1 tablet (375 mg) by mouth 2 times daily (with meals) 60 tablet 1     nitroGLYcerin (NITROLINGUAL) 0.4 MG/SPRAY spray For chest  pain spray 1 spray under tongue every 5 minutes for 3 doses. If symptoms persist 5 minutes after 1st dose call 911. 4.9 g 3     order for DME Equipment being ordered: TENS 1 Device 0     order for DME Equipment being ordered: Home Blood pressure monitor 1 Units 0     polyvinyl alcohol (LIQUIFILM TEARS) 1.4 % ophthalmic solution Place 1 drop into the right eye as needed for dry eyes 15 mL 3     ranitidine (ZANTAC) 150 MG tablet Take 1 tablet (150 mg) by mouth daily 90 tablet 3     ranolazine (RANEXA) 500 MG 12 hr tablet Take 500 mg by mouth daily       rosuvastatin (CRESTOR) 10 MG tablet Take 1 tablet (10 mg) by mouth daily 90 tablet 3     sertraline (ZOLOFT) 50 MG tablet Take 1 tablet (50 mg) by mouth daily 90 tablet 3     Skin Protectants, Misc. (HYDROCERIN) CREA Apply topically 3 times daily as needed for dry skin 228 g 3     terazosin (HYTRIN) 2 MG capsule Take 1 capsule (2 mg) by mouth At Bedtime 30 capsule 11     tiotropium (SPIRIVA) 18 MCG capsule Inhale 1 capsule (18 mcg) into the lungs daily 90 capsule 3     traZODone (DESYREL) 50 MG tablet Take 1/2 to 2 tablets by mouth at bedtime as needed for sleep. 180 tablet 3       Social History     Tobacco Use     Smoking status: Former Smoker     Packs/day: 1.00     Types: Cigarettes     Last attempt to quit: 2016     Years since quittin.1     Smokeless tobacco: Never Used   Substance Use Topics     Alcohol use: No     Drug use: No       CrCl cannot be calculated (Patient's most recent lab result is older than the maximum 90 days allowed.).    Lab Results   Component Value Date    A1C 5.7 2018     Last Comprehensive Metabolic Panel:  Sodium   Date Value Ref Range Status   2018 142.0 132.0 - 142.0 mmol/L Final     Potassium   Date Value Ref Range Status   2018 4.0 3.2 - 4.6 mmol/dL Final     Chloride   Date Value Ref Range Status   2018 102.7 98.0 - 110.0 mmol/L Final     Carbon Dioxide   Date Value Ref Range Status   2018 30.0  20.0 - 32.0 mmol/L Final     Glucose   Date Value Ref Range Status   06/20/2018 94.1 70.0 - 99.0 mg'dL Final     Urea Nitrogen   Date Value Ref Range Status   06/20/2018 15.3 7.0 - 21.0 mg/dL Final     Creatinine   Date Value Ref Range Status   06/20/2018 1.2 0.7 - 1.3 mg/dL Final     GFR Estimate   Date Value Ref Range Status   06/20/2018 63.4 >60.0 mL/min/1.7 m2 Final     Calcium   Date Value Ref Range Status   06/20/2018 9.8 8.5 - 10.1 mg/dL Final       BP Readings from Last 3 Encounters:   01/30/19 179/74   01/25/19 178/79   01/25/19 178/79       The ASCVD Risk score (Monique CHOU Jr., et al., 2013) failed to calculate for the following reasons:    Cannot find a previous HDL lab    Cannot find a previous total cholesterol lab    PHQ-9 score:    PHQ-9 SCORE 11/28/2018   PHQ-9 Total Score 8       Assessment / Plan                                                       Updated medication list in the EMR; deleted meds patient no longer taking and added meds patient is now taking, and changed doses where there was a dose discrepancy.    All medications were reviewed and found to be indicated, effective, safe and convenient/ affordable unless drug therapy problem(s) was/were identified, as are described below.      Completed at this visit    HTN     /79, HR 57 (1/25/19); /7, repeat 179/74, HR 61 today    Taking Lasix 20 mg, Lisinopril 40 mg, Imdur 120 mg, Metoprolol 50 mg BID, Amlodipine 5 mg (new - started 1/25/19) [also takes Terazosin 2 mg]    Increased Amlodipine to 10 mg daily - sent new rx to pharmacy    CAD     Called pharmacy to determine which statin was last picked up - Atorvastatin 10 mg #90 last picked up 8/20/18; currently have new rx for Atorvastatin 20 mg that was never picked up    Informed patient to please  Atorvastatin 20 mg and start taking daily    Clopidogrel last picked up 1/21/19 #90 by patient per pharmacy    Pharmacy had no recent history of rx for nitroglycerin spray, but  patient states he has two at home    Sent rx for nitroglycerin spray to pharmacy to have stored for future refills    Pain    Taking more than recommended max of tylenol    Educated patient to not take more than 6 tablets (500 mg) or 3000 mg in one day    Called pharmacy to see if patient picked up Naproxen - they state it was picked up 1/17/19 #60    Informed patient to start taking his new rx Naproxen BID    Sent rx for Voltaren gel    [facility]    Sent refills for multivitamins and hydrocerin cream per patient's request    Options for treatment and/or follow-up care were reviewed with the patient.  Srikanth was engaged and actively involved in the decision making process, verbalized understanding of the options discussed, and was satisfied with the final plan.    Patient was provided with written instructions/medication list via AVS.     Follow-up                                                       Medication issues to be addressed at a future visit      Reassess BP at next visit - consider adding thiazide diuretic for further BP control (chlorthalidone preferred)    Reassess pain at next visit - determine if additional therapy is needed    Dr. Padilla was provided the recommendations above  in clinic today and Dr. Mitchell was available for supervision during this visit and is the authorizing prescriber for this visit through the pharmacist collaborative practice agreement.    Shanelle Beach, PharmD Resident      Drug therapy problems identified  1. Med: Amlodipine - Efficacy  - Partially effective - Resolution: Change dose; resolved  2. Med: Atorvastatin - Compliance - Pt misunderstanding - Resolution: Educate patient; resolved   3. Med: APAP - Safety - Dose too high - Resolution: Educate patient; resolved   4. Med: Voltaren gel - Indication - Needs additional drug therapy - Resolution: Intiate Drug; resolved      # of medical conditions addressed: 4  # of medications addressed: 35  # of medication  discrepancies identified: 13  # of DTP identified: 4  Time spent: 30 minutes  Level of service: 5

## 2019-01-30 NOTE — PROGRESS NOTES
Preceptor Attestation:   Patient seen, evaluated and discussed with the resident. I have verified the content of the note, which accurately reflects my assessment of the patient and the plan of care.   Supervising Physician:  Carline Mitchell MD

## 2019-01-31 NOTE — PROGRESS NOTES
I have verified the content of the note, which accurately reflects my assessment of the patient and the plan of care.   Cata Garcia, PharmD

## 2019-02-13 ENCOUNTER — OFFICE VISIT (OUTPATIENT)
Dept: FAMILY MEDICINE | Facility: CLINIC | Age: 74
End: 2019-02-13
Payer: MEDICARE

## 2019-02-13 VITALS
HEART RATE: 63 BPM | WEIGHT: 186.6 LBS | SYSTOLIC BLOOD PRESSURE: 162 MMHG | OXYGEN SATURATION: 96 % | RESPIRATION RATE: 16 BRPM | TEMPERATURE: 98.4 F | BODY MASS INDEX: 31.66 KG/M2 | DIASTOLIC BLOOD PRESSURE: 77 MMHG

## 2019-02-13 DIAGNOSIS — I10 ESSENTIAL HYPERTENSION: Primary | ICD-10-CM

## 2019-02-13 RX ORDER — HYDRALAZINE HYDROCHLORIDE 10 MG/1
10 TABLET, FILM COATED ORAL 2 TIMES DAILY
Qty: 180 TABLET | Refills: 3 | Status: SHIPPED | OUTPATIENT
Start: 2019-02-13 | End: 2020-06-05

## 2019-02-13 NOTE — PROGRESS NOTES
SUBJECTIVE       Srikanth Graves is a 73 year old  male with a PMH significant for:     Patient Active Problem List   Diagnosis     Other constipation     Advance care planning     Bunion of great toe     Degeneration of cervical intervertebral disc     Chronic airway obstruction (H)     Chronic low back pain     Chronic pain disorder     Chronic obstructive pulmonary disease (H)     Coronary artery disease of native artery of native heart with stable angina pectoris (H)     Depression     DJD (degenerative joint disease), ankle and foot     Dyspnea on exertion     Edema     Esophageal reflux     Essential hypertension     Headache disorder     Peripheral vascular disease (H)     Primary open angle glaucoma of right eye, mild stage     Vitamin D deficiency     Cocaine abuse in remission (H)     Coccydynia     Clavus     Eye globe prosthesis     Hallux valgus, acquired     Neck pain     Routine adult health maintenance     Controlled substance agreement terminated     Testicular cyst     He presents for recheck blood pressure. He was recently started newly on amlodipine; it was increased from 5 to 10 mg daily. He has been taking this daily, not missing doses. States that he took it this AM. He denies any adverse effects. No HA, blurry vision, new weakness numbness or tingling. Denies chest pain or SOB. No LE swelling.    He has history of a triple bypass in the past. Just saw his cardiologist at [facility]. Notes states that he appears to be stable and no changes were made. His had a stent place on 2/2018 at [facility].     PMH, Medications and Allergies were reviewed and updated as needed.        REVIEW OF SYSTEMS     See HPI        OBJECTIVE     Vitals:    02/13/19 1450   BP: 162/77   Pulse: 63   Resp: 16   Temp: 98.4  F (36.9  C)   TempSrc: Oral   SpO2: 96%   Weight: 84.6 kg (186 lb 9.6 oz)     Body mass index is 31.66 kg/m .    Gen: Well-appearing elderly male. Alert, oriented and appropriate. NAD  HEENT: PERRL, EOMI,  no conjunctival injection or scleral icterus. MMM  CV: RRR, no rubs, murmurs or extra heart sounds  Pulm: CTAB, no wheezes, rales or rhonchi  Ext: Warm and well-perfused. No LE edema  Neuro: Grossly normal, no focal deficits    No results found for this or any previous visit (from the past 24 hour(s)).        ASSESSMENT AND PLAN     1. Essential hypertension  Blood pressure remains above goal despite initiation of amlodipine. His other BP meds include lisinopril 40 and metoprolol 50 BID. He is also on lasix 20 daily and terazosin. We will initiate hydralazine 10 mg BID for now. In addition, pt was provided with a DME for blood pressure cuff for home monitoring.   - hydrALAZINE (APRESOLINE) 10 MG tablet; Take 1 tablet (10 mg) by mouth 2 times daily  Dispense: 180 tablet; Refill: 3  - order for DME; Equipment being ordered: Blood pressure cuff and machine  Dispense: 1 Device; Refill: 0      RTC in 2-3 weeks for follow up of HTN or sooner if develops new or worsening symptoms.    Michelle Thao I precepted today with Rohit Pastor MD.

## 2019-02-15 DIAGNOSIS — Z00.00 PREVENTATIVE HEALTH CARE: ICD-10-CM

## 2019-02-18 LAB — HEMOCCULT STL QL IA: POSITIVE

## 2019-02-19 ENCOUNTER — TELEPHONE (OUTPATIENT)
Dept: FAMILY MEDICINE | Facility: CLINIC | Age: 74
End: 2019-02-19

## 2019-02-19 DIAGNOSIS — I10 ESSENTIAL HYPERTENSION: ICD-10-CM

## 2019-02-19 DIAGNOSIS — F32.A DEPRESSION, UNSPECIFIED DEPRESSION TYPE: ICD-10-CM

## 2019-02-20 RX ORDER — LISINOPRIL 40 MG/1
40 TABLET ORAL DAILY
Qty: 90 TABLET | Refills: 3 | Status: SHIPPED | OUTPATIENT
Start: 2019-02-20 | End: 2020-04-10

## 2019-02-21 ENCOUNTER — OFFICE VISIT (OUTPATIENT)
Dept: FAMILY MEDICINE | Facility: CLINIC | Age: 74
End: 2019-02-21
Payer: MEDICARE

## 2019-02-21 VITALS
TEMPERATURE: 99 F | BODY MASS INDEX: 31.73 KG/M2 | RESPIRATION RATE: 24 BRPM | OXYGEN SATURATION: 97 % | HEART RATE: 54 BPM | DIASTOLIC BLOOD PRESSURE: 80 MMHG | WEIGHT: 187 LBS | SYSTOLIC BLOOD PRESSURE: 154 MMHG

## 2019-02-21 DIAGNOSIS — Z29.9 PREVENTIVE MEASURE: Primary | ICD-10-CM

## 2019-02-21 NOTE — PROGRESS NOTES
"S: Srikanth Graves is a 73 year old male who returns for follow up of  HTN/   His BP pressure is still high,He was started on hydralazine 10 mg twice  A day   After  calls to pharmacy today, We  realize that he did not  hydralazine     2 Positives FIT test. At risk for upper GI bleed due to NSADS   Had \"normal\" colonoscopy more than 10 years ago result are not available  Patients states that main concern today is  HTN and positive FIT    PMHX/PSHX/MEDS/ALLERGIES/SHX/FHX reviewed and updated in Epic.      ROS:  General: No fevers, chills  Head: No headache  Ears: No acute change in hearing.    CV: No chest pain or palpitations.  Resp: No shortness of breath.  No cough. No hemoptysis.  GI: No nausea, vomiting, constipation, diarrhea  : No urinary pains    O: /80   Pulse 54   Temp 99  F (37.2  C) (Oral)   Resp 24   Wt 84.8 kg (187 lb)   SpO2 97%   BMI 31.73 kg/m     Gen:  Well nourished and in NAD    Neck: supple without lymphadenopathy  CV:  RRR  - no murmurs, rubs, or gallups,   Pulm:  CTAB, no wheezes/rales/rhonchi, good air entry   ABD: soft, nontender, no masses, no rebound, BS intact throughout  Extrem: no cyanosis, edema or clubbing  Psych: Euthymic      HTN/not in target   continue current BP meds and  Start  Hydralazine 10 mg BID  Today   follow-up in 2 weeks for HTN check  2 Positive FIT  Test   will refer him for colonoscopy  Avoid NSAIDs and take tylenol for pain  .    RTC in 2 weeks, for follow up of BP check or sooner if develops new or worsening symptoms.    Rohit Pastor      "

## 2019-02-21 NOTE — LETTER
December 18, 2019      Srikanth Graves  [address] [place] [address]        Dear Srikanth,    You needed repeat  colonoscopy in one year Dec2020         Sincerely,    Rohit Pastor MD

## 2019-02-21 NOTE — PATIENT INSTRUCTIONS
hydralazine 10 mg   to be taken twice day for blood pressure control   Blood  pressure is better today   will order coloscopy   Only tylenol for pain    GASTROENTEROLOGY ADULT REF PROCEDURE ONLY  February 22, 2019 at 2:35 pm Online referral placed with [facility] who will contact patient to schedule. [facility]  Phone [phone]  Fax: [phone]

## 2019-02-25 ENCOUNTER — MEDICAL CORRESPONDENCE (OUTPATIENT)
Dept: HEALTH INFORMATION MANAGEMENT | Facility: CLINIC | Age: 74
End: 2019-02-25

## 2019-02-28 DIAGNOSIS — I10 ESSENTIAL HYPERTENSION: ICD-10-CM

## 2019-02-28 RX ORDER — METOPROLOL TARTRATE 100 MG
50 TABLET ORAL 2 TIMES DAILY
Qty: 90 TABLET | Refills: 3 | Status: SHIPPED | OUTPATIENT
Start: 2019-02-28 | End: 2020-04-10

## 2019-02-28 NOTE — TELEPHONE ENCOUNTER
Talk to Mr Camila   He stooped taken potasium 2 months ago   His recent potasium on1/30/18 in was 3.8   He is on lasix ,hence  K needs to be monitoring but He does not need potasium replacement now

## 2019-03-01 ENCOUNTER — OFFICE VISIT (OUTPATIENT)
Dept: FAMILY MEDICINE | Facility: CLINIC | Age: 74
End: 2019-03-01
Payer: MEDICARE

## 2019-03-01 ENCOUNTER — OFFICE VISIT (OUTPATIENT)
Dept: PHARMACY | Facility: CLINIC | Age: 74
End: 2019-03-01
Payer: MEDICARE

## 2019-03-01 VITALS
DIASTOLIC BLOOD PRESSURE: 74 MMHG | TEMPERATURE: 98.3 F | RESPIRATION RATE: 18 BRPM | SYSTOLIC BLOOD PRESSURE: 146 MMHG | OXYGEN SATURATION: 96 % | BODY MASS INDEX: 31.9 KG/M2 | HEART RATE: 61 BPM | WEIGHT: 188 LBS

## 2019-03-01 VITALS
HEART RATE: 61 BPM | OXYGEN SATURATION: 96 % | TEMPERATURE: 98.3 F | DIASTOLIC BLOOD PRESSURE: 74 MMHG | WEIGHT: 188 LBS | SYSTOLIC BLOOD PRESSURE: 146 MMHG | RESPIRATION RATE: 18 BRPM | BODY MASS INDEX: 31.9 KG/M2

## 2019-03-01 DIAGNOSIS — R42 DIZZINESS: Primary | ICD-10-CM

## 2019-03-01 DIAGNOSIS — I10 ESSENTIAL HYPERTENSION: Primary | ICD-10-CM

## 2019-03-01 ASSESSMENT — PATIENT HEALTH QUESTIONNAIRE - PHQ9: SUM OF ALL RESPONSES TO PHQ QUESTIONS 1-9: 3

## 2019-03-01 NOTE — Clinical Note
Dr Pastor- Please see note regarding self-discontinuation of ranexa, ismn, and statin.Marta-Told patient you would call and reschedule him.  ap

## 2019-03-01 NOTE — NURSING NOTE
Patient here for BP clinic study with Cata Garcia PharmD.  Report by PharmD that patient stated that he is dizzy today and that he stumbled 3 x today while at the [facility].    RN triaged patient.  Patient BP at 151 0pm was 161/91 HR 64, RR 20 Sats 97% RA.  No c/o CP or SOB.  He denies any dizziness/HA right now but he states he was very dizzy about 1 hour ago.  He states he has back pain and that he has been having leg pain for the past couple of days which isn't going away.  He also c/o Right arm pain from the right wrist to the right bicep. Hand grasps equal, arm held up in air with no deviation noted.  Speech clear no slurring.    Will have patient be seen by 3/1 provider today d/t dizziness.    BP at 1555= 161/81, HR 61, RR 20 95% sats on RA.    Patient meeting with PharmD to review medication lists.    LALA Murphy RN

## 2019-03-01 NOTE — PROGRESS NOTES
Medication Management Follow Up Note                                                       I am seeing Srikanth Graves for follow up from a previous visit with the pharmacy team.      Medication Discrepancies  Medications (including OTCs and supplements) on EMR med list that pt is NOT taking:  yes, atorvastatin, rosuvastatin, diclofenac, ISMN, naproxen, ranexa  Medications pt IS taking that are NOT on EMR med list (e.g., from specialist, hospital): none  Dosage or frequency listed differently than how patient is taking: none  Duplicate medication on list (two occurrences of the same medication):  none  TOTAL NUMBER OF MEDICATION DISCREPANCIES:  6    Subjective                                                       Patient reports the following problems or concerns with their medications:  none  Patient reports the following adverse reactions to medications:  none  Pt reports missing doses:  never  Additional subjective information (e.g., reason for visit, frequency of PRNs, reasons meds were D/C ed):    Patient was original here to be part of the OBP study.  When I went in to see him he was c/o three episodes of dizziness in a row today while [place] the [place].  He states that he got up, took a few steps, felt dizzy and stumbled.  This happened two more times. When I was reviewing additional sx , I asked about numbness or weakness on one side of his body, he brought up that he has had bilateral leg pain for the past couple days.  At this point, I triaged patient but continued to go over medications with him.    Pt brought his medications with him.  He does not use a pill box. Descrepancies:     Did not have atorvastatin or rosuvastatin with him.  States that Dr. Pastor told him to stop that some time ago because his cholesterol was good.  Cannot find evidence of this. Atrovastatin last fill was 8/20/18 for a 30 day supply.     Also did not have Ranexa with him. Does not remember if someone told him not to take it or not.  LF of this was 4/11/18.  Interestingly, Pharm note from 1/30/19 and cardiology note from 12/5/18 have him stating he was on it.     Also did not have ISDN.  Pharmacy notes LF was 1/18/19.    Only has acetaminophen for pain.  Diclofenac prescribed recently was not covered by insurance.     Objective                                                       Estimated Creatinine Clearance: 61.2 mL/min (based on SCr of 1.1 mg/dL).    Lab Results   Component Value Date    A1C 5.7 03/07/2018     Last Comprehensive Metabolic Panel:  Sodium   Date Value Ref Range Status   01/30/2019 143.1 (H) 132.0 - 142.0 mmol/L Final     Potassium   Date Value Ref Range Status   01/30/2019 3.8 3.2 - 4.6 mmol/dL Final     Chloride   Date Value Ref Range Status   01/30/2019 108.4 98.0 - 110.0 mmol/L Final     Carbon Dioxide   Date Value Ref Range Status   01/30/2019 30.1 20.0 - 32.0 mmol/L Final     Glucose   Date Value Ref Range Status   01/30/2019 118.4 (H) 70.0 - 99.0 mg'dL Final     Urea Nitrogen   Date Value Ref Range Status   01/30/2019 18.9 7.0 - 21.0 mg/dL Final     Creatinine   Date Value Ref Range Status   01/30/2019 1.1 0.7 - 1.3 mg/dL Final     GFR Estimate   Date Value Ref Range Status   01/30/2019 70.1 >60.0 mL/min/1.7 m2 Final     Calcium   Date Value Ref Range Status   01/30/2019 9.8 8.5 - 10.1 mg/dL Final       BP Readings from Last 3 Encounters:   03/01/19 146/74   03/01/19 146/74   02/21/19 154/80       The ASCVD Risk score (Monique EFFIE Baron., et al., 2013) failed to calculate for the following reasons:    Cannot find a previous HDL lab    Cannot find a previous total cholesterol lab    PHQ-9 score:    PHQ-9 SCORE 3/1/2019   PHQ-9 Total Score 3                 Assessment / Plan                                                        Updated medication list in the EMR; deleted meds patient no longer taking and added meds patient is now taking, and changed doses where there was a dose discrepancy.    Completed at this  visit    Dizziness -  uncontrolled     Defer to Dr. Rincon. Hydralazine was recently started so it is possible it is hypotension, despite current blood pressure not reflecting hypotension    HTN and CAD -  uncontrolled     BP goal <140/90, not met.    Was not able to do OBP study today; will have Precious call and reschedule.    Defer restart Ranexa, ISMN, and statin to Dr. Pastor via routed note.    Options for treatment and/or follow-up care were reviewed with the patient.  Srikanth was engaged and actively involved in the decision making process, verbalized understanding of the options discussed, and was satisfied with the final plan.    Patient was provided with written instructions/medication list via AVS.     Follow-Up                                                         Medication issue to be addressed at a future visit      Follow up blood pressure meds and blood pressure.    Dr. Rincon was provided the recommendations above  in clinic today and Dr. Lee was available for supervision during this visit and is the authorizing prescriber for this visit through the pharmacist collaborative practice agreement.    Cata Garica, PharmD      Drug therapy problems identified - none.    # of medical conditions addressed: 3  # of medications addressed: 24  # of medication discrepancies identified: 6  # of DTP identified: 0  Time spent: 30 minutes  Level of service: 1nc

## 2019-03-01 NOTE — PROGRESS NOTES
Preceptor Attestation:   Patient seen, evaluated and discussed with the resident. I have verified the content of the note, which accurately reflects my assessment of the patient and the plan of care.   Supervising Physician:  William Lee MD

## 2019-03-01 NOTE — PROGRESS NOTES
ASSESSMENT AND PLAN      Srikanth was seen today for dizziness, medication reconciliation and pain.    Diagnoses and all orders for this visit:    Dizziness: Possible that patient had an episode of orthostatic hypotension, he has never had symptoms like this in the past.  No neurological deficits noted today.  Unable to reproduce symptoms in the clinic today.  Given normal blood pressures today, recommended increased fluid intake. May need to decrease antihypertensives if this continues.  Patient to follow-up with Dr. Pastor if this is continuing.       Leg pain: Patient having new onset pain in bilateral lower extremities.  No concerning signs for DVT.  Symptoms not associated with exertion, making PAD less likely.  -Continue to follow, if symptoms continuing consider electrolyte workup.    Garret Rincon MD PGY3  [facility] Medicine                SUBJECTIVE       Srikanth Graves is a 73 year old  male with a PMH significant for:     Patient Active Problem List   Diagnosis     Other constipation     Advance care planning     Bunion of great toe     Degeneration of cervical intervertebral disc     Chronic airway obstruction (H)     Chronic low back pain     Chronic pain disorder     Chronic obstructive pulmonary disease (H)     Coronary artery disease of native artery of native heart with stable angina pectoris (H)     Depression     DJD (degenerative joint disease), ankle and foot     Dyspnea on exertion     Edema     Esophageal reflux     Essential hypertension     Headache disorder     Peripheral vascular disease (H)     Primary open angle glaucoma of right eye, mild stage     Vitamin D deficiency     Cocaine abuse in remission (H)     Coccydynia     Clavus     Eye globe prosthesis     Hallux valgus, acquired     Neck pain     Routine adult health maintenance     Controlled substance agreement terminated     Testicular cyst     He presents with dizziness. Patient had some dizziness today at 1pm this afternoon.  "This lasted 2 minutes. This occurred after standing up and walking about 10 feet. Then he felt wobbly. Sensation improved with rest for 20 seconds. He noted no spinning sensation. He has not had this sensation in the past. No chest pain, palpitations, diaphoresis.  Patient was originally here clinic for blood pressure study with pharmacy, and patient was referred to triage due to this dizziness.    Patient also having posterior leg pain since Monday or Tuesday. Pain is not improved yet, but it does not disrupt his function. He notes that it \"feels like muscle spasms.\"  Patient denies any worsening with exertion.  Patient is not overly concerned with this and notes \"the been doing with pain my whole life, I will discontinue to deal with this.    PMH, Medications and Allergies were reviewed and updated as needed.            OBJECTIVE     Vitals:    03/01/19 1623 03/01/19 1624   BP: 148/73 146/74   BP Location: Left arm Left arm   Patient Position: Sitting Sitting   Cuff Size: Adult Regular Adult Regular   Pulse: 61 61   Resp: 18    Temp: 98.3  F (36.8  C)    TempSrc: Oral    SpO2: 96%    Weight: 85.3 kg (188 lb)      Body mass index is 31.9 kg/m .    General: Well-appearing, no acute distress, answering questions appropriately.  HEENT: Extraocular movements intact, no nystagmus noted.  PERRLA.  CV: S1, S2, regular rate and rhythm.  No murmurs noted.  Respiratory: Clear to auscultation bilaterally.  No crackles or wheezes.  Neuro: Alert and oriented x3, cranial nerves II through XII intact bilaterally.  5/5 upper and lower extremity strength bilaterally.  Normal finger to nose testing.  Patient using a cane, otherwise normal gait.  MSK: Mild bilateral calf and hamstring tenderness to palpation.  No swelling or erythema noted.    No results found for this or any previous visit (from the past 24 hour(s)).          "

## 2019-03-01 NOTE — PATIENT INSTRUCTIONS
-If you're feeling worsening dizziness/wobbly feeling or noting it more frequently, please call the clinic to follow-up with Dr. Pastor.  -Dr. Pastor will call your cardiologist regarding your heart medication and let you know what you need to do regarding that.

## 2019-03-05 NOTE — PROGRESS NOTES
Preceptor Attestation:   Patient seen, evaluated and discussed with the resident. I have verified the content of the note, which accurately reflects my assessment of the patient and the plan of care.   Supervising Physician:  Rohit Pastor MD

## 2019-03-20 ENCOUNTER — DOCUMENTATION ONLY (OUTPATIENT)
Dept: FAMILY MEDICINE | Facility: CLINIC | Age: 74
End: 2019-03-20

## 2019-03-22 ENCOUNTER — OFFICE VISIT (OUTPATIENT)
Dept: FAMILY MEDICINE | Facility: CLINIC | Age: 74
End: 2019-03-22
Payer: MEDICARE

## 2019-03-22 VITALS
OXYGEN SATURATION: 95 % | SYSTOLIC BLOOD PRESSURE: 130 MMHG | BODY MASS INDEX: 31.73 KG/M2 | RESPIRATION RATE: 14 BRPM | WEIGHT: 187 LBS | TEMPERATURE: 98 F | DIASTOLIC BLOOD PRESSURE: 73 MMHG | HEART RATE: 62 BPM

## 2019-03-22 DIAGNOSIS — G89.29 CHRONIC RIGHT-SIDED THORACIC BACK PAIN: Primary | ICD-10-CM

## 2019-03-22 DIAGNOSIS — R23.8 SCALP IRRITATION: ICD-10-CM

## 2019-03-22 DIAGNOSIS — M54.6 CHRONIC RIGHT-SIDED THORACIC BACK PAIN: Primary | ICD-10-CM

## 2019-03-22 LAB
ALBUMIN SERPL-MCNC: 4.4 MG/DL (ref 3.3–4.9)
ALP SERPL-CCNC: 74.2 U/L (ref 40–150)
ALT SERPL-CCNC: 20.2 U/L (ref 0–45)
AST SERPL-CCNC: 20.6 U/L (ref 0–55)
BILIRUB SERPL-MCNC: 0.4 MG/DL (ref 0.2–1.3)
BUN SERPL-MCNC: 22.1 MG/DL (ref 7–21)
CALCIUM SERPL-MCNC: 10.1 MG/DL (ref 8.5–10.1)
CHLORIDE SERPLBLD-SCNC: 105.8 MMOL/L (ref 98–110)
CO2 SERPL-SCNC: 26 MMOL/L (ref 20–32)
CREAT SERPL-MCNC: 1.4 MG/DL (ref 0.7–1.3)
GFR SERPL CREATININE-BSD FRML MDRD: 53.1 ML/MIN/1.7 M2
GLUCOSE SERPL-MCNC: 115.4 MG'DL (ref 70–99)
POTASSIUM SERPL-SCNC: 4.2 MMOL/DL (ref 3.2–4.6)
PROT SERPL-MCNC: 7.9 G/DL (ref 6.8–8.8)
SODIUM SERPL-SCNC: 142.1 MMOL/L (ref 132–142)

## 2019-03-22 RX ORDER — BENZOCAINE/MENTHOL 6 MG-10 MG
LOZENGE MUCOUS MEMBRANE 2 TIMES DAILY
Qty: 30 G | Refills: 0 | Status: SHIPPED | OUTPATIENT
Start: 2019-03-22 | End: 2020-05-14

## 2019-03-22 NOTE — LETTER
March 27, 2019      Srikanth Graves  [address] [place] [address]      Dear Srikanth Graves,     I attempted to call to discuss lab results however no one answered. The results from your labs demonstrate elevated blood sugar which could be a sign of diabetes. You also have worsening kidney function compared to prior lab tests. I would like you to make an appointment to discuss this further with your primary physician in the next few weeks.       Thank you for allowing me to be a part of your health care!     Resulted Orders   Comprehensive Metabolic Panel ([place])   Result Value Ref Range    Albumin 4.4 3.3 - 4.9 mg/dL    Alkaline Phosphatase 74.2 40.0 - 150.0 U/L    ALT 20.2 0.0 - 45.0 U/L    AST 20.6 0.0 - 55.0 U/L    Bilirubin Total 0.4 0.2 - 1.3 mg/dL    Urea Nitrogen 22.1 (H) 7.0 - 21.0 mg/dL    Calcium 10.1 8.5 - 10.1 mg/dL    Chloride 105.8 98.0 - 110.0 mmol/L    Carbon Dioxide 26.0 20.0 - 32.0 mmol/L    Creatinine 1.4 (H) 0.7 - 1.3 mg/dL    Glucose 115.4 (H) 70.0 - 99.0 mg'dL    Potassium 4.2 3.2 - 4.6 mmol/dL    Sodium 142.1 (H) 132.0 - 142.0 mmol/L    Protein Total 7.9 6.8 - 8.8 g/dL    GFR Estimate 53.1 (L) >60.0 mL/min/1.7 m2    GFR Estimate If Black 64.2 >60.0 mL/min/1.7 m2   Hepatits C RNA by RT-PCR ([facility])   Result Value Ref Range    HCV RNA QUANT HCV RNA Not Detected HCVND [IU]/mL      Comment:      The JOSE AmpliPrep/JOSE TaqMan HCV Test is an FDA-approved in vitro nucleic  acid amplification test for the quantitation of HCV RNA in human plasma (ETDA  plasma) or serum using the JOSE AmpliPrep Instrument for automated viral  nucleic acid extraction and the JOSE TaqMan Analyzer or JOSE TaqMan for  automated Real Time PCR amplification and detection of the viral nucleic acid  target.  Titer results are reported in International Units/mL (IU/mL) using the 1st WHO  International standard for HCV for Nucleic Acid Amplification based assays.      LOG OF HCV RNA QT Not Calculated <1.2 Log  IU/mL      Comment:      PERFORMED AT  [facility]  [address] [place] [address]      Narrative    Test performed by:  [facility] [address], [place] [address]       If you have any questions, please call the clinic to make an appointment.    Sincerely,    Coleen Miller MD

## 2019-03-22 NOTE — PATIENT INSTRUCTIONS
3/22/2019  Srikanth Graves    It was a pleasure to see you today at [facility].     My recommendations after this visit include:   1. 2 (500mg) tylenol every 8 hours for pain (NO MORE)  2. 1-2 week recheck  3. Steroid creams    Thank you for allowing me to be a part of your health care team!    Sincerely,   Dr. Miller

## 2019-03-22 NOTE — PROGRESS NOTES
Preceptor attestation:  Vital signs reviewed: /73   Pulse 62   Temp 98  F (36.7  C) (Oral)   Resp 14   Wt 84.8 kg (187 lb)   SpO2 95%   BMI 31.73 kg/m      Patient seen, evaluated, and discussed with the resident.  I have verified the content of the note, which accurately reflects my assessment of the patient and the plan of care.    Supervising physician: Roz Burnham MD  [facility]

## 2019-03-22 NOTE — PROGRESS NOTES
"S: Srikanth Graves is a 73 year old male with a PMH of   Patient Active Problem List   Diagnosis     Other constipation     Advance care planning     Bunion of great toe     Degeneration of cervical intervertebral disc     Chronic airway obstruction (H)     Chronic low back pain     Chronic pain disorder     Chronic obstructive pulmonary disease (H)     Coronary artery disease of native artery of native heart with stable angina pectoris (H)     Depression     DJD (degenerative joint disease), ankle and foot     Dyspnea on exertion     Edema     Esophageal reflux     Essential hypertension     Headache disorder     Peripheral vascular disease (H)     Primary open angle glaucoma of right eye, mild stage     Vitamin D deficiency     Cocaine abuse in remission (H)     Coccydynia     Clavus     Eye globe prosthesis     Hallux valgus, acquired     Neck pain     Routine adult health maintenance     Controlled substance agreement terminated     Testicular cyst     presenting to clinic today with a chief complaint of sore spots on top of his head. He reports nerve pain up his neck and to his head. He has tried \"nerve pill\" which helped a little. Does not know what this pill is called.  Patient also reports sore spots on his head have worsened since recent [id number] trip where his hair was cut short.    Patient is also complaining of right sided pain in his back and flanks which he describes as \"body punches\", nothing improves, nothing makes it worse. Reports it is always there and rates as a 7-9/10 pain.  Patient reports that this pain has been ongoing for well over one year.  He reports taking tylenol every day for pain and states that he takes 8-9 pills per day.  Patient reports that he has been unable to finish workup for the back pain as he was supposed to have an MRI however was told he could not do this due to a stent of unknown metal. Denies incontinence of urine or stool. Denies saddle paraesthesias. "       ROS:  General: No fevers, chills. No weight loss  Head: no trauma  CV: No chest pain  Resp: No shortness of breath.   GI: No nausea, vomiting, constipation, diarrhea  : No urinary pains    Current Outpatient Medications   Medication Sig Dispense Refill     acetaminophen (TYLENOL) 500 MG tablet Take 1 tablet (500 mg) by mouth every 8 hours as needed for mild pain 100 tablet 1     albuterol (PROAIR HFA/PROVENTIL HFA/VENTOLIN HFA) 108 (90 BASE) MCG/ACT Inhaler INHALE TWO PUFFS BY MOUTH FOUR TIMES A DAY AS NEEDED 3 Inhaler 3     amLODIPine (NORVASC) 10 MG tablet Take 1 tablet (10 mg) by mouth daily 90 tablet 3     aspirin 81 MG tablet Take 1 tablet (81 mg) by mouth daily 90 tablet 3     Cholecalciferol (VITAMIN D) 2000 UNITS tablet Take 2,000 Units by mouth daily 100 tablet 3     clopidogrel (PLAVIX) 75 MG tablet Take 1 tablet (75 mg) by mouth daily 90 tablet 3     docusate sodium (COLACE) 100 MG tablet Take 100 mg by mouth 2 times daily as needed for constipation 60 tablet 11     dorzolamide-timolol (COSOPT) 2-0.5 % ophthalmic solution Place 1 drop into the right eye twice daily per optometrist 1 Bottle 11     furosemide (LASIX) 20 MG tablet Take 1 tablet (20 mg) by mouth daily 90 tablet 3     hydrALAZINE (APRESOLINE) 10 MG tablet Take 1 tablet (10 mg) by mouth 2 times daily 180 tablet 3     hydrocortisone (CORTAID) 1 % external cream Apply topically 2 times daily 30 g 0     lisinopril (PRINIVIL/ZESTRIL) 40 MG tablet Take 1 tablet (40 mg) by mouth daily 90 tablet 3     metoprolol tartrate (LOPRESSOR) 100 MG tablet Take 0.5 tablets (50 mg) by mouth 2 times daily 90 tablet 3     mometasone-formoterol (DULERA) 200-5 MCG/ACT oral inhaler INHALE TWO PUFFS BY MOUTH TWICE A DAY 3 Inhaler 3     Multiple Vitamin (TAB-A-MARZENA) TABS Take 1 tablet by mouth daily 90 tablet 3     nitroGLYcerin (NITROLINGUAL) 0.4 MG/SPRAY spray For chest pain spray 1 spray under tongue every 5 minutes for 3 doses. If symptoms persist 5 minutes  after 1st dose call 911. 4.9 g 3     polyvinyl alcohol (LIQUIFILM TEARS) 1.4 % ophthalmic solution Place 1 drop into the right eye as needed for dry eyes 15 mL 3     ranitidine (ZANTAC) 150 MG tablet Take 1 tablet (150 mg) by mouth daily 90 tablet 3     sertraline (ZOLOFT) 50 MG tablet Take 1 tablet (50 mg) by mouth daily 90 tablet 3     Skin Protectants, Misc. (HYDROCERIN) CREA Apply topically 3 times daily as needed for dry skin 228 g 3     terazosin (HYTRIN) 2 MG capsule Take 1 capsule (2 mg) by mouth At Bedtime 30 capsule 11     tiotropium (SPIRIVA) 18 MCG capsule Inhale 1 capsule (18 mcg) into the lungs daily 90 capsule 3     traZODone (DESYREL) 50 MG tablet Take 1/2 to 2 tablets by mouth at bedtime as needed for sleep. 180 tablet 3     bisacodyl (BISACODYL LAXATIVE) 5 MG EC tablet Take 2 tablets (10mg) as directed. 2 tablet 0     magnesium citrate solution Magnesium citrate 10 oz as directed. NO red liquids. 296 mL 0     polyethylene glycol (MIRALAX) packet Miralax powder 8.3 oz bottle (238 gm) as directed 238 g 0       O: /73   Pulse 62   Temp 98  F (36.7  C) (Oral)   Resp 14   Wt 84.8 kg (187 lb)   SpO2 95%   BMI 31.73 kg/m     Gen:  Well nourished and in No acute distress   CV:  RRR  - no murmurs noted   Pulm:  CTAB, no wheezes or crackles noted, good air entry   ABD: soft, nontender, no masses, no rebound, BS intact throughout, no hepatosplenomegaly  MS: tender to palpation on right paraspinal musculature, ROM symmetric with rotation, flexion and extension.   Neuro: gait normal. No paresthesias.   Psych: Euthymic   Skin: oily scalp with small hyperpigmented areas around hair follicles. No purulence, no fluctuance and non tender to palpation.      Assessment and Plan:  Srikanth was seen today for musculoskeletal problem.    Diagnoses and all orders for this visit:    Chronic right-sided thoracic back pain  -     Comprehensive Metabolic Panel ([place] [facility])  -     Hepatits C RNA by RT-PCR  ([facility])  Comment: Patient is taking large amounts of Tylenol daily and likely overtreating toxicity.  Due to patient taking this amount of Tylenol every day we will obtain a CMP to check liver function.  Due to distribution of pain and unclear if patient has had prior hepatitis C screening, we will perform this today.  Patient may have capsular liver pain causing referred pain into his back.  The longevity of this back pain and history of CT scan demonstrating degenerative changes which was positive followed up with an MRI, this is likely musculoskeletal chronic back pain however due to unknown kind of stent, patient is not a candidate for an MRI this.  Discussed with patient we will continue supportive management with ice, heat, refills on his topical creams plan to return to discuss labs.    Scalp irritation  -     hydrocortisone (CORTAID) 1 % external cream; Apply topically 2 times daily  Comment: This is likely a dermatitis secondary to folliculitis from haircut/close shaves. Recommend steroid cream for itching and stop close shaving of head. No sign of fungal infection or bacterial infection.    This patient was seen and discussed with Dr. Burnham who agrees with the assessment and plan.     Coleen Miller, DO  PGY3

## 2019-03-25 ENCOUNTER — ALLIED HEALTH/NURSE VISIT (OUTPATIENT)
Dept: FAMILY MEDICINE | Facility: CLINIC | Age: 74
End: 2019-03-25
Payer: MEDICARE

## 2019-03-25 DIAGNOSIS — Z12.11 SPECIAL SCREENING FOR MALIGNANT NEOPLASMS, COLON: Primary | ICD-10-CM

## 2019-03-25 LAB
HCV RNA QUANT: NORMAL [IU]/ML
LOG OF HCV RNA QT: NORMAL LOG IU/ML

## 2019-03-25 RX ORDER — BISACODYL 5 MG/1
TABLET, DELAYED RELEASE ORAL
Qty: 2 TABLET | Refills: 0 | Status: SHIPPED | OUTPATIENT
Start: 2019-03-25 | End: 2020-07-24

## 2019-03-25 RX ORDER — POLYETHYLENE GLYCOL 3350 17 G/17G
POWDER, FOR SOLUTION ORAL
Qty: 238 G | Refills: 0 | Status: SHIPPED | OUTPATIENT
Start: 2019-03-25 | End: 2020-07-24

## 2019-03-25 NOTE — NURSING NOTE
Purchase these items at least 2 days before your colonoscopy.                           Miralax (polyethylene glycol)                  Dulcolax (bisacodyl)  8.3 oz powder (generic is OK)          5 mg tablets (generic is OK)                                  Gatorade                                                    Magnesium citrate  64 oz (two 32 oz) - any color except red          10 oz, any color except red     Day Before  Only take a clear liquid diet.  Noon: Take 2 tablets of dulcolax.    Between 4 and 6 PM: In a large container/pitcher, mix entire bottle (8.3 oz) of Miralax together with both bottles (64 oz) of Gatorade (cannot be red!).  Drink 1 (8 oz) glass every 15 minutes until it's gone.  This will take about 2 hours to complete.     Day Of Exam  4 hours before exam: Drink entire bottle of magnesium citrate.    3 hours before exam: Stop drinking any liquids.  No eating food.  No gum, tobacco, or hard candy.       Patient presented to clinic with questions about his prep.  Patient is scheduled at [facility] on March 27 th at 315 pm.  Patient is on plavix.  RN called over to [facility] to check if that needs to be held.  [facility] RN stated that it does not need to be held.  Reviewed above colonscopy prep with patient.  Prescriptions for above sent to [facility] per patient request.  AYLIN Topete RN

## 2019-03-25 NOTE — PATIENT INSTRUCTIONS
Purchase these items at least 2 days before your colonoscopy.                           Miralax (polyethylene glycol)                  Dulcolax (bisacodyl)  8.3 oz powder (generic is OK)          5 mg tablets (generic is OK)                                  Gatorade                                                    Magnesium citrate  64 oz (two 32 oz) - any color except red          10 oz, any color except red     Day Before  Only take a clear liquid diet.  Noon: Take 2 tablets of dulcolax.    Between 4 and 6 PM: In a large container/pitcher, mix entire bottle (8.3 oz) of Miralax together with both bottles (64 oz) of Gatorade (cannot be red!).  Drink 1 (8 oz) glass every 15 minutes until it's gone.  This will take about 2 hours to complete.     Day Of Exam  4 hours before exam: Drink entire bottle of magnesium citrate.    3 hours before exam: Stop drinking any liquids.  No eating food.  No gum, tobacco, or hard candy.         LALA Murphy RN

## 2019-03-26 NOTE — RESULT ENCOUNTER NOTE
{Please send letter to patient with lab results.    Dear Srikanth Graves,     I attempted to call to discuss lab results however no one answered. The results from your labs demonstrate elevated blood sugar which could be a sign of diabetes. You also have worsening kidney function compared to prior lab tests. I would like you to make an appointment to discuss this further with your primary physician in the next few weeks.      Thank you for allowing me to be a part of your health care!    Sincerely,   Dr. Miller  3/26/2019

## 2019-03-27 ENCOUNTER — ALLIED HEALTH/NURSE VISIT (OUTPATIENT)
Dept: FAMILY MEDICINE | Facility: CLINIC | Age: 74
End: 2019-03-27
Payer: MEDICARE

## 2019-03-27 DIAGNOSIS — Z12.11 SPECIAL SCREENING FOR MALIGNANT NEOPLASMS, COLON: Primary | ICD-10-CM

## 2019-03-27 NOTE — NURSING NOTE
Spoke with patient who stated that when he went to  his colon prep at [facility] on Monday that they stated it was not there.  Per EHR:   E-Prescribing Status: Receipt confirmed by pharmacy (3/25/2019  9:38 AM CDT) for Bisacodyl laxative, miralax and magnesium citrate.  RN had placed orders on Monday around 935 am when patient came in for colonoscopy prep instructions.    Today- RN called over to [facility] and they do have the order and it's ready for patient to .  Instructed patient that he will need to call [facility] and reschedule his appointment as he was scheduled to have the colonoscopy today.  Instructed also that his prep medication is avail alexa and ready to  at [facility].      Routed to Dr. Miller/LALA Murphy RN

## 2019-04-03 ENCOUNTER — OFFICE VISIT (OUTPATIENT)
Dept: FAMILY MEDICINE | Facility: CLINIC | Age: 74
End: 2019-04-03
Payer: MEDICARE

## 2019-04-03 ENCOUNTER — HOSPITAL ENCOUNTER (OUTPATIENT)
Facility: CLINIC | Age: 74
End: 2019-04-03
Attending: INTERNAL MEDICINE | Admitting: INTERNAL MEDICINE
Payer: MEDICARE

## 2019-04-03 VITALS
DIASTOLIC BLOOD PRESSURE: 75 MMHG | HEART RATE: 62 BPM | SYSTOLIC BLOOD PRESSURE: 128 MMHG | TEMPERATURE: 98.5 F | OXYGEN SATURATION: 97 % | WEIGHT: 188 LBS | RESPIRATION RATE: 14 BRPM | BODY MASS INDEX: 31.9 KG/M2

## 2019-04-03 DIAGNOSIS — R06.09 DYSPNEA ON EXERTION: ICD-10-CM

## 2019-04-03 DIAGNOSIS — E55.9 VITAMIN D DEFICIENCY: ICD-10-CM

## 2019-04-03 DIAGNOSIS — N18.30 CHRONIC KIDNEY DISEASE, STAGE 3 (MODERATE): Primary | ICD-10-CM

## 2019-04-03 DIAGNOSIS — I10 ESSENTIAL HYPERTENSION: ICD-10-CM

## 2019-04-03 LAB
BASOPHILS # BLD AUTO: 0.1 THOU/UL (ref 0–0.2)
BASOPHILS NFR BLD AUTO: 1 % (ref 0–2)
BUN SERPL-MCNC: 22.9 MG/DL (ref 7–21)
CALCIUM SERPL-MCNC: 9.9 MG/DL (ref 8.5–10.1)
CHLORIDE SERPLBLD-SCNC: 106.7 MMOL/L (ref 98–110)
CO2 SERPL-SCNC: 33.4 MMOL/L (ref 20–32)
CREAT SERPL-MCNC: 1.5 MG/DL (ref 0.7–1.3)
EOSINOPHIL # BLD AUTO: 0.3 THOU/UL (ref 0–0.4)
EOSINOPHIL NFR BLD AUTO: 6 % (ref 0–6)
ERYTHROCYTE [DISTWIDTH] IN BLOOD BY AUTOMATED COUNT: 13.2 % (ref 11–14.5)
GFR SERPL CREATININE-BSD FRML MDRD: 49 ML/MIN/1.7 M2
GLUCOSE SERPL-MCNC: 91 MG'DL (ref 70–99)
HCT VFR BLD AUTO: 42.7 % (ref 40–54)
HGB BLD-MCNC: 13.9 G/DL (ref 14–18)
LYMPHOCYTES # BLD AUTO: 1.8 THOU/UL (ref 0.8–4.4)
LYMPHOCYTES NFR BLD AUTO: 36 % (ref 20–40)
MAGNESIUM SERPL-MCNC: 1.9 MG/DL (ref 1.8–2.6)
MCH RBC QN AUTO: 29.4 PG (ref 27–34)
MCHC RBC AUTO-ENTMCNC: 32.6 G/DL (ref 32–36)
MCV RBC AUTO: 90 FL (ref 80–100)
MONOCYTES # BLD AUTO: 0.6 THOU/UL (ref 0–0.9)
MONOCYTES NFR BLD AUTO: 11 % (ref 2–10)
NEUTROPHILS # BLD AUTO: 2.3 THOU/UL (ref 2–7.7)
NEUTROPHILS NFR BLD AUTO: 46 % (ref 50–70)
PLATELET # BLD AUTO: 203 THOU/UL (ref 140–440)
PMV BLD AUTO: 10.1 FL (ref 8.5–12.5)
POTASSIUM SERPL-SCNC: 3.9 MMOL/DL (ref 3.2–4.6)
RBC # BLD AUTO: 4.73 MILL/UL (ref 4.4–6.2)
SODIUM SERPL-SCNC: 144.7 MMOL/L (ref 132–142)
TSH SERPL DL<=0.05 MIU/L-ACNC: 0.7 UIU/ML (ref 0.3–5)
WBC # BLD AUTO: 5.1 THOU/UL (ref 4–11)

## 2019-04-03 NOTE — PROGRESS NOTES
.S: Srikanth Graves is a 73 year old male who returns for follow up of  Labs result , mostly had decrease kidney function   2 abdominal/ back pain for long  time  Due to have colonoscopy after positive FIT   3 Hx of hypokalemia  Off potasium  Replacement    Patients states that main concern today is  Follow-up labs    PMHX/PSHX/MEDS/ALLERGIES/SHX/FHX reviewed and updated in Epic.      ROS:  General: No fevers, chills  Head: No headache  Ears: No acute change in hearing.    CV: No chest pain or palpitations.  Resp: No shortness of breath.  No cough. No hemoptysis.  GI: No nausea, vomiting, constipation, diarrhea  : No urinary pains    O: /75   Pulse 62   Temp 98.5  F (36.9  C) (Oral)   Resp 14   Wt 85.3 kg (188 lb)   SpO2 97%   BMI 31.90 kg/m     Gen:  Well nourished and in NAD  HEENT: PERRLA; TMs normal color and landmarks; nasopharynx pink and moist; oropharynx pink and moist  Neck: supple without lymphadenopathy  CV:  RRR  - no murmurs, rubs, or gallups,   Pulm:  CTAB, no wheezes/rales/rhonchi, good air entry   ABD: soft, nontender, no masses, no rebound, BS intact throughout  Extrem: no cyanosis, edema or clubbing  Psych: Euthymic      1 HTN in target   2 CKD stage 3 declining    BMP lipids     vitamin d   TSH  3 Abdominal pain/right sided     CT abdomen and pelvis  After colonoscopy   tylenol as needed  .  RTC after colonoscopy  or sooner if develops new or worsening symptoms.    Rohit Pastor

## 2019-04-03 NOTE — PATIENT INSTRUCTIONS
Will recheck kidney and lab work today   will have cat scan  abdomen and pelvis after colonoscopy and kidney lab checks

## 2019-04-03 NOTE — LETTER
April 5, 2019      Srikanth Star  [address] [place] [address]        Dear Srikanth,  There is no anemia, thyroid is normal, vitamin d is normal, potasium and magnesium are normal    kidneys function is declining but stable, will recheck in 4 to 6 weeks     Please see below for your test results.    Resulted Orders   Basic Metabolic Panel ([facility])   Result Value Ref Range    Urea Nitrogen 22.9 (H) 7.0 - 21.0 mg/dL    Calcium 9.9 8.5 - 10.1 mg/dL    Chloride 106.7 98.0 - 110.0 mmol/L    Carbon Dioxide 33.4 (H) 20.0 - 32.0 mmol/L    Creatinine 1.5 (H) 0.7 - 1.3 mg/dL    Glucose 91.0 70.0 - 99.0 mg'dL    Potassium 3.9 3.2 - 4.6 mmol/dL    Sodium 144.7 (H) 132.0 - 142.0 mmol/L    GFR Estimate 49.0 (L) >60.0 mL/min/1.7 m2    GFR Estimate If Black 59.3 (L) >60.0 mL/min/1.7 m2   CBC w/ Diff and Plt ([facility])   Result Value Ref Range    WBC 5.1 4.0 - 11.0 thou/uL    RBC 4.73 4.40 - 6.20 mill/uL    Hemoglobin 13.9 (L) 14.0 - 18.0 g/dL    Hematocrit 42.7 40.0 - 54.0 %    MCV 90 80 - 100 fL    MCH 29.4 27.0 - 34.0 pg    MCHC 32.6 32.0 - 36.0 g/dL    RDW 13.2 11.0 - 14.5 %    Platelets 203 140 - 440 thou/uL    Mean Platelet Volume 10.1 8.5 - 12.5 fL    % Neutrophils 46 (L) 50 - 70 %    % Lymphocytes 36 20 - 40 %    % Monocytes 11 (H) 2 - 10 %    % Eosinophils 6 0 - 6 %    % Basophils 1 0 - 2 %    Neutrophils (Absolute) 2.3 2.0 - 7.7 thou/uL    Lymphs (Absolute) 1.8 0.8 - 4.4 thou/uL    Monocytes(Absolute) 0.6 0.0 - 0.9 thou/uL    Eos (Absolute) 0.3 0.0 - 0.4 thou/uL    Baso (Absolute) 0.1 0.0 - 0.2 thou/uL    Narrative    Test performed by:  [facility]  [address] [place] [address]   Thyroid [place] ([facility])   Result Value Ref Range    TSH 0.70 0.30 - 5.00 uIU/mL    Narrative    Test performed by:  [facility]  [address] [place] [address]   Vitamin D 25-Hydroxy ([facility])   Result Value Ref Range    Vitamin D,25-Hydroxy 43.9 30.0 - 80.0 ng/mL    Narrative    Test  performed by:  [facility] [address] [place] [address]  Deficiency <10.0 ng/mL  Insufficiency 10.0-29.9 ng/mL  Sufficiency 30.0-80.0 ng/mL  Toxicity (possible) >100.0 ng/mL   Magnesium ([facility])   Result Value Ref Range    Magnesium 1.9 1.8 - 2.6 mg/dL    Narrative    Test performed by:  [facility] [address] [place] [address]       If you have any questions, please call the clinic to make an appointment.    Sincerely,    Rohit Pastor MD

## 2019-04-04 LAB — 25(OH)D3 SERPL-MCNC: 43.9 NG/ML (ref 30–80)

## 2019-04-08 DIAGNOSIS — Z29.9 PREVENTIVE MEASURE: ICD-10-CM

## 2019-04-09 RX ORDER — CHOLECALCIFEROL (VITAMIN D3) 50 MCG
2000 TABLET ORAL DAILY
Qty: 100 TABLET | Refills: 3 | Status: SHIPPED | OUTPATIENT
Start: 2019-04-09 | End: 2020-07-24

## 2019-04-17 NOTE — PROGRESS NOTES
Visit to the client's home for annual health risk assessment.  An  was present.    Current situation/living environment  Client currently lives on the ground floor of a one bedroom apartment complex.  His home is cluttered but able to move around without issues.  He was away from the home when CM arrived.  Waited outside while he arrived by car which he was driving.  He reports that he does have a bus pass too that he will use to get around the community.  He reports that he is not happy with his housing and has a Section 8 voucher and continues to look for other places to live.  He is very picky on which area of town that he wants to live.  Most parts of [facility] are off limits because he has history with a lot of the neighborhoods.  He manages his own finances.  Client qualifies [facility] program because he has Medicare.  He has declined at this time because he is fine with his current insurance.  Tried to explain the [facility] plan in detail and it is a better plan but do not feel he understood all of what it is offered.  Will try again at a later time.      Activities of daily living (ADL)/instrumental activities of daily living (IADL) and functional issues  Client needs help with the following ADL's: dressing and bathing  Client needs help with the following IADL's: shopping, cooking, housekeeping and laundry  Client states he is unable to perform the above due to SOB and pain.    Client has declined dental.  He is planning to schedule a vision exam because he feels he has an infection in his eye.  He canceled his MOM's meal and Anni, CADI worker, is going to send him out information on [employer]. Client currently has PCA to help with personal needs.  CADI worker is going to add homemaking hours.       Health concerns for today  Client reports that his main health concerns are his ongoing headache and chronic pain.  He feels like his pain is not being managed because he has tried everything  and nothing works.  He has been to a pain clinic and this was not helpful.  He said that he was prescribed a nerve pain medication that did help but can not remember where he got it from so he can order more.  He will discuss this with MD at next appt.  He is going to do a walk in at the clinic this week to discuss his head pain.  He has a colonoscopy scheduled for next week.  Reviewed medications and he is compliant.  He has had no falls or ED/hospitalizations. Client said he did get his BP machine.     Has patient fallen 2 or more times in the last year? No  Has patient fallen with injury in the last year? No    Cognition/mental health  Client reports that his memory is good with no issues.  He has depression and takes medications.  No concerns at this time.      STARS/Med Adherence  Client is non-compliant with the following quality measures: none      Client's Plan of Care consists of:  Homemaker services:  CADI worker is going to start hours and Personal care assistance (PCA) (3.5 hours per day).  CADI worker to send out information on [employer] meals.

## 2019-04-26 ENCOUNTER — OFFICE VISIT (OUTPATIENT)
Dept: PHARMACY | Facility: CLINIC | Age: 74
End: 2019-04-26
Payer: MEDICARE

## 2019-04-26 VITALS
TEMPERATURE: 98.3 F | DIASTOLIC BLOOD PRESSURE: 63 MMHG | BODY MASS INDEX: 31.9 KG/M2 | SYSTOLIC BLOOD PRESSURE: 143 MMHG | RESPIRATION RATE: 16 BRPM | HEART RATE: 63 BPM | WEIGHT: 188 LBS | OXYGEN SATURATION: 96 %

## 2019-04-26 DIAGNOSIS — I10 ESSENTIAL HYPERTENSION: Primary | ICD-10-CM

## 2019-04-26 DIAGNOSIS — K21.9 GASTROESOPHAGEAL REFLUX DISEASE, ESOPHAGITIS PRESENCE NOT SPECIFIED: ICD-10-CM

## 2019-04-26 DIAGNOSIS — I25.118 CORONARY ARTERY DISEASE OF NATIVE ARTERY OF NATIVE HEART WITH STABLE ANGINA PECTORIS (H): ICD-10-CM

## 2019-04-26 DIAGNOSIS — K59.09 OTHER CONSTIPATION: ICD-10-CM

## 2019-04-26 RX ORDER — CLOPIDOGREL BISULFATE 75 MG/1
75 TABLET ORAL DAILY
Qty: 90 TABLET | Refills: 3 | Status: SHIPPED | OUTPATIENT
Start: 2019-04-26 | End: 2019-10-24

## 2019-04-26 RX ORDER — ASPIRIN 81 MG
100 TABLET, DELAYED RELEASE (ENTERIC COATED) ORAL 2 TIMES DAILY PRN
Qty: 180 TABLET | Refills: 3 | Status: SHIPPED | OUTPATIENT
Start: 2019-04-26 | End: 2020-06-05

## 2019-04-26 NOTE — PROGRESS NOTES
Patient came in frustrated that he seems to never be able to get his medications on time, and that they are also not synced up.     Called [facility] at the specialty clinic and they said that they have requested refills on clopidogrel, docusate and ranitidine from us and have not heard back yet.  I did not see refill requests in our chart.  It is then when I realized that as Dr Pastor also practices at the [facility], which is a [facility], so that is where pharmacy is probably sending the prescription requests.  Informed pharmacy of this and they updated Dr. Pastor's fax number to our fax number.  Additionally, I sent in the refills for the three requested medications, plus aspirin.    As patient also desired his meds to be synced - I spoke to that department ([phone]).  The following medications were already on his sync list:    Amlodipine    Furosemide    Lisinopril    Metoprolol     Dulera    Sertraline    Terazosin    Spiriva    Additionally, they had ISMN ER 120mg daily and KCl 10mEq -take 2 daily on his sync list.  Told them to remove those.  I am not concerned about the KCl because he has not gotten that since 2018, but he could still be on the ISMN as he last got 34 tablets on 3/21/19.    I asked to have the following medications put on sync:    Aspirin (I had to send a prescription)    Cholecalciferol    Hydralazine     I am not sure that he is actually using the aspirin or cholecalciferol because they have not been filled since before January.  He could be taking the hydralazine because he got a 90 day supply on 2/13/19.    Requested that patient make an appointment with me in 1 week and bring all of his medicines.  I would like to make sure that we get everything squared away.  He is also c/o spots on his head that he was given a steroid cream for in the past.  He would like to have that looked at next week.      Mansi CameronD.

## 2019-05-03 ENCOUNTER — OFFICE VISIT (OUTPATIENT)
Dept: PHARMACY | Facility: CLINIC | Age: 74
End: 2019-05-03
Payer: MEDICARE

## 2019-05-03 ENCOUNTER — OFFICE VISIT (OUTPATIENT)
Dept: FAMILY MEDICINE | Facility: CLINIC | Age: 74
End: 2019-05-03
Payer: MEDICARE

## 2019-05-03 VITALS
TEMPERATURE: 98.7 F | BODY MASS INDEX: 31.49 KG/M2 | RESPIRATION RATE: 20 BRPM | HEIGHT: 65 IN | SYSTOLIC BLOOD PRESSURE: 114 MMHG | OXYGEN SATURATION: 96 % | WEIGHT: 189 LBS | DIASTOLIC BLOOD PRESSURE: 70 MMHG | HEART RATE: 62 BPM

## 2019-05-03 DIAGNOSIS — I10 ESSENTIAL HYPERTENSION: Primary | ICD-10-CM

## 2019-05-03 DIAGNOSIS — K21.9 GASTROESOPHAGEAL REFLUX DISEASE, ESOPHAGITIS PRESENCE NOT SPECIFIED: ICD-10-CM

## 2019-05-03 DIAGNOSIS — L21.9 SEBORRHEIC DERMATITIS OF SCALP: Primary | ICD-10-CM

## 2019-05-03 RX ORDER — KETOCONAZOLE 20 MG/ML
SHAMPOO TOPICAL DAILY
Qty: 120 ML | Refills: 1 | Status: SHIPPED | OUTPATIENT
Start: 2019-05-03 | End: 2020-04-02

## 2019-05-03 RX ORDER — FLUOCINONIDE TOPICAL SOLUTION USP, 0.05% 0.5 MG/ML
SOLUTION TOPICAL 2 TIMES DAILY
Qty: 60 ML | Refills: 0 | Status: SHIPPED | OUTPATIENT
Start: 2019-05-03 | End: 2019-10-11

## 2019-05-03 ASSESSMENT — ACTIVITIES OF DAILY LIVING (ADL)
RETIRED_EATING: 0-->INDEPENDENT
RETIRED_COMMUNICATION: 0-->UNDERSTANDS/COMMUNICATES WITHOUT DIFFICULTY
NUMBER_OF_TIMES_PATIENT_HAS_FALLEN_WITHIN_LAST_SIX_MONTHS: 1
BATHING: 2-->ASSISTIVE PERSON
TRANSFERRING: 2-->ASSISTIVE PERSON
TOILETING: 2-->ASSISTIVE PERSON
SWALLOWING: 0-->SWALLOWS FOODS/LIQUIDS WITHOUT DIFFICULTY
FALL_HISTORY_WITHIN_LAST_SIX_MONTHS: YES
AMBULATION: 2-->ASSISTIVE PERSON
DRESS: 2-->ASSISTIVE PERSON
COGNITION: 1 - ATTENTION OR MEMORY DEFICITS

## 2019-05-03 ASSESSMENT — MIFFLIN-ST. JEOR: SCORE: 1534.18

## 2019-05-03 ASSESSMENT — PAIN SCALES - GENERAL: PAINLEVEL: MODERATE PAIN (4)

## 2019-05-03 NOTE — PROGRESS NOTES
Medication Management Follow Up Note                                                       I am seeing Srikanth Graves for follow up from a previous visit with the pharmacy team.      Medication Discrepancies  Medications (including OTCs and supplements) on EMR med list that pt is NOT taking:  none  Medications pt IS taking that are NOT on EMR med list (e.g., from specialist, hospital): none  Dosage or frequency listed differently than how patient is taking: none  Duplicate medication on list (two occurrences of the same medication):  none  TOTAL NUMBER OF MEDICATION DISCREPANCIES:  0    Subjective                                                       Patient reports the following problems or concerns with their medications:  none  Patient reports the following adverse reactions to medications:  none  Pt reports missing doses:  never  Additional subjective information (e.g., reason for visit, frequency of PRNs, reasons meds were D/C ed):    Patient did not bring in his medications as was requested last week.     Patient claimed the list we provided on the AVS is accurate.     Patient requested a refill on his pain medication. He said it was not Tylenol but said it was 500mg every 8 hours which is the dose and instructions for acetaminophen.    Feels confident in taking his medications accurately. Has his medications set up by his bed and takes them every morning and evening.    Takes his morning meds with carrot juice (fiber) which prevents him from feeling nauseous if he has not eaten breakfast.    Has a PCA worker that helps him out when needed.    He appreciates that his medications are now being synched at the pharmacy.    Complained that if we send scripts electronically on Friday to his  pharmacy they aren't ready until Monday.     Patient stated he fell last week and showed us contusions on his left knee as his legs fall asleep when he sits on his tailbone.    He would like a prescription for a chair that  helps him get up from Dr. Pastor.    Used a cane to help with balance but states he leaves them places because he forgets about it.    Objective                                                       Estimated Creatinine Clearance: 44.8 mL/min (A) (based on SCr of 1.5 mg/dL (H)).    Lab Results   Component Value Date    A1C 5.7 03/07/2018     Last Comprehensive Metabolic Panel:  Sodium   Date Value Ref Range Status   04/03/2019 144.7 (H) 132.0 - 142.0 mmol/L Final     Potassium   Date Value Ref Range Status   04/03/2019 3.9 3.2 - 4.6 mmol/dL Final     Chloride   Date Value Ref Range Status   04/03/2019 106.7 98.0 - 110.0 mmol/L Final     Carbon Dioxide   Date Value Ref Range Status   04/03/2019 33.4 (H) 20.0 - 32.0 mmol/L Final     Glucose   Date Value Ref Range Status   04/03/2019 91.0 70.0 - 99.0 mg'dL Final     Urea Nitrogen   Date Value Ref Range Status   04/03/2019 22.9 (H) 7.0 - 21.0 mg/dL Final     Creatinine   Date Value Ref Range Status   04/03/2019 1.5 (H) 0.7 - 1.3 mg/dL Final     GFR Estimate   Date Value Ref Range Status   04/03/2019 49.0 (L) >60.0 mL/min/1.7 m2 Final     Calcium   Date Value Ref Range Status   04/03/2019 9.9 8.5 - 10.1 mg/dL Final       BP Readings from Last 3 Encounters:   05/03/19 114/70   04/26/19 143/63   04/03/19 128/75       The ASCVD Risk score (Monique DC Jr., et al., 2013) failed to calculate for the following reasons:    Cannot find a previous HDL lab    Cannot find a previous total cholesterol lab    PHQ-9 score:    PHQ-9 SCORE 3/1/2019   PHQ-9 Total Score 3           Assessment / Plan                                                        Updated medication list in the EMR; deleted meds patient no longer taking and added meds patient is now taking, and changed doses where there was a dose discrepancy.    Completed at this visit    Lower extremity paresthesia-  uncontrolled     MA set up an appointment with Dr. Pastor to discuss further.    Medication management -  controlled      Asked patient to check that his medications at home match what is on the AVS.    Called pharmacy to ensure new prescriptions would be ready at the pharmacy today by 4PM.      Options for treatment and/or follow-up care were reviewed with the patient.  Srikanth was engaged and actively involved in the decision making process, verbalized understanding of the options discussed, and was satisfied with the final plan.    Patient was provided with written instructions/medication list via AVS.     Follow-Up                                                         Medication issue to be addressed at a future visit      Medication management    Confirm medication list is accurate, ideally patient would bring in medications.    Dr. Griffin was provided the recommendations above  in clinic today and Dr. Burnham was available for supervision during this visit and is the authorizing prescriber for this visit through the pharmacist collaborative practice agreement.    Mely Gonzalez, PharmD Student      Drug therapy problems identified  None    # of medical conditions addressed: 2  # of medications addressed: 26  # of medication discrepancies identified: 0  # of DTP identified: 0  Time spent: 30 minutes  Level of service: 1    I was present with the pharmacy student who participated in the service and in the documentation of this note. I have verified the history, personally performed the medical decision making, and have verified the content of the note, which accurately reflects my assessment of the patient and the plan of care.   Cata Garcia, PharmD

## 2019-05-03 NOTE — PROGRESS NOTES
"Preceptor attestation:  Vital signs reviewed: /70   Pulse 62   Temp 98.7  F (37.1  C) (Oral)   Resp 20   Ht 1.651 m (5' 5\")   Wt 85.7 kg (189 lb)   SpO2 96%   BMI 31.45 kg/m      Patient seen, evaluated, and discussed with the resident.  I have verified the content of the note, which accurately reflects my assessment of the patient and the plan of care.    Supervising physician: Roz Burnham MD  [facility]    "
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
## 2019-05-03 NOTE — PATIENT INSTRUCTIONS
Dear Srikanth Graves,    It was a pleasure seeing you in clinic today.   Ketoconazole shampoo and Lidex (steroid) sent to pharmacy.  Follow up in 2 weeks if not improving.  Please do not hesitate to call or return to clinic if you have an questions or concerns.      Sincerely,    Dr. Griffin

## 2019-05-10 ENCOUNTER — TELEPHONE (OUTPATIENT)
Dept: GASTROENTEROLOGY | Facility: CLINIC | Age: 74
End: 2019-05-10

## 2019-05-10 NOTE — TELEPHONE ENCOUNTER
Order Questions     Question Answer Comment   Procedure Colonoscopy    Purpose of Procedure Screening   Preventive measure [Z29.9]  - Primary            Does the patient have the following? Chest pain or user of nitroglycerin     Congestive heart or cardiovascular problems (cardiomyopathy)    Is the patient on the following medications? NSAIDS  Plavix Instructed to hold 5 days prior to exam   Rohit Pastor MD  Referring      VM with request pt contact Endoscopy Pre-assessment RN to review upcoming procedure [facility] information.      Telephone call-back number provided. Trudy Murdock RN    Additional Information regarding appointment:   Date/Arrival time: Thursday May 16th@ 2377  Procedure Provider:  Dr. Dumont  Referring Provider. Rohit Pastor MD  Prep Type:   [x]Golytely eRx: (not sent)    Facility location:    [x][address], [address], [address]

## 2019-05-15 ENCOUNTER — TELEPHONE (OUTPATIENT)
Dept: GASTROENTEROLOGY | Facility: CLINIC | Age: 74
End: 2019-05-15

## 2019-05-15 NOTE — TELEPHONE ENCOUNTER
"Called patient to confirm colonoscopy appointment on 5/16/19 with Dr. Dumont.  RN explained that we left a voicemail for him to discuss and send in prep prescription, but we never received a call back, so prescription was never sent.  Patient stated he received a magnesium citrate and miralax prep prescription from another doctor.  RN discussed that we would have preferred him to have GoLytely prep.  RN asked patient when his last dose of Plavix was.  Patient initially said his last dose was yesterday, 5/16.  RN ended call to discuss with Dr. Dumont, who said to have patient reschedule procedure.  Upon RN call back to patient, patient stated that he hadn't filled that prescription in \"over a month\" because he didn't see it in his pill box.  RN then called pharmacy to confirm, pharmacy staff stated patient filled and took home plavix script on 4/26.  RN called patient back to review this and patient became very aggravated.  RN asked if patient could recall his last dose of Plavix and patient could not do this, stating \"it's not in my pill bag so I don't know\".  Also stated \"I don't know who's been taking the Prevacid because I didn't pick it up\".  RN explained again that Plavix was the medication I wanted to review and that given the discrepancy between the pharmacy records, the unknown date of the last Plavix dose, and the patient receiving the incorrect dose, the colonoscopy should be rescheduled.  RN reviewed several times that patient should discuss the Plavix prescription with his prescribing MD and keep track when he takes this medication, especially in preparation of procedures.  Patient became very agitated and ended call.  "

## 2019-05-16 ENCOUNTER — TELEPHONE (OUTPATIENT)
Dept: FAMILY MEDICINE | Facility: CLINIC | Age: 74
End: 2019-05-16

## 2019-05-16 DIAGNOSIS — I10 ESSENTIAL HYPERTENSION: ICD-10-CM

## 2019-05-16 DIAGNOSIS — J44.9 CHRONIC OBSTRUCTIVE PULMONARY DISEASE, UNSPECIFIED COPD TYPE (H): ICD-10-CM

## 2019-05-17 RX ORDER — TIOTROPIUM BROMIDE 18 UG/1
18 CAPSULE ORAL; RESPIRATORY (INHALATION) DAILY
Qty: 90 CAPSULE | Refills: 3 | Status: SHIPPED | OUTPATIENT
Start: 2019-05-17 | End: 2019-06-03

## 2019-05-17 RX ORDER — FUROSEMIDE 20 MG
20 TABLET ORAL DAILY
Qty: 90 TABLET | Refills: 3 | Status: SHIPPED | OUTPATIENT
Start: 2019-05-17 | End: 2020-06-05

## 2019-05-23 ENCOUNTER — TELEPHONE (OUTPATIENT)
Dept: FAMILY MEDICINE | Facility: CLINIC | Age: 74
End: 2019-05-23

## 2019-05-23 NOTE — TELEPHONE ENCOUNTER
Patient's insurance wants him to try Symbicort, Advair, Breo or Covalt.  Dulera is non-formulary.  Please send alternative.  Please advise.

## 2019-05-29 ENCOUNTER — OFFICE VISIT (OUTPATIENT)
Dept: FAMILY MEDICINE | Facility: CLINIC | Age: 74
End: 2019-05-29
Payer: MEDICARE

## 2019-05-29 VITALS
DIASTOLIC BLOOD PRESSURE: 57 MMHG | RESPIRATION RATE: 24 BRPM | OXYGEN SATURATION: 97 % | HEART RATE: 58 BPM | TEMPERATURE: 98.3 F | BODY MASS INDEX: 31.62 KG/M2 | WEIGHT: 185.2 LBS | HEIGHT: 64 IN | SYSTOLIC BLOOD PRESSURE: 131 MMHG

## 2019-05-29 DIAGNOSIS — Z23 NEED FOR VACCINATION: ICD-10-CM

## 2019-05-29 DIAGNOSIS — I25.118 CORONARY ARTERY DISEASE OF NATIVE ARTERY OF NATIVE HEART WITH STABLE ANGINA PECTORIS (H): ICD-10-CM

## 2019-05-29 DIAGNOSIS — Z97.0 EYE GLOBE PROSTHESIS: Primary | ICD-10-CM

## 2019-05-29 PROBLEM — R07.9 INTERMITTENT CHEST PAIN: Status: ACTIVE | Noted: 2018-10-20

## 2019-05-29 PROBLEM — R79.89 SERUM CREATININE RAISED: Status: ACTIVE | Noted: 2018-01-05

## 2019-05-29 PROBLEM — F33.0 MILD EPISODE OF RECURRENT MAJOR DEPRESSIVE DISORDER (H): Status: ACTIVE | Noted: 2017-08-11

## 2019-05-29 PROBLEM — M53.1 OTHER SYNDROMES AFFECTING CERVICAL REGION: Status: ACTIVE | Noted: 2019-05-29

## 2019-05-29 PROBLEM — F17.200 TOBACCO USE DISORDER: Status: ACTIVE | Noted: 2019-05-29

## 2019-05-29 PROBLEM — H25.11 NS (NUCLEAR SCLEROSIS), RIGHT: Status: ACTIVE | Noted: 2018-04-17

## 2019-05-29 LAB
CHOLEST SERPL-MCNC: 239.9 MG/DL (ref 0–200)
CHOLEST/HDLC SERPL: 5.7 {RATIO} (ref 0–5)
HBA1C MFR BLD: 5.5 % (ref 4.1–5.7)
HDLC SERPL-MCNC: 42.3 MG/DL
LDLC SERPL CALC-MCNC: 155 MG/DL (ref 0–129)
TRIGL SERPL-MCNC: 212.5 MG/DL (ref 0–150)
VLDL CHOLESTEROL: 42.5 MG/DL (ref 7–32)

## 2019-05-29 ASSESSMENT — PAIN SCALES - GENERAL: PAINLEVEL: NO PAIN (0)

## 2019-05-29 ASSESSMENT — MIFFLIN-ST. JEOR: SCORE: 1497.57

## 2019-05-29 NOTE — NURSING NOTE
Chief Complaint   Patient presents with     RECHECK     Test Results F/U      Tommie Catalan, LUTHER

## 2019-05-29 NOTE — TELEPHONE ENCOUNTER
Patient called in requesting help rescheduling his colonoscopy. He informed the call center that the Santos called him and had to reschedule because he was still taking medications that he shouldn't have been. I was unable to take the call at the time but called him back today. Informed patient that I would look into this and call him back after clarifying things.    ADDENDUM 5/29/2019 3:45 PM: Called [facility] ([phone]) to discuss a plan for patient. We determined that it would be best for patient to call and schedule so that he can speak with a nurse about the prep.  also informed me that with his cardiac conditions they would want to perform the colonoscopy in the hospital and they are booking those out to the end of July. She stated that they would call him a week prior to review the prep.    Called patient to discuss this and he is not willing to wait until July. Offered for Dr Bower to review patients chart to see if he could perform the colonoscopy. Discussed that Dr Bower's soonest appointment has a 6am arrival time which patient did not want. His next available date is 7/5. Patient does not want to wait that long so I referred patient to [facility]. Placed the referral online. Also noted that patient requested his med list be faxed over so that they can review it and tell him exactly what medications not to take.  Faxed the med list to [facility] at [phone].    Roxana Shaver

## 2019-05-29 NOTE — LETTER
May 30, 2019      Srikanth Graves  [address] [place] [address]        Dear Srikanth,  I am sending your cholesterol results. We can consider a cholesterol pill we, can talk about it in your next visit    No diabetes   Please see below for your test results.    Resulted Orders   Lipid Panel ([place])   Result Value Ref Range    Cholesterol 239.9 (H) 0.0 - 200.0 mg/dL    Cholesterol/HDL Ratio 5.7 (H) 0.0 - 5.0    HDL Cholesterol 42.3 >40.0 mg/dL    LDL Cholesterol Calculated 155 (H) 0 - 129 mg/dL    Triglycerides 212.5 (H) 0.0 - 150.0 mg/dL    VLDL Cholesterol 42.5 (H) 7.0 - 32.0 mg/dL   Hemoglobin A1c ([facility])   Result Value Ref Range    Hemoglobin A1C 5.5 4.1 - 5.7 %       If you have any questions, please call the clinic to make an appointment.    Sincerely,    Rohit Pastor MD

## 2019-05-29 NOTE — PATIENT INSTRUCTIONS
Request lift chair from case shruthi   Needs lift chair to help from sitting  to standing position   Has back and knee problem/pain

## 2019-05-29 NOTE — PROGRESS NOTES
"S: Srikanth Graves is a 74 year old male who returns for follow up of  ED on 5/21/19 visit for bleeding and discharge from left eye socket. He has prosthesis  Subsequently    opthalmology  Exam  Show joseph  prosthesis ans eye socket    She state that  there is  no bleeding from eye socket     main concern today is : request for power lift  hair    PMHX/PSHX/MEDS/ALLERGIES/SHX/FHX reviewed and updated in Epic.      ROS:  General: No fevers, chills  Head: No headache  Ears: No acute change in hearing.    CV: No chest pain or palpitations.  Resp: No shortness of breath.  No cough. No hemoptysis.  GI: No nausea, vomiting, constipation, diarrhea  : No urinary pains    O: /57   Pulse 58   Temp 98.3  F (36.8  C) (Oral)   Resp 24   Ht 1.62 m (5' 3.78\")   Wt 84 kg (185 lb 3.2 oz)   SpO2 97%   BMI 32.01 kg/m     Gen:  Well nourished and in NAD  Eyes; no obvious discharge or bleed  from left side , right side is normal  Neck: supple without lymphadenopathy  CV:  RRR  - no murmurs, rubs, or gallups,   Pulm:  CTAB, no wheezes/rales/rhonchi, good air entry   ABD: soft, nontender, no masses, no rebound, BS intact throughout  Extrem: no cyanosis, edema or clubbing  Psych: Euthymic    A/P;  1 Follow-up ED visit left ye socket /prosthesis: resolved            2 DJD: LE and back             I support power lift  Chair  to increase mobility             3Will ask him to jesus to his case manaer for required paper wok            (Z23) Need for vaccination  (primary encounter diagnosis)     Plan: ADMIN VACCINE, INITIAL, Pneumococcal vaccine 13        valent PCV13 IM (Prevnar) [70563]           4 CAD: lipid panel today will need statin other than  Lipitor  due to interaction wit angina med                           ranexa                  ASCVD: 23%        RTC: DJD  Of LE and back- paper work for power lift  chair or sooner if develops new or worsening symptoms.    Rohit Pastor"

## 2019-05-31 ENCOUNTER — TELEPHONE (OUTPATIENT)
Dept: FAMILY MEDICINE | Facility: CLINIC | Age: 74
End: 2019-05-31

## 2019-05-31 DIAGNOSIS — I25.118 CORONARY ARTERY DISEASE OF NATIVE ARTERY OF NATIVE HEART WITH STABLE ANGINA PECTORIS (H): ICD-10-CM

## 2019-05-31 DIAGNOSIS — J44.9 CHRONIC OBSTRUCTIVE PULMONARY DISEASE, UNSPECIFIED COPD TYPE (H): Primary | ICD-10-CM

## 2019-05-31 NOTE — TELEPHONE ENCOUNTER
Called pharmacy to verify, they said they still have the prescription and the patient is ok get this medication.  Pharmacy will call patient to  today.

## 2019-05-31 NOTE — TELEPHONE ENCOUNTER
[facility] phone call message- patient requesting a refill:    Full Medication Name: tiotropium mometasone-formoterol    Dose:     Pharmacy confirmed as   [facility] [place] [place] - [address] [place] [address]  Phone: [phone] Fax: [phone]  : Yes    Additional Comments: th ept called he is at the Pharmacy and they will not fill the medication and he would like to know what to do      OK to leave a message on voice mail? Yes    Primary language: English      needed? No    Call taken on May 31, 2019 at 8:41 AM by Yassine Still

## 2019-06-03 RX ORDER — ROSUVASTATIN CALCIUM 20 MG/1
20 TABLET, COATED ORAL DAILY
Qty: 90 TABLET | Refills: 3 | Status: SHIPPED | OUTPATIENT
Start: 2019-06-03 | End: 2020-06-05

## 2019-06-03 NOTE — TELEPHONE ENCOUNTER
Pharmacy Note    Dr. Pastor requested two things:    1) PA for rosuvastatin b/c of drug interaction with Ranexa with atorvastatin.  2) Figure out the confusing coverage with his inhalers/what is covered/what is not?    1) Called [employer] to get PA for rosuvastatin 20mg/d.  Approved until 12/31/19.  Sent Rx.  2) Called pharmacy- both Dulera and Spiriva are no longer covered.  Breo and Incruse are covered.  Sent Rx for both Breo (high dose ICS/LABA dose, as pt was on this dose with Dulera) and Incruse.    Called pharmacy to confirm the changes/PA above.  Asked them to educate pt on new inhaler/dosing/device inhaler technique.    Daja Luna, Pharm.D.

## 2019-06-20 ENCOUNTER — TELEPHONE (OUTPATIENT)
Dept: FAMILY MEDICINE | Facility: CLINIC | Age: 74
End: 2019-06-20

## 2019-06-25 DIAGNOSIS — L85.3 DRY SKIN: ICD-10-CM

## 2019-06-25 DIAGNOSIS — H04.123 DRY EYES: ICD-10-CM

## 2019-06-25 RX ORDER — POLYVINYL ALCOHOL 14 MG/ML
1 SOLUTION/ DROPS OPHTHALMIC PRN
Qty: 15 ML | Refills: 3 | Status: SHIPPED | OUTPATIENT
Start: 2019-06-25 | End: 2019-07-14

## 2019-06-25 RX ORDER — LANOLIN ALCOHOL/MO/W.PET/CERES
CREAM (GRAM) TOPICAL
Qty: 228 G | Refills: 3 | Status: SHIPPED | OUTPATIENT
Start: 2019-06-25

## 2019-07-12 DIAGNOSIS — H04.123 DRY EYES: ICD-10-CM

## 2019-07-14 RX ORDER — POLYVINYL ALCOHOL 14 MG/ML
1 SOLUTION/ DROPS OPHTHALMIC PRN
Qty: 15 ML | Refills: 3 | Status: SHIPPED | OUTPATIENT
Start: 2019-07-14

## 2019-07-17 ENCOUNTER — DOCUMENTATION ONLY (OUTPATIENT)
Dept: FAMILY MEDICINE | Facility: CLINIC | Age: 74
End: 2019-07-17

## 2019-07-17 NOTE — PROGRESS NOTES
Patient presents to clinic very upset that he has tried three times to get a colonoscopy completed. The first 2 times in March there was confusion about taking his Plavix. On July 10th he attempted colonoscopyagain but was unable to do it because he ate chicken and rice at 4pm. ERUM had that it was eaten  on 7/9 but patient tells me he ate the chicken on Monday at 4pm on 7/8. Patient explains that each time serveral people from [facility] have called him about the prep and it has been very confusing.   I called [facility] and Cuca will personally help him to reschedule and to help him successfully prep for the procedure.  Routed to Dr. Pastor.    Precious Sommers, RN BSN

## 2019-07-19 ENCOUNTER — OFFICE VISIT (OUTPATIENT)
Dept: FAMILY MEDICINE | Facility: CLINIC | Age: 74
End: 2019-07-19
Payer: MEDICARE

## 2019-07-19 VITALS
DIASTOLIC BLOOD PRESSURE: 70 MMHG | SYSTOLIC BLOOD PRESSURE: 119 MMHG | BODY MASS INDEX: 31.84 KG/M2 | WEIGHT: 184.2 LBS | HEART RATE: 50 BPM | RESPIRATION RATE: 16 BRPM | TEMPERATURE: 97.7 F | OXYGEN SATURATION: 97 %

## 2019-07-19 DIAGNOSIS — L21.9 SEBORRHEIC DERMATITIS OF SCALP: Primary | ICD-10-CM

## 2019-07-19 NOTE — PATIENT INSTRUCTIONS
Blood Pressure Study 8/2 @ 1:10pm with Dr. Cata Garcia (pharmacist).    Referred to dermatology. They will call with the appointment.    Good work sticking to the colonscopy!  Please let us know if we can help more.    DERMATOLOGY REFERRAL  [facility]  [address] [place] [id number]  Phone: [phone]      Appointment:   Wednesday July 31, 2019  Arrival Time:  1:10 pm  Provider:  Dr. Gaming

## 2019-07-19 NOTE — PROGRESS NOTES
"    There are no exam notes on file for this visit.  Chief Complaint   Patient presents with     Referral     Soap spots on head      Blood pressure 119/70, pulse 50, temperature 97.7  F (36.5  C), temperature source Oral, resp. rate 16, weight 83.6 kg (184 lb 3.2 oz), SpO2 97 %.                 HPI     Srikanth Graves is a 74 year old  male with a PMH significant for:     Patient Active Problem List   Diagnosis     Other constipation     Advance care planning     Bunion of great toe     Degeneration of cervical intervertebral disc     Chronic airway obstruction (H)     Chronic low back pain     Chronic pain disorder     Chronic obstructive pulmonary disease (H)     Coronary artery disease of native artery of native heart with stable angina pectoris (H)     Depression     DJD (degenerative joint disease), ankle and foot     Dyspnea on exertion     Edema     Esophageal reflux     Essential hypertension     Headache disorder     Peripheral vascular disease (H)     Primary open angle glaucoma of right eye, mild stage     Vitamin D deficiency     Cocaine abuse in remission (H)     Coccydynia     Clavus     Eye globe prosthesis     Hallux valgus, acquired     Neck pain     Routine adult health maintenance     Controlled substance agreement terminated     Testicular cyst     Intermittent chest pain     Mild episode of recurrent major depressive disorder (H)     Notalgia paresthetica     NS (nuclear sclerosis), right     Opioid type dependence (H)     Other syndromes affecting cervical region     Personal history of nicotine dependence     Sacroiliitis, not elsewhere classified (H)     Serum creatinine raised     Tobacco use disorder     Unspecified glaucoma(365.9)     He presents with sores on his head.  Has had shampoo and cream but not helping.  Spots are soft tender and scabbing \"balls\".  Keeps scalp moist iwht oils so dryness not an issues. Has been chronic for two years or more.  Also treated at [facility] [place] [facility] for the " same condition without relief.  Most recent medication on his list are Lidex and Nizoral shampoo.  He states that these both seem to make the condition worse with more flaking seen when using.  He is worried there is something serious going on due to the tenderness he feels.  He is also perplexed by the way of the spots move around on his head.  He has not been using any new soaps, shampoos, hair products.  No rashes on other parts of his body.  No fevers or chills.  No other concerns today.  He is wanting to see a specialist for the condition as is been so chronic and not going away at all from treatment and primary care.    Discussed frustration with getting his colonoscopy done.  He talked to a nurse earlier this week as noted in a telephone note.  Seems that this is taking care of now.  He should call us if he has any further questions about this.    PMH, Medications and Allergies were reviewed and updated as needed.             Physical Exam:     Vitals:    07/19/19 0911   BP: 119/70   Pulse: 50   Resp: 16   Temp: 97.7  F (36.5  C)   TempSrc: Oral   SpO2: 97%   Weight: 83.6 kg (184 lb 3.2 oz)     Body mass index is 31.84 kg/m .    Exam:  Constitutional: healthy, alert and no distress  Eyes: , conjunctiva are clear.  Skin: no rashes. Scalp has a few white-betty papules in left occiput area with some very mild irritation, pink in color around it, very subtle. Very minimal tenderness at this time. No scaling, flaking or rashes.  Psychiatric: mentation appears normal and affect normal/bright      Assessment and Plan     Srikanth was seen today for referral.    Diagnoses and all orders for this visit:    Seborrheic dermatitis of scalp: History and exam are most consistent with seborrheic dermatitis since he does have a little bit of flaking and papules.  Referral to dermatology placed due to the chronicity of this problem for him.  He agrees with this plan.  -     DERMATOLOGY REFERRAL; Future    Follow-up for blood  pressure study as noted in patient instructions and let us know if we can help with the colonoscopy prep.    Patient Instructions   Blood Pressure Study 8/2 @ 1:10pm with Dr. Cata Garcia (pharmacist).    Referred to dermatology. They will call with the appointment.    Good work sticking to the colonscopy!  Please let us know if we can help more.       Options for treatment and/or follow-up care were reviewed with the patient. Srikanth Graves was engaged and actively involved in the decision making process. He verbalized understanding of the options discussed and was satisfied with the final plan.    Alba Angelo MD

## 2019-08-02 ENCOUNTER — OFFICE VISIT (OUTPATIENT)
Dept: PHARMACY | Facility: PHYSICIAN GROUP | Age: 74
End: 2019-08-02
Payer: COMMERCIAL

## 2019-08-02 VITALS
BODY MASS INDEX: 31.62 KG/M2 | WEIGHT: 189.8 LBS | SYSTOLIC BLOOD PRESSURE: 146 MMHG | RESPIRATION RATE: 12 BRPM | HEART RATE: 52 BPM | HEIGHT: 65 IN | OXYGEN SATURATION: 96 % | DIASTOLIC BLOOD PRESSURE: 72 MMHG | TEMPERATURE: 98.5 F

## 2019-08-02 DIAGNOSIS — I10 ESSENTIAL HYPERTENSION: Primary | ICD-10-CM

## 2019-08-02 PROCEDURE — 99207 ZZC NO CHARGE LOS: CPT | Mod: GY | Performed by: PHARMACIST

## 2019-08-02 ASSESSMENT — MIFFLIN-ST. JEOR: SCORE: 1537.81

## 2019-08-02 NOTE — PROGRESS NOTES
OBP30 Study Note                                                       Srikanth is a 74 year old male here for the OBP-30 Study, IRB [id number], Dr. Cata Garcia PharmD, Principle Investigator.    Informed consent addressed with patient and patient signed: yes  Patient number: [id number]  Visit number: 1    SUBJECTIVE:  Current blood pressure medications, including dose, directions and last dose:    Amlodipine 10 mg daily, last dose: 8/2/19 morning     Furosemide 20 mg daily, last dose: 8/2/19 morning     Hydralazine 10 mg twice daily, last dose: 8/2/19 morning     Lisinopril 40 mg daily, last dose: 8/2/19 morning    Metoprolol 50 mg twice daily, last dose: 8/2/19 morning      Total number of blood pressure medications: 5  Race/ethnicity:    Language: English     Assessment of adherence  Over the past 7 days...    I took all doses of my blood pressure medication: always - 1    I missed or skipped at least one dose of my blood pressure medication: never - 1    I was not able to take all of my blood pressure medication: never - 1  Average Score: 1    The following were positive reasons identified for non-adherence:   I worried about taking them for the rest of my life - 4  I did not have any symptoms of high blood pressure - 4  I was afraid of becoming dependent on them - 4  I was afraid they may affect my sexual performance - 5  The time to take them was between my meals - 4  I was supposed to take them too many times a day - 4  I had other medications to take - 4  I felt well - 4  They make me need to urinate too often - 4    Patient uses a pill box: Never     Reason for Referral: N/A     OBJECTIVE:  Patient has the following medical conditions:  Atrial fibrillation or irregular heart rate: no  Obstructive sleep apnea:  no  Chronic kidney disease:  no, Stage: N/A    [place] Readings  Panel: Cardiovascular Disease  BP: 160/67     Last three clinic blood pressure readings  BP Readings from Last 3 Encounters:  "  08/02/19 (!) 146/72   07/19/19 119/70   05/29/19 131/57       Vitals today:   BP (!) 146/72 (BP Location: Left arm, Patient Position: Sitting, Cuff Size: Adult Regular)   Pulse 52   Temp 98.5  F (36.9  C) (Oral)   Resp 12   Ht 1.651 m (5' 5\")   Wt 86.1 kg (189 lb 12.8 oz)   SpO2 96%   BMI 31.58 kg/m      OBP-30 Readings  BP 1: 133/69 (not included in average) @ 1:50 PM  BP 2: 130/71 (not included in average)  BP 3: 130/74   BP 4: 127/74   BP 5: 134/72   BP 6: 137/80   BP 7: 128/74     OBP30 BP: 131.2/74.8     ASSESSMENT AND PLAN:  Srikanth has met the [place] [employer] blood pressure goal of <140/90 based on OBP30 reading.    Pharmacotherapy intervention if not met: N/A    This note has been routed to Rohit Pastor and Cata Garcia for their consideration.    Ivan Alonso, PharmD Student Researcher     I was present with the pharmacy student who participated in the service and in the documentation of this note. I have verified the history, personally performed the medical decision making, and have verified the content of the note, which accurately reflects my assessment of the patient and the plan of care.   Cata Garcia, PharmD       "

## 2019-09-27 ENCOUNTER — OFFICE VISIT (OUTPATIENT)
Dept: FAMILY MEDICINE | Facility: CLINIC | Age: 74
End: 2019-09-27
Payer: MEDICARE

## 2019-09-27 ENCOUNTER — TELEPHONE (OUTPATIENT)
Dept: FAMILY MEDICINE | Facility: CLINIC | Age: 74
End: 2019-09-27

## 2019-09-27 VITALS
OXYGEN SATURATION: 97 % | HEART RATE: 54 BPM | WEIGHT: 191 LBS | TEMPERATURE: 98.5 F | BODY MASS INDEX: 31.78 KG/M2 | SYSTOLIC BLOOD PRESSURE: 120 MMHG | DIASTOLIC BLOOD PRESSURE: 75 MMHG | RESPIRATION RATE: 20 BRPM

## 2019-09-27 DIAGNOSIS — M62.838 MUSCLE SPASM: Primary | ICD-10-CM

## 2019-09-27 DIAGNOSIS — I25.118 CORONARY ARTERY DISEASE OF NATIVE ARTERY OF NATIVE HEART WITH STABLE ANGINA PECTORIS (H): ICD-10-CM

## 2019-09-27 DIAGNOSIS — R06.09 DYSPNEA ON EXERTION: ICD-10-CM

## 2019-09-27 RX ORDER — CYCLOBENZAPRINE HCL 5 MG
5 TABLET ORAL
Qty: 15 TABLET | Refills: 0 | Status: SHIPPED | OUTPATIENT
Start: 2019-09-27 | End: 2020-03-05

## 2019-09-27 NOTE — Clinical Note
Kellie,Could you see if referrals needs a new order for his spinal MRI?  If so, I can put one in, but he needs the MRI done when it works for him.  Thanks!Nile
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
## 2019-09-27 NOTE — TELEPHONE ENCOUNTER
"Left message for Anni, [facility] to discuss chair lift rx. Pt states it was faxed over \"days\" ago. ./LR  "

## 2019-09-27 NOTE — PROGRESS NOTES
Preceptor Attestation:   Patient seen, evaluated and discussed with the resident. I have verified the content of the note, which accurately reflects my assessment of the patient and the plan of care.   Supervising Physician:  William Lee MD.
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
## 2019-09-27 NOTE — PATIENT INSTRUCTIONS
Mr. Graves,    Thank you very much for coming in to clinic today!    1)  We'll inquire about getting the MRI of your spine.  2)  Call and get back in to Cardiology because of the shortness of breath.  3)  Follow-up with your primary physician, Dr. Pastor, in 2 weeks, or sooner if needed.  4)  Flexeril 5 mg once nightly at bedtime for muscle spasm.    Thank you again!    Sincerely,    Dr. Vazquez    [facility] Radiology  [phone]  Fax Orders to [phone]    [address] [address]    Appointment: 2019  Arrival Time:  5:30 pm     Please bring a copy of your insurance card and photo ID    If you cannot make this appointment please call [phone] to reschedule    October 3, 2019 Order faxed to [facility] Scheduling at [phone]. Keshia Bloom

## 2019-10-03 ENCOUNTER — RECORDS - HEALTHEAST (OUTPATIENT)
Dept: ADMINISTRATIVE | Facility: OTHER | Age: 74
End: 2019-10-03

## 2019-10-09 ENCOUNTER — HOSPITAL ENCOUNTER (OUTPATIENT)
Dept: MRI IMAGING | Facility: CLINIC | Age: 74
Discharge: HOME | End: 2019-10-09
Attending: FAMILY MEDICINE

## 2019-10-09 DIAGNOSIS — M62.838 MUSCLE SPASM: ICD-10-CM

## 2019-10-11 ENCOUNTER — OFFICE VISIT (OUTPATIENT)
Dept: FAMILY MEDICINE | Facility: CLINIC | Age: 74
End: 2019-10-11
Payer: MEDICARE

## 2019-10-11 VITALS
BODY MASS INDEX: 30.39 KG/M2 | RESPIRATION RATE: 24 BRPM | HEART RATE: 69 BPM | TEMPERATURE: 97.8 F | DIASTOLIC BLOOD PRESSURE: 73 MMHG | OXYGEN SATURATION: 95 % | SYSTOLIC BLOOD PRESSURE: 136 MMHG | WEIGHT: 182.4 LBS | HEIGHT: 65 IN

## 2019-10-11 DIAGNOSIS — N18.30 CKD (CHRONIC KIDNEY DISEASE) STAGE 3, GFR 30-59 ML/MIN (H): ICD-10-CM

## 2019-10-11 DIAGNOSIS — Z23 NEED FOR PROPHYLACTIC VACCINATION AND INOCULATION AGAINST INFLUENZA: Primary | ICD-10-CM

## 2019-10-11 DIAGNOSIS — M10.072 ACUTE IDIOPATHIC GOUT OF LEFT FOOT: ICD-10-CM

## 2019-10-11 LAB
BUN SERPL-MCNC: 19.1 MG/DL (ref 7–21)
CALCIUM SERPL-MCNC: 9.1 MG/DL (ref 8.5–10.1)
CHLORIDE SERPLBLD-SCNC: 110.6 MMOL/L (ref 98–110)
CO2 SERPL-SCNC: 23.9 MMOL/L (ref 20–32)
CREAT SERPL-MCNC: 1.3 MG/DL (ref 0.7–1.3)
CREAT UR-MCNC: 371.6 MG/DL
GFR SERPL CREATININE-BSD FRML MDRD: 56.7 ML/MIN/1.7 M2
GLUCOSE SERPL-MCNC: 111.7 MG'DL (ref 70–99)
MICROALBUMIN UR-MCNC: 2.59 MG/DL (ref 0–1.99)
MICROALBUMIN/CREAT UR: 7 MG/G
POTASSIUM SERPL-SCNC: 3.5 MMOL/DL (ref 3.2–4.6)
SODIUM SERPL-SCNC: 141.1 MMOL/L (ref 132–142)
URATE SERPL-MCNC: 7.9 MG/DL (ref 3–8)

## 2019-10-11 RX ORDER — ALLOPURINOL 300 MG/1
300 TABLET ORAL DAILY
Qty: 30 TABLET | Refills: 5 | Status: SHIPPED | OUTPATIENT
Start: 2019-10-16 | End: 2019-10-21

## 2019-10-11 RX ORDER — HYDROCODONE BITARTRATE AND ACETAMINOPHEN 5; 325 MG/1; MG/1
1 TABLET ORAL EVERY 6 HOURS PRN
Qty: 15 TABLET | Refills: 0 | Status: SHIPPED | OUTPATIENT
Start: 2019-10-11 | End: 2019-10-21

## 2019-10-11 RX ORDER — COLCHICINE 0.6 MG/1
TABLET ORAL
Qty: 30 TABLET | Refills: 3 | Status: SHIPPED | OUTPATIENT
Start: 2019-10-11 | End: 2019-12-12

## 2019-10-11 ASSESSMENT — ANXIETY QUESTIONNAIRES
2. NOT BEING ABLE TO STOP OR CONTROL WORRYING: NOT AT ALL
7. FEELING AFRAID AS IF SOMETHING AWFUL MIGHT HAPPEN: NOT AT ALL
GAD7 TOTAL SCORE: 2
1. FEELING NERVOUS, ANXIOUS, OR ON EDGE: NOT AT ALL
5. BEING SO RESTLESS THAT IT IS HARD TO SIT STILL: NOT AT ALL
3. WORRYING TOO MUCH ABOUT DIFFERENT THINGS: NOT AT ALL
6. BECOMING EASILY ANNOYED OR IRRITABLE: SEVERAL DAYS
IF YOU CHECKED OFF ANY PROBLEMS ON THIS QUESTIONNAIRE, HOW DIFFICULT HAVE THESE PROBLEMS MADE IT FOR YOU TO DO YOUR WORK, TAKE CARE OF THINGS AT HOME, OR GET ALONG WITH OTHER PEOPLE: SOMEWHAT DIFFICULT

## 2019-10-11 ASSESSMENT — PATIENT HEALTH QUESTIONNAIRE - PHQ9
SUM OF ALL RESPONSES TO PHQ QUESTIONS 1-9: 0
5. POOR APPETITE OR OVEREATING: SEVERAL DAYS

## 2019-10-11 ASSESSMENT — MIFFLIN-ST. JEOR: SCORE: 1504.24

## 2019-10-11 ASSESSMENT — PAIN SCALES - GENERAL: PAINLEVEL: WORST PAIN (10)

## 2019-10-11 NOTE — PATIENT INSTRUCTIONS
We are treating you for acute gout - I will let you know the results of your blood tests and Xray.  Go to your pharmacy today.  OK to continue taking your usual medications.      Look over this information to see if you can reduce risk of gout attacks:  Patient Education     Treating Gout Attacks     Raising the joint above the level of your heart can help reduce gout symptoms.     Gout is a disease that affects the joints. It is caused by excess uric acid in your blood that may lead to crystals forming in your joints. Left untreated, it can lead to painful foot and joint deformities and even kidney problems. But, by treating gout early, you can relieve pain and help prevent future problems. Gout can usually be treated with medicine and proper diet. In severe cases, surgery may be needed.  Gout attacks are painful and often happen more than once. Taking medicines may reduce pain and prevent attacks in the future. There are also some things you can do at home to relieve symptoms.  Medicines for gout  Your healthcare provider may prescribe a daily medicine to reduce levels of uric acid. Reducing your uric acid levels may help prevent gout attacks. Allopurinol is one commonly used medicine taken daily to reduce uric acid levels. Other daily medicines used to reduce uric acid levels include febuxostat, lesinurad, and probencid. Other medicines can help relieve pain and swelling during an acute attack. Medicines such as NSAIDs (nonsteroidal anti-inflammatory medicines), steroids, and colchicine may be prescribed for intermittent use to relieve an acute gout attack. Be sure to take your medicine as directed.  What you can do  Below are some things you can do at home to relieve gout symptoms. Your healthcare provider may have other tips.    Rest the painful joint as much as you can.    Raise the painful joint so it is at a level higher than your heart.    Use ice for 10 minutes every 1 to 2 hours as possible.  How can I  prevent gout?  With a little effort, you may be able to prevent gout attacks in the future. Here are some things you can do:    Don't eat foods high in purines  ? Certain meats (red meat, processed meat, turkey)  ? Organ meats (kidney, liver, sweetbread)  ? Shellfish (lobster, crab, shrimp, scallop, mussel)  ? Certain fish (anchovy, sardine, herring, mackerel)    Take any medicines prescribed by your healthcare provider.    Lose weight if you need to.    Reduce high fructose corn syrup in meals and drinks.    Reduce or cut out alcohol, particularly beer, but also red wine and spirits.    Control blood pressure, diabetes, and cholesterol.    Drink plenty of water to help flush uric acid from your body.  Date Last Reviewed: 4/1/2018 2000-2018 [employer]. [address] [place] [address]. All rights reserved. This information is not intended as a substitute for professional medical care. Always follow your healthcare professional's instructions.

## 2019-10-11 NOTE — PROGRESS NOTES
"This is a 74-year-old male not previously known to me.  He attends with a 4-day history of severe first metatarsal joint pain on the left foot.  He denies any history of trauma and reports that it gradually came on over the course of a day but has been very severe to the point that he is unable to sleep and unable to weight-bear on that foot.  He does report that he has had episodes like this in the past occurring about every 4 or 5 months in the same joint over about the past 3 years.  He is not talked with the doctor about it.  He knows he is a bunion in this joint but also has one in the other foot which never bothers him.  He acknowledges that he eats quite a bit of chicken but cannot say that he was eating more than usual leading up to this.  He does not drink alcohol at all he does have some abnormal kidney function and is agreeable to rechecking this.  He reports that he is adherent with his medications.    He has no other concerns today.    Objective:  /73   Pulse 69   Temp 97.8  F (36.6  C) (Oral)   Resp 24   Ht 1.651 m (5' 5\")   Wt 82.7 kg (182 lb 6.4 oz)   SpO2 95%   BMI 30.35 kg/m    His blood pressure is good, he is borderline obese  He has a significant hallux valgus deformity of both first metatarsal phalangeal joints but worse on the left side.  He is tender on palpation of the left joint but not the right.  It is not particularly warm and I cannot visualize erythema.  He does have palpable pedal pulses bilaterally.    I did obtain an x-ray of the joint and this demonstrates severe hallux valgus deformity with subluxation at that joint but no other bony changes of concern.  Results for orders placed or performed in visit on 10/11/19   Uric Acid ([facility])   Result Value Ref Range    Uric Acid 7.9 3.0 - 8.0 mg/dL    Narrative    Test performed by:  [facility]  [address] [place] [address]   Basic Metabolic Panel ([place])   Result Value Ref Range    Urea Nitrogen 19.1 " 7.0 - 21.0 mg/dL    Calcium 9.1 8.5 - 10.1 mg/dL    Chloride 110.6 (H) 98.0 - 110.0 mmol/L    Carbon Dioxide 23.9 20.0 - 32.0 mmol/L    Creatinine 1.3 (H) 0.7 - 1.3 mg/dL    Glucose 111.7 (H) 70.0 - 99.0 mg'dL    Potassium 3.5 3.2 - 4.6 mmol/dL    Sodium 141.1 132.0 - 142.0 mmol/L    GFR Estimate 56.7 (L) >60.0 mL/min/1.7 m2    GFR Estimate If Black 68.6 >60.0 mL/min/1.7 m2   Microalbumin Creatinine Ratio Random Ur ([place])   Result Value Ref Range    Microalbumin, Urine 2.59 (H) 0.00 - 1.99 mg/dL    Creatinine, Urine 371.6 mg/dL    Albumin Urine mg/g Cr 7.0 <=19.9 mg/g    Narrative    Test performed by:  [facility]  [address] [place] [address]  Microalbumin, Random Urine  <2.0 mg/dL . . . . . . . . Normal  3.0-30.0 mg/dL . . . . . . Microalbuminuria  >30.0 mg/dL . . . . . .  . Clinical Proteinuria  Microalbumin/Creatinine Ratio, Random Urine  <20 mg/g . . . . .. . . . Normal   mg/g . . . . . . . Microalbuminuria  >300 mg/g . . . . . . . . Clinical Proteinuria     Srikanth was seen today for recheck and imm/inj.    Diagnoses and all orders for this visit:    Need for prophylactic vaccination and inoculation against influenza  -     Fluzone high dose, 65+ years [78102]    Acute idiopathic gout of left foot  -     XR TOE LT G/E 2 VW  -     Uric Acid ([facility])  -     colchicine (COLCYRS) 0.6 MG tablet; Take 1 tab up to twice daily as needed for acute gout pain  -     HYDROcodone-acetaminophen (NORCO) 5-325 MG tablet; Take 1 tablet by mouth every 6 hours as needed for severe pain  -     allopurinol (ZYLOPRIM) 300 MG tablet; Take 1 tablet (300 mg) by mouth daily    CKD (chronic kidney disease) stage 3, GFR 30-59 ml/min (H)  -     Basic Metabolic Panel ([place])  -     Microalbumin Creatinine Ratio Random Ur ([facility])  -     allopurinol (ZYLOPRIM) 300 MG tablet; Take 1 tablet (300 mg) by mouth daily      I have indicated to this gentleman that clinically it is likely he is experiencing  recurrent acute gout in this joint.  At the time of dictation his labs have returned which do not confirm gout.  Consideration could be given to aspirate fluid from the joint trying to to demonstrate crystals however I personally have had poor success with this procedure in the past and I am not inclined to pursue this.  I have indicated to him that I think the clinical likelihood of gout is high enough, particular given his mild renal insufficiency, to treat for gout.  I therefore given him colchicine for acute symptoms.  We discussed uricosuric agents to prevent gout episodes and he is interested in starting on allopurinol since he has had such debilitating symptoms this week.  I sent a prescription for an appropriately renal dosed allopurinol.  I have encouraged him to follow-up if his symptoms are not improving or worsening.    Total visit time was 25 mins, all of which was face to face MD time, and over 50% of this time was spent in counseling and coordination of care.

## 2019-10-11 NOTE — NURSING NOTE
Chief Complaint   Patient presents with     RECHECK     Swollen and pain on left Foot/ Numbness and Tingling too.     Tommie Catalan, CMA

## 2019-10-11 NOTE — LETTER
October 14, 2019      Srikanth Dickeyton  [address] [place] [address]        Dear Ayesha [facility], your gout test - uric acid - was normal.  Also the X-ray shows that you have a bunion as you know but no other major problems.  So, I think it is very likely that you have gout but I cannot confirm that it is gout.  It will help us to know if you got better quickly with the treatment.  If you did - and since it only treats gout - this would be further confirmation of gout.  If you continue to have pain in that joint, then you might also benefit from getting that bunion treated.  I think it is still a reasonable option to start taking the preventive gout medication, allopurinol, daily - but it is also OK if you choose not to - and instead wait to see if this happens again.  OK?  Happy to talk further with you if you wish - you can call the clinic or schedule an appointment.     Otherwise, your kidney function did improve since it was last checked, good - and you lose just a tiny amount of protein in your urine.  We should keep tracking this about every 6-12 months - regards, Dr Yassine Gonzalez     Please see below for your test results.    Resulted Orders   Uric Acid ([facility])   Result Value Ref Range    Uric Acid 7.9 3.0 - 8.0 mg/dL    Narrative    Test performed by:  [facility]  [address] [place] [address]   Basic Metabolic Panel ([place])   Result Value Ref Range    Urea Nitrogen 19.1 7.0 - 21.0 mg/dL    Calcium 9.1 8.5 - 10.1 mg/dL    Chloride 110.6 (H) 98.0 - 110.0 mmol/L    Carbon Dioxide 23.9 20.0 - 32.0 mmol/L    Creatinine 1.3 (H) 0.7 - 1.3 mg/dL    Glucose 111.7 (H) 70.0 - 99.0 mg'dL    Potassium 3.5 3.2 - 4.6 mmol/dL    Sodium 141.1 132.0 - 142.0 mmol/L    GFR Estimate 56.7 (L) >60.0 mL/min/1.7 m2    GFR Estimate If Black 68.6 >60.0 mL/min/1.7 m2   Microalbumin Creatinine Ratio Random Ur ([facility])   Result Value Ref Range    Microalbumin, Urine 2.59 (H) 0.00 - 1.99 mg/dL     Creatinine, Urine 371.6 mg/dL    Albumin Urine mg/g Cr 7.0 <=19.9 mg/g    Narrative    Test performed by:  [facility] [address] [place] [address]  Microalbumin, Random Urine  <2.0 mg/dL . . . . . . . . Normal  3.0-30.0 mg/dL . . . . . . Microalbuminuria  >30.0 mg/dL . . . . . .  . Clinical Proteinuria  Microalbumin/Creatinine Ratio, Random Urine  <20 mg/g . . . . .. . . . Normal   mg/g . . . . . . . Microalbuminuria  >300 mg/g . . . . . . . . Clinical Proteinuria       If you have any questions, please call the clinic to make an appointment.    Sincerely,    Yassine Gonzalez MD

## 2019-10-12 ASSESSMENT — ANXIETY QUESTIONNAIRES: GAD7 TOTAL SCORE: 2

## 2019-10-12 NOTE — RESULT ENCOUNTER NOTE
Hello sir, your gout test - uric acid - was normal.  Also the X-ray shows that you have a bunion as you know but no other major problems.  So, I think it is very likely that you have gout but I cannot confirm that it is gout.  It will help us to know if you got better quickly with the treatment.  If you did - and since it only treats gout - this would be further confirmation of gout.  If you continue to have pain in that joint, then you might also benefit from getting that bunion treated.  I think it is still a reasonable option to start taking the preventive gout medication, allopurinol, daily - but it is also OK if you choose not to - and instead wait to see if this happens again.  OK?  Happy to talk further with you if you wish - you can call the clinic or schedule an appointment.    Otherwise, your kidney function did improve since it was last checked, good - and you lose just a tiny amount of protein in your urine.  We should keep tracking this about every 6-12 months - regards, Dr Yassine Gonzalez

## 2019-10-21 ENCOUNTER — OFFICE VISIT (OUTPATIENT)
Dept: FAMILY MEDICINE | Facility: CLINIC | Age: 74
End: 2019-10-21
Payer: MEDICARE

## 2019-10-21 VITALS
BODY MASS INDEX: 30.99 KG/M2 | TEMPERATURE: 98.3 F | HEART RATE: 69 BPM | HEIGHT: 65 IN | RESPIRATION RATE: 16 BRPM | DIASTOLIC BLOOD PRESSURE: 63 MMHG | WEIGHT: 186 LBS | SYSTOLIC BLOOD PRESSURE: 110 MMHG | OXYGEN SATURATION: 98 %

## 2019-10-21 DIAGNOSIS — M10.072 ACUTE IDIOPATHIC GOUT OF LEFT FOOT: ICD-10-CM

## 2019-10-21 DIAGNOSIS — N18.30 CKD (CHRONIC KIDNEY DISEASE) STAGE 3, GFR 30-59 ML/MIN (H): ICD-10-CM

## 2019-10-21 RX ORDER — HYDROCODONE BITARTRATE AND ACETAMINOPHEN 5; 325 MG/1; MG/1
1 TABLET ORAL EVERY 6 HOURS PRN
Qty: 20 TABLET | Refills: 0 | Status: SHIPPED | OUTPATIENT
Start: 2019-10-21 | End: 2019-12-17

## 2019-10-21 RX ORDER — ALLOPURINOL 300 MG/1
300 TABLET ORAL DAILY
Qty: 30 TABLET | Refills: 5 | Status: SHIPPED | OUTPATIENT
Start: 2019-10-21 | End: 2020-02-27

## 2019-10-21 ASSESSMENT — MIFFLIN-ST. JEOR: SCORE: 1520.57

## 2019-10-21 NOTE — PROGRESS NOTES
"This is a 74-year-old who returns complaining of improved but continued left foot pain.  He reports that the gout treatment with the narcotic prescription helped improve his pain but it has not fully gone away.  He identifies one spot proximal to his bunion which is mildly tender.  He also has a tender medial aspect of the great toenail.  The toenail itself is deformed at this location and pressure on the skin proximal to the deformed toenail is tender.  He also has some pain over the mid dorsal forefoot as well as some pain when he walks overlying the mid plantar aspect of the left foot.    He reiterates that he has had pain intermittently in the left first MTP joint every 4 to 5 months alternating with pain in the same joint on the right foot.  He tells me that he has seen a foot specialist-although this may be an orthotic supplier and not actually a podiatrist-who has encouraged him not to pursue surgical correction of his bunions.  That person has told him it will take up to 2 years to recover after surgery.  I disputed that report saying that I believe the recovery is much shorter than that.    He tells me that as a child he shattered his left femur such that his left leg is about 1.5 inches shorter than the right leg.  He puts a leg raise into his shoe on the left side.  I offered to perform a partial IGT and    Otherwise he is doing well and does not raise any other concerns for today's visit.    Objective:  /63 (BP Location: Left arm, Patient Position: Sitting, Cuff Size: Adult Regular)   Pulse 69   Temp 98.3  F (36.8  C) (Oral)   Resp 16   Ht 1.651 m (5' 5\")   Wt 84.4 kg (186 lb)   SpO2 98%   BMI 30.95 kg/m    His blood pressure is excellent  Exam of his left foot confirms a significant hallux valgus deformity.  He is tender on palpating the first MT bone just proximal to the first MTP joint.  In addition his great toenail appears onychomycotic on the medial aspect and just adjacent to this the " skin is tender consistent with symptoms of ingrown toenail.  He also describes some tenderness on palpating at approximately the third M TP joint on the dorsum as well as tenderness in approximately the same position on the plantar surface.  He explains that the latter symptom is worse when he walks.    We reviewed the x-ray report which is largely unremarkable apart from the hallux valgus deformity.  In addition we reviewed the lab results which he apparently did not receive a letter from me about.  I explained that his uric acid was normal which can occur in the presence of acute gout.    Srikanth was seen today for follow up.    Diagnoses and all orders for this visit:    Acute idiopathic gout of left foot  -     HYDROcodone-acetaminophen (NORCO) 5-325 MG tablet; Take 1 tablet by mouth every 6 hours as needed for severe pain  -     allopurinol (ZYLOPRIM) 300 MG tablet; Take 1 tablet (300 mg) by mouth daily    CKD (chronic kidney disease) stage 3, GFR 30-59 ml/min (H)  -     allopurinol (ZYLOPRIM) 300 MG tablet; Take 1 tablet (300 mg) by mouth daily      He believes that he has started taking allopurinol.  At last visit we had agreed that he would wait until his acute symptoms had subsided before starting this.  Today I sent a refill to his pharmacy to confirm that he would be on it if he has not already started it.    He did ask me for some pain meds.  He tells me that he used cocaine about 20 years ago but swears that he is been free of illegal drugs since that time.  He apparently went through a period when he receives methadone along with Tylenol with codeine in the [place] system up until about 5 years ago and assures me that he has not received any regular narcotics since that time.    I elected to give him a very limited supply of Vicodin without plan to refill this.  We discussed other options including a referral to podiatry/foot orthopedics to discuss surgical treatment of his hallux valgus.  I dispute  the recommendations of the person whom he seen before and advised him that I think his recovery would be much shorter and that he would be much more likely to have a good response to treatment.     I offered to perform a partial IGTN removal on the left great toe which would afford him some symptom relief in that location.  He had several questions about this which I attempted to answer taking approximately 5 minutes.  He will follow-up with me if he would like to pursue this.    I indicated that clinically there is a moderate probability that he has gout despite the absence of confirming x-ray nor lab results.  Overall continuing with allopurinol is a reasonable option and if he notices that he does not get recurrence of first MTP joint pain in either foot over the coming months this would confirm that he most likely has had gout.  He expresses understanding and agreement with this general approach.    Total visit time was 25 mins, all of which was face to face MD time, and over 50% of this time was spent in counseling and coordination of care.

## 2019-10-21 NOTE — PATIENT INSTRUCTIONS
We can do a PARTIAL INGROWN TOE NAIL REMOVAL on your left great toe if it continues to bother you.  For the other pains in that foot, I think you would need to see a foot doctor.

## 2019-10-24 ENCOUNTER — OFFICE VISIT (OUTPATIENT)
Dept: FAMILY MEDICINE | Facility: CLINIC | Age: 74
End: 2019-10-24
Payer: MEDICARE

## 2019-10-24 VITALS
RESPIRATION RATE: 17 BRPM | WEIGHT: 184.6 LBS | BODY MASS INDEX: 30.72 KG/M2 | DIASTOLIC BLOOD PRESSURE: 74 MMHG | SYSTOLIC BLOOD PRESSURE: 126 MMHG | OXYGEN SATURATION: 96 % | TEMPERATURE: 97.5 F | HEART RATE: 65 BPM

## 2019-10-24 DIAGNOSIS — J30.2 SEASONAL ALLERGIC RHINITIS, UNSPECIFIED TRIGGER: ICD-10-CM

## 2019-10-24 DIAGNOSIS — R19.5 POSITIVE FIT (FECAL IMMUNOCHEMICAL TEST): Primary | ICD-10-CM

## 2019-10-24 DIAGNOSIS — L91.8 SKIN TAG: ICD-10-CM

## 2019-10-24 RX ORDER — DIPHENHYDRAMINE HCL 25 MG
25-50 CAPSULE ORAL EVERY 6 HOURS PRN
Qty: 60 CAPSULE | Refills: 1 | Status: SHIPPED | OUTPATIENT
Start: 2019-10-24 | End: 2020-03-09

## 2019-10-24 ASSESSMENT — PATIENT HEALTH QUESTIONNAIRE - PHQ9: SUM OF ALL RESPONSES TO PHQ QUESTIONS 1-9: 1

## 2019-10-24 NOTE — PROGRESS NOTES
"[facility] Visit    Subjective:  Srikanth Graves is a 74 year old male with a PMHx significant for   Patient Active Problem List   Diagnosis     Other constipation     Advance care planning     Bunion of great toe     Degeneration of cervical intervertebral disc     Chronic airway obstruction (H)     Chronic low back pain     Chronic pain disorder     Chronic obstructive pulmonary disease (H)     Coronary artery disease of native artery of native heart with stable angina pectoris (H)     Depression     DJD (degenerative joint disease), ankle and foot     Dyspnea on exertion     Edema     Esophageal reflux     Essential hypertension     Headache disorder     Peripheral vascular disease (H)     Primary open angle glaucoma of right eye, mild stage     Vitamin D deficiency     Cocaine abuse in remission (H)     Coccydynia     Clavus     Eye globe prosthesis     Hallux valgus, acquired     Neck pain     Routine adult health maintenance     Controlled substance agreement terminated     Testicular cyst     Intermittent chest pain     Mild episode of recurrent major depressive disorder (H)     Notalgia paresthetica     NS (nuclear sclerosis), right     Opioid type dependence (H)     Other syndromes affecting cervical region     Personal history of nicotine dependence     Sacroiliitis, not elsewhere classified (H)     Serum creatinine raised     Tobacco use disorder     Unspecified glaucoma(365.9)    who presents with a \"anal bump\" and questions about how to get colonoscopy done.     He has noticed a \"bump\" on his anus, which is been present for roughly the past year.  It is not painful or pruritic.  He can feel the \"bump\" when he wipes.  He will occasionally have a speck of blood on toilet paper after wiping, this only occurs if he wipes vigorously.  Specks of blood on the toilet paper occurs infrequently.  He never has more than a speck of blood on toilet paper.  He does not have blood in his stool.  He " has not noticed any bright red blood in the toilet.  He is never had issues with this before.  He has not tried any topical treatments for this.    He has had trouble getting a colonoscopy done since his positive FIT test in February of this year.  He is to have his colonoscopy done at [facility].  He has been rescheduled at least 3 times due to confusion about dosing of Plavix or confusion regarding prep/eating prior to the procedure.  This is very frustrating for him.  He has not had change in stool caliber, no blood in the stool, no unexpected weight loss, no fevers.  He would still like to have the colonoscopy done at [facility].      I reviewed the cardiology notes from each [facility].  He had stenting done in February 2018.  At that time he was put on dual antiplatelet therapy with ASA 81 and Plavix.  Per most recent cardiology note, he was to complete a total of 1.5 years of dual antiplatelet therapy then discontinue Plavix and be on monotherapy with aspirin thereafter.    ROS:   A complete 12-point ROS was obtained and was negative except as stated above in HPI.    Objective:  Vitals:    10/24/19 0920 10/24/19 0923   BP: (!) 143/81 126/74   BP Location: Left arm Left arm   Patient Position: Sitting Sitting   Cuff Size: Adult Regular Adult Regular   Pulse: 65 65   Resp: 17    Temp: 97.5  F (36.4  C)    TempSrc: Oral    SpO2: 96%    Weight: 83.7 kg (184 lb 9.6 oz)      Body mass index is 30.72 kg/m .    GEN: NAD, healthy, alert  EYES: Left prosthetic eye  : Uncircumcised penis  ANUS: 2 skin colored, fleshy growths at roughly 1:00 and 6:00.  There are not fungating or verifications appearing.  They are not tender to palpation.  No surrounding erythema, no open sores or fissures.  Deferred digital rectal exam.  SKIN: Bilaterally dry skin of buttocks, no erythema, no lesions, no sores  PSYCH: mentation appears normal, affect normal/bright    No results found for this or any previous visit (from the past 24  hour(s)).    Assessment/Plan:  Srikanth was seen today for other, other and medication reconciliation.    Diagnoses and all orders for this visit:    Positive FIT (fecal immunochemical test)  Patient has had difficulty getting his colonoscopy done since having a positive fit test in February of this year  Due to confusion on Plavix mostly.  I did review cardiology notes that he is well past his 1.5 years of therapy of dual antiplatelet medications.  His 1.5 years of therapy ended on 8/8/2019.  Therefore we will discontinue Plavix today and he will stay on ASA 81 mg daily, will need to hold this for 3 days prior to colonoscopy but this will be addressed by GI  Again.  We will have patient see a referral coordinator to help him reschedule colonoscopy.  He is very motivated to get this done and I encouraged him in this vain.  -Discontinue Plavix  -Continue ASA 81 mg daily, hold for 3 days prior to colonoscopy    Skin tag of anus  1 year of anal lesion, infrequent tiny amount of blood on toilet paper only with vigorous wiping.  Exam consistent with 2 anal skin tags.  Low suspicion for condyloma as they are fleshy, soft and very non-verifications appearing.  Discussed options with patient including excision, but this will be done by colorectal surgery.  They do not bother him enough to have anything done.  He says that he just wanted to know what they were. He is getting a colonoscopy so they will get a second look.    Seasonal allergies, unknown trigger  Patient requesting refill of Benadryl for seasonal allergies.  Offered second-generation nondrowsy antihistamine, patient said that usually needs to take it at night so he is okay with staying with Benadryl.    Options for treatment and follow-up care were reviewed with the patient who was engaged and actively involved in the decision making process, verbalized understanding of the options discussed, and satisfied with the final plan.    Patient was staffed with  supervising physician, Dr. Pastor.     Giovanni Galvez MD PGY2  [facility]

## 2019-10-24 NOTE — PATIENT INSTRUCTIONS
Stop taking Plavix; your course of this medication is done!    We will help you get our colonoscopy scheduled before you go.       GASTROENTEROLOGY ADULT REF PROCEDURE ONLY    [facility]  Phone [phone]    [address] [address]    Appointment: Wednesday December 11th  Arrival time: 12:45pm    Our RN Kendra will call you the day before to go over your prep instructions.

## 2019-10-24 NOTE — LETTER
October 24, 2019      Srikanth Graves  [address] [place] [address]        Dear [facility]  Phone [phone]    [address] [address]    Appointment: Wednesday December 11th  Arrival time: 12:45pm    Our RN Kendra will call you the day before to go over your prep instructions.     Sincerely,    Meghan Rosario

## 2019-10-28 NOTE — PROGRESS NOTES
Preceptor Attestation:   Patient seen, evaluated and discussed with the resident. I have verified the content of the note, which accurately reflects my assessment of the patient and the plan of care.   Supervising Physician:  Rohit Pastor MD.

## 2019-11-06 ENCOUNTER — TELEPHONE (OUTPATIENT)
Dept: FAMILY MEDICINE | Facility: CLINIC | Age: 74
End: 2019-11-06

## 2019-11-06 DIAGNOSIS — I10 ESSENTIAL HYPERTENSION: ICD-10-CM

## 2019-11-06 NOTE — TELEPHONE ENCOUNTER
consulted with SP on forms he recieved from patient's , Anni, for a lift chair. SP verified that a face to face is required and per Medicare rules it has to be within 45 days. SW offered to reach out to patient to have him schedule a face to face with . He has openings as early as Tuesday, November 12th.     SW called patient. He did not answer. SP left a detailed message explaining that he should call back to schedule the appointment with .     SP reached out to Anni, . Left a VM indicating a face to face is required and requested a call back to discuss this further.     JUAN Valenzuela

## 2019-11-07 ENCOUNTER — DOCUMENTATION ONLY (OUTPATIENT)
Dept: FAMILY MEDICINE | Facility: CLINIC | Age: 74
End: 2019-11-07

## 2019-11-07 RX ORDER — AMLODIPINE BESYLATE 10 MG/1
10 TABLET ORAL DAILY
Qty: 90 TABLET | Refills: 3 | Status: SHIPPED | OUTPATIENT
Start: 2019-11-07 | End: 2020-06-05

## 2019-11-07 NOTE — TELEPHONE ENCOUNTER
Carlos called this SP back. He scheduled for Tuesday, November 12th at 9:40am with  for the required face to face visit.  will complete forms for lift chair at that time.     SP called Anni and left her a message with the above information as she is facilitating ordering the chair.     JUAN Valenzuela

## 2019-11-07 NOTE — PROGRESS NOTES
To be completed in Nursing note:    Please reference list for forms that require a visit for completion.  Please remind patients that providers are given 3-5 business days to complete and return forms.      Form type:  Certificate of Medical Necessity [id number] --- Seat Lift Mechanisms    Date form received: 11/7/19    Date form completed by Physician: Dr. Pastor will be in clinic on 11/12/2019    How was form returned to patient (mailed, faxed, or at  for patient to ):  Need to fax back to Miguelina Montes @ [phone] when form complete.    Date form mailed/faxed/left at  for patient and sent to HIM for scanning:      Once form is left for patient, faxed, or mailed PCS will then close the documentation only encounter.

## 2019-11-12 ENCOUNTER — MEDICAL CORRESPONDENCE (OUTPATIENT)
Dept: HEALTH INFORMATION MANAGEMENT | Facility: CLINIC | Age: 74
End: 2019-11-12

## 2019-11-12 ENCOUNTER — OFFICE VISIT (OUTPATIENT)
Dept: FAMILY MEDICINE | Facility: CLINIC | Age: 74
End: 2019-11-12
Payer: MEDICARE

## 2019-11-12 VITALS
DIASTOLIC BLOOD PRESSURE: 64 MMHG | HEART RATE: 52 BPM | TEMPERATURE: 97 F | HEIGHT: 65 IN | OXYGEN SATURATION: 97 % | WEIGHT: 184.4 LBS | BODY MASS INDEX: 30.72 KG/M2 | SYSTOLIC BLOOD PRESSURE: 114 MMHG | RESPIRATION RATE: 16 BRPM

## 2019-11-12 DIAGNOSIS — M17.0 OSTEOARTHRITIS OF BOTH KNEES, UNSPECIFIED OSTEOARTHRITIS TYPE: ICD-10-CM

## 2019-11-12 DIAGNOSIS — M54.40 CHRONIC BILATERAL LOW BACK PAIN WITH SCIATICA, SCIATICA LATERALITY UNSPECIFIED: Primary | ICD-10-CM

## 2019-11-12 DIAGNOSIS — M25.551 HIP PAIN, RIGHT: ICD-10-CM

## 2019-11-12 DIAGNOSIS — G89.29 CHRONIC BILATERAL LOW BACK PAIN WITH SCIATICA, SCIATICA LATERALITY UNSPECIFIED: Primary | ICD-10-CM

## 2019-11-12 DIAGNOSIS — R07.89 CHEST WALL PAIN: ICD-10-CM

## 2019-11-12 DIAGNOSIS — M62.81 MUSCLE WEAKNESS (GENERALIZED): ICD-10-CM

## 2019-11-12 DIAGNOSIS — M54.2 NECK PAIN: ICD-10-CM

## 2019-11-12 ASSESSMENT — MIFFLIN-ST. JEOR: SCORE: 1513.31

## 2019-11-12 NOTE — PROGRESS NOTES
"S: Srikanth Graves is a 74 year old male who returns for follow up of  Chronic back and neck pain, has long standing hip and knee problems   For several years  He has difficulty getting from  sitting to standing ,Especially he most difficult is getting up from sofas ad regular chairs    Patients states that main concern today is problem  With getting up from seating     PMHX/PSHX/MEDS/ALLERGIES/SHX/FHX reviewed and updated in Epic.      ROS:  General: No fevers, chills  Head: No headache  Ears: No acute change in hearing.    CV: No chest pain or palpitations.  Resp: No shortness of breath.  No cough. No hemoptysis.  GI: No nausea, vomiting, constipation, diarrhea  : No urinary pains    O: /64 (BP Location: Right arm)   Pulse 52   Temp 97  F (36.1  C) (Oral)   Resp 16   Ht 1.651 m (5' 5\")   Wt 83.6 kg (184 lb 6.4 oz)   SpO2 97%   BMI 30.69 kg/m     Gen:  Well nourished and in NAD  HEENT: PERRLA; TMs normal color and landmarks; nasopharynx pink and moist; oropharynx pink and moist  Left side blindness/prosthesis  Neck: supple without lymphadenopathy  CV:  RRR  - no murmurs, rubs, or gallups,   Pulm:  CTAB, no wheezes/rales/rhonchi, good air entry   ABD: soft, nontender, no masses, no rebound, BS intact throughout  Extrem: no cyanosis, edema or clubbing  Generalized weakness in extremities   Legs ROM 4/5   Strength 4/5   Observed difficulty getting up from sitting to sanding during clinic viists  Psych: Euthymic    Chronic low back pain   generalized muscle weakness   Leg arthritis   Paperwork  filled for lift chair    RTC I for regular care  or sooner if develops new or worsening symptoms.    Rohit Pastor MD      "

## 2019-11-15 ENCOUNTER — OFFICE VISIT (OUTPATIENT)
Dept: FAMILY MEDICINE | Facility: CLINIC | Age: 74
End: 2019-11-15
Payer: MEDICARE

## 2019-11-15 VITALS
WEIGHT: 183.4 LBS | HEART RATE: 55 BPM | OXYGEN SATURATION: 96 % | RESPIRATION RATE: 18 BRPM | BODY MASS INDEX: 30.56 KG/M2 | TEMPERATURE: 98.1 F | DIASTOLIC BLOOD PRESSURE: 73 MMHG | HEIGHT: 65 IN | SYSTOLIC BLOOD PRESSURE: 138 MMHG

## 2019-11-15 DIAGNOSIS — M54.42 CHRONIC BILATERAL LOW BACK PAIN WITH BILATERAL SCIATICA: Primary | ICD-10-CM

## 2019-11-15 DIAGNOSIS — M79.672 LEFT FOOT PAIN: ICD-10-CM

## 2019-11-15 DIAGNOSIS — M54.41 CHRONIC BILATERAL LOW BACK PAIN WITH BILATERAL SCIATICA: Primary | ICD-10-CM

## 2019-11-15 DIAGNOSIS — G89.29 CHRONIC BILATERAL LOW BACK PAIN WITH BILATERAL SCIATICA: Primary | ICD-10-CM

## 2019-11-15 ASSESSMENT — PAIN SCALES - GENERAL: PAINLEVEL: SEVERE PAIN (7)

## 2019-11-15 ASSESSMENT — MIFFLIN-ST. JEOR: SCORE: 1508.78

## 2019-11-15 NOTE — PATIENT INSTRUCTIONS
You will get call to schedule your CT scan of your spine.    I will follow-up with you once we have these results.    19  CT  [facility]  [phone]  Fax Orders to [phone]    Order faxed to [facility] Scheduling at [phone], they will contact patient to schedule.     2019   PHYSICAL THERAPY REFERRAL   Demographics and referral for Physical Therapy faxed to [facility] at [phone]. They will contact patient to schedule.     [facility]  Phone: [phone]  Fax: [phone]    Meghan Rosario

## 2019-11-15 NOTE — PROGRESS NOTES
"Preceptor attestation:  Vital signs reviewed: /73   Pulse 55   Temp 98.1  F (36.7  C) (Oral)   Resp 18   Ht 1.651 m (5' 5\")   Wt 83.2 kg (183 lb 6.4 oz)   SpO2 96%   BMI 30.52 kg/m      Patient seen, evaluated, and discussed with the resident.  I have verified the content of the note, which accurately reflects my assessment of the patient and the plan of care.    Supervising physician: Roz Burnham MD  [facility]  "

## 2019-11-15 NOTE — PROGRESS NOTES
[facility] Visit    Subjective:  Srikanth Graves is a 74 year old male with a PMHx significant for   Patient Active Problem List   Diagnosis     Other constipation     Advance care planning     Bunion of great toe     Degeneration of cervical intervertebral disc     Chronic airway obstruction (H)     Chronic low back pain     Chronic pain disorder     Chronic obstructive pulmonary disease (H)     Coronary artery disease of native artery of native heart with stable angina pectoris (H)     Depression     DJD (degenerative joint disease), ankle and foot     Dyspnea on exertion     Edema     Esophageal reflux     Essential hypertension     Headache disorder     Peripheral vascular disease (H)     Primary open angle glaucoma of right eye, mild stage     Vitamin D deficiency     Cocaine abuse in remission (H)     Coccydynia     Clavus     Eye globe prosthesis     Hallux valgus, acquired     Neck pain     Routine adult health maintenance     Controlled substance agreement terminated     Testicular cyst     Intermittent chest pain     Mild episode of recurrent major depressive disorder (H)     Notalgia paresthetica     NS (nuclear sclerosis), right     Opioid type dependence (H)     Other syndromes affecting cervical region     Personal history of nicotine dependence     Sacroiliitis, not elsewhere classified (H)     Serum creatinine raised     Tobacco use disorder     Unspecified glaucoma(365.9)    who presents with foot pain/swelling, leg pain and lower back pain.  Patient 1 month ago with left foot pain and swelling prescribed allopurinol for presumed gout.  Since that time patient says that the pain has actually improved as has the swelling, but still notes persistent pedal swelling.  Pain waxes and wanes is at the base of the left big toe.  He does not notice any swelling, redness or heat of the big toe.    He has had chronic leg and back pain for quite some time.  Describes the pain as  "dull/sometimes sharp pain that starts in the back of the legs and travels down to the knees bilaterally.  He has not noticed any weakness in the legs, no numbness or tingling.  He has not fallen.  He has no bowel or bladder incontinence or saddle anesthesia.  He also tells me that 25 to 30 years ago a traumatic event that resulted in left femoral fracture.  Notes that now his left leg is roughly 1 inch shorter than left.  He does have a lift shoe but does not use it saying \"out of the expect people to only wear one pair of shoes all the time\".        ROS:   A complete 12-point ROS was obtained and was negative except as stated above in HPI.    Objective:  Vitals:    11/15/19 1639   BP: 138/73   Pulse: 55   Resp: 18   Temp: 98.1  F (36.7  C)   TempSrc: Oral   SpO2: 96%   Weight: 83.2 kg (183 lb 6.4 oz)   Height: 1.651 m (5' 5\")     Body mass index is 30.52 kg/m .    GEN: NAD, healthy, alert  LEFT FOOT: I do not appreciate any swelling of the left foot.  2+ DP pulses.  No redness or swelling of the left great toe.  No tenderness with palpation.  No tenderness on axial grind test.  No pain with flexion or extension of the great toe.  No tenderness to palpation of left medial bunion.  EXT: Range of motion limited bilaterally due to pain and \"stiffness\".  Straight leg raise presumed negative, but difficult to perform due to pain and patient cannot really describe the pain is muscular strain or radicular.  No obvious deformity of left right leg.  BACK: Tenderness to palpation paralumbar areas bilaterally.  No central spinal tenderness.  Possible SI joint tenderness to palpation.  NEURO: Ambulates with limp  PSYCH: mentation appears normal, affect normal/bright    No results found for this or any previous visit (from the past 24 hour(s)).    Assessment/Plan:  Srikanth was seen today for recheck.    Diagnoses and all orders for this visit:    Chronic bilateral low back pain with bilateral sciatica  Patient with long history " of chronic low back pain, coccydynia, sacroiliitis here with continued low back pain and bilateral leg pain.  There are some symptoms that are concerning for possible disc herniation, but examination is difficult pain and overall poor range of motion of the patient.  Will obtain CT of spine; would for MRI but patient has unknown type of stenting that was done in the mid 2000.  I think to likely significantly contributing is his leg length discrepancy and his lack of use of left shoe.  I did tell him that he should start wearing his left shoe and if he needs a new one we need to get him set up with podiatry/prosthetics.  He is amenable to trying physical therapy.  -     CT Spine Complete w/o Contrast; Future  -     PHYSICAL THERAPY REFERRAL; Future    Left foot pain  Previously started on allopurinol which seems to have helped his symptoms.  My exam is not consistent with gout, but possibly symptoms and signs has resolved since starting allopurinol.  I also appreciate absolutely no swelling of the left foot.  I speculated perhaps left femoral injury also injured nearby vasculature.  Also explained to patient that vein valves become incompetent with age, especially in someone with vascular disease.  Provided reassurance.      Options for treatment and follow-up care were reviewed with the patient who was engaged and actively involved in the decision making process, verbalized understanding of the options discussed, and satisfied with the final plan.    Patient was staffed with supervising physician, Dr. Burnham.     Giovanni Galvez MD PGY2  [facility]

## 2019-11-19 ENCOUNTER — AMBULATORY - HEALTHEAST (OUTPATIENT)
Dept: ADMINISTRATIVE | Facility: REHABILITATION | Age: 74
End: 2019-11-19

## 2019-11-19 ENCOUNTER — RECORDS - HEALTHEAST (OUTPATIENT)
Dept: ADMINISTRATIVE | Facility: OTHER | Age: 74
End: 2019-11-19

## 2019-11-19 DIAGNOSIS — G89.29 CHRONIC BILATERAL LOW BACK PAIN WITH BILATERAL SCIATICA: ICD-10-CM

## 2019-11-19 DIAGNOSIS — N40.0 BENIGN PROSTATIC HYPERPLASIA WITHOUT LOWER URINARY TRACT SYMPTOMS: ICD-10-CM

## 2019-11-19 DIAGNOSIS — M54.42 CHRONIC BILATERAL LOW BACK PAIN WITH BILATERAL SCIATICA: ICD-10-CM

## 2019-11-19 DIAGNOSIS — M54.41 CHRONIC BILATERAL LOW BACK PAIN WITH BILATERAL SCIATICA: ICD-10-CM

## 2019-11-19 RX ORDER — TERAZOSIN 2 MG/1
2 CAPSULE ORAL AT BEDTIME
Qty: 30 CAPSULE | Refills: 11 | Status: SHIPPED | OUTPATIENT
Start: 2019-11-19

## 2019-12-03 ENCOUNTER — HOSPITAL ENCOUNTER (OUTPATIENT)
Dept: CT IMAGING | Facility: CLINIC | Age: 74
Discharge: HOME | End: 2019-12-03

## 2019-12-03 DIAGNOSIS — M54.41 CHRONIC BILATERAL LOW BACK PAIN WITH BILATERAL SCIATICA: ICD-10-CM

## 2019-12-03 DIAGNOSIS — M54.42 CHRONIC BILATERAL LOW BACK PAIN WITH BILATERAL SCIATICA: ICD-10-CM

## 2019-12-03 DIAGNOSIS — G89.29 CHRONIC BILATERAL LOW BACK PAIN WITH BILATERAL SCIATICA: ICD-10-CM

## 2019-12-11 ENCOUNTER — TRANSFERRED RECORDS (OUTPATIENT)
Dept: HEALTH INFORMATION MANAGEMENT | Facility: CLINIC | Age: 74
End: 2019-12-11

## 2019-12-13 ENCOUNTER — OFFICE VISIT - HEALTHEAST (OUTPATIENT)
Dept: PHYSICAL THERAPY | Facility: REHABILITATION | Age: 74
End: 2019-12-13

## 2019-12-13 DIAGNOSIS — M53.82 WEAKNESS OF NECK: ICD-10-CM

## 2019-12-13 DIAGNOSIS — R29.3 POOR POSTURE: ICD-10-CM

## 2019-12-13 DIAGNOSIS — M25.551 RIGHT HIP PAIN: ICD-10-CM

## 2019-12-13 DIAGNOSIS — M54.40 CHRONIC RIGHT-SIDED LOW BACK PAIN WITH SCIATICA, SCIATICA LATERALITY UNSPECIFIED: ICD-10-CM

## 2019-12-13 DIAGNOSIS — M62.81 MUSCLE WEAKNESS (GENERALIZED): ICD-10-CM

## 2019-12-13 DIAGNOSIS — M54.2 NECK PAIN ON LEFT SIDE: ICD-10-CM

## 2019-12-13 DIAGNOSIS — G89.29 CHRONIC INTRACTABLE HEADACHE, UNSPECIFIED HEADACHE TYPE: ICD-10-CM

## 2019-12-13 DIAGNOSIS — G89.4 CHRONIC PAIN SYNDROME: ICD-10-CM

## 2019-12-13 DIAGNOSIS — G89.29 CHRONIC RIGHT-SIDED LOW BACK PAIN WITH SCIATICA, SCIATICA LATERALITY UNSPECIFIED: ICD-10-CM

## 2019-12-13 DIAGNOSIS — R51.9 CHRONIC INTRACTABLE HEADACHE, UNSPECIFIED HEADACHE TYPE: ICD-10-CM

## 2019-12-13 DIAGNOSIS — R07.9 RIGHT-SIDED CHEST PAIN: ICD-10-CM

## 2019-12-13 DIAGNOSIS — M54.2 CERVICALGIA: ICD-10-CM

## 2019-12-16 ENCOUNTER — OFFICE VISIT - HEALTHEAST (OUTPATIENT)
Dept: PHYSICAL THERAPY | Facility: REHABILITATION | Age: 74
End: 2019-12-16

## 2019-12-16 DIAGNOSIS — R07.89 CHEST WALL PAIN, CHRONIC: ICD-10-CM

## 2019-12-16 DIAGNOSIS — M62.81 MUSCLE WEAKNESS (GENERALIZED): ICD-10-CM

## 2019-12-16 DIAGNOSIS — M53.82 WEAKNESS OF NECK: ICD-10-CM

## 2019-12-16 DIAGNOSIS — G89.29 CHRONIC INTRACTABLE HEADACHE, UNSPECIFIED HEADACHE TYPE: ICD-10-CM

## 2019-12-16 DIAGNOSIS — G89.4 CHRONIC PAIN SYNDROME: ICD-10-CM

## 2019-12-16 DIAGNOSIS — M54.2 NECK PAIN ON LEFT SIDE: ICD-10-CM

## 2019-12-16 DIAGNOSIS — G89.29 CHEST WALL PAIN, CHRONIC: ICD-10-CM

## 2019-12-16 DIAGNOSIS — M54.2 CERVICALGIA: ICD-10-CM

## 2019-12-16 DIAGNOSIS — M53.84 DECREASED ROM OF THORACIC SPINE: ICD-10-CM

## 2019-12-16 DIAGNOSIS — R51.9 CHRONIC INTRACTABLE HEADACHE, UNSPECIFIED HEADACHE TYPE: ICD-10-CM

## 2019-12-16 DIAGNOSIS — R29.3 POOR POSTURE: ICD-10-CM

## 2019-12-16 DIAGNOSIS — G89.29 CHRONIC RIGHT-SIDED LOW BACK PAIN WITH SCIATICA, SCIATICA LATERALITY UNSPECIFIED: ICD-10-CM

## 2019-12-16 DIAGNOSIS — R07.9 RIGHT-SIDED CHEST PAIN: ICD-10-CM

## 2019-12-16 DIAGNOSIS — M54.40 CHRONIC RIGHT-SIDED LOW BACK PAIN WITH SCIATICA, SCIATICA LATERALITY UNSPECIFIED: ICD-10-CM

## 2019-12-16 DIAGNOSIS — M94.0 COSTOCHONDRITIS: ICD-10-CM

## 2019-12-16 DIAGNOSIS — M25.551 RIGHT HIP PAIN: ICD-10-CM

## 2019-12-16 PROBLEM — D12.6 TUBULAR ADENOMA OF COLON: Status: ACTIVE | Noted: 2019-12-16

## 2019-12-17 ENCOUNTER — OFFICE VISIT (OUTPATIENT)
Dept: FAMILY MEDICINE | Facility: CLINIC | Age: 74
End: 2019-12-17
Payer: MEDICARE

## 2019-12-17 VITALS
WEIGHT: 182 LBS | TEMPERATURE: 99 F | HEIGHT: 65 IN | HEART RATE: 77 BPM | RESPIRATION RATE: 16 BRPM | SYSTOLIC BLOOD PRESSURE: 110 MMHG | OXYGEN SATURATION: 97 % | BODY MASS INDEX: 30.32 KG/M2 | DIASTOLIC BLOOD PRESSURE: 65 MMHG

## 2019-12-17 DIAGNOSIS — M10.072 ACUTE IDIOPATHIC GOUT INVOLVING TOE OF LEFT FOOT: Primary | ICD-10-CM

## 2019-12-17 RX ORDER — PREDNISONE 10 MG/1
TABLET ORAL
Qty: 38 TABLET | Refills: 0 | Status: SHIPPED | OUTPATIENT
Start: 2019-12-17 | End: 2019-12-18 | Stop reason: ALTCHOICE

## 2019-12-17 ASSESSMENT — MIFFLIN-ST. JEOR: SCORE: 1502.43

## 2019-12-17 NOTE — PATIENT INSTRUCTIONS
- Take Prednisone pills as prescribed  - Follow up in 2 weeks  - Discuss starting ARB for blood pressure instead of ACE-I for gout

## 2019-12-17 NOTE — PROGRESS NOTES
Preceptor Attestation:   Patient seen, evaluated and discussed with the resident. I have verified the content of the note, which accurately reflects my assessment of the patient and the plan of care.   Supervising Physician:  Hudson Rodriguze MD

## 2019-12-17 NOTE — PROGRESS NOTES
[facility] Visit    Subjective:  Srikanth Graves is a 74 year old male with a PMHx significant for   Patient Active Problem List   Diagnosis     Other constipation     Advance care planning     Bunion of great toe     Degeneration of cervical intervertebral disc     Chronic airway obstruction (H)     Chronic low back pain     Chronic pain disorder     Chronic obstructive pulmonary disease (H)     Coronary artery disease of native artery of native heart with stable angina pectoris (H)     Depression     DJD (degenerative joint disease), ankle and foot     Dyspnea on exertion     Edema     Esophageal reflux     Essential hypertension     Headache disorder     Peripheral vascular disease (H)     Primary open angle glaucoma of right eye, mild stage     Vitamin D deficiency     Cocaine abuse in remission (H)     Coccydynia     Clavus     Eye globe prosthesis     Hallux valgus, acquired     Neck pain     Routine adult health maintenance     Controlled substance agreement terminated     Testicular cyst     Intermittent chest pain     Mild episode of recurrent major depressive disorder (H)     Notalgia paresthetica     NS (nuclear sclerosis), right     Opioid type dependence (H)     Other syndromes affecting cervical region     Personal history of nicotine dependence     Sacroiliitis, not elsewhere classified (H)     Serum creatinine raised     Tobacco use disorder     Unspecified glaucoma(365.9)     Tubular adenoma of colon    who presents with left toe swelling.     Patient complains of acute left big toe pain and swelling starting last night.  No trauma, injury, fall, surgery at this joint, but does have a long history of acute gout affecting this left toe for the past 4 years.  Thinks this feels like another typical gout flare.  He admits to eating some steaks and other red meats but had been trying to cut back.  Has never drank alcohol he says.  He has been on daily ASA and lasix for several years.   "Quit smoking tobacco about 3 years ago.  Denies fevers, chills, systemic symptoms.  Having some trouble walking due to pain but otherwise no focal neuro deficits and bearing weight fine.  He thinks he's taking daily Allopurinol but he has several meds and isn't sure.  He says he's taking Colchicine reliably, and reports it's not helping but will continue to do so.      Last had gout of his left big toe in October 2019, and was given a script for Norco.  Had an xray of his left foot that found a hallux valgus with bunion but no fracture or other abnormalities.  He doesn't appear to have been prescribed any steroids in our EMR in the past; unclear as to why.     Objective:  Vitals:    12/17/19 0820   BP: 110/65   BP Location: Left arm   Pulse: 77   Resp: 16   Temp: 99  F (37.2  C)   TempSrc: Oral   SpO2: 97%   Weight: 82.6 kg (182 lb)   Height: 1.651 m (5' 5\")     Body mass index is 30.29 kg/m .    GEN: NAD, alert, pleasant  RESP: CTAB, no w/r/r  CV: RRR, nl S1/S2, no m/r/g  MSK: see picture below - there's a hallux valgus w/ bunion deformations bilaterally but there's some acute swelling of the left side with mild erythema/tenderness/warmth. No bruising or other skin abnormalities. Gait is steady with use of cane. Ankle/foot/other toes of left side are without any acute abnormalities.   SKIN: there is a partially ingrown toenail of his left big toe that doesn't appear acutely inflamed  NEURO: no obvious focal deficits, normal strength and tone of left foot, sensory exam grossly normal of feet bilaterally, mentation intact, speech normal          Assessment/Plan:  Srikanth was seen today for left toe pain.    Diagnoses and all orders for this visit:    Acute idiopathic gout involving toe of left foot  Exam impressive for swelling, warmth, mild erythema of left big toe c/w another gout flare.  Will try a prolonged taper of Prednisone 40mg for 5 days, then 30mg for 3 days, then 20mg for 3 days, then 10mg for 3 days for " this acute attack given his history of difficult to treat gout.  Since patient has no contraindications to steroids, will hold off steroid injection or opiates for now.  No imaging today; reassuring history and unremarkable XR about 2 months ago.  We emphasized the need to manage his diet and considered replacing his Lisinopril with Losartan, but he will return to discuss this.  Will also check a uric acid level at his next visit since it was 7.9 about 2 months ago.    -     predniSONE (DELTASONE) 10 MG tablet; Take 4 tablets (40 mg) by mouth daily for 5 days, THEN 3 tablets (30 mg) daily for 3 days, THEN 2 tablets (20 mg) daily for 3 days, THEN 1 tablet (10 mg) daily for 3 days.    Return in 2 weeks to discuss starting Losartan for gout prophylactic effect and discontinuing Lisinopril, working on a healthier gout-oriented diet and recheck uric acid with allopurinol titration.     Options for treatment and follow-up care were reviewed with the patient who was engaged and actively involved in the decision making process, verbalized understanding of the options discussed, and satisfied with the final plan.    Patient was staffed with supervising physician, Dr. Rodriguez.     Ernesto Olmstead MD, PGY3  [facility]

## 2019-12-18 ENCOUNTER — OFFICE VISIT (OUTPATIENT)
Dept: FAMILY MEDICINE | Facility: CLINIC | Age: 74
End: 2019-12-18
Payer: MEDICARE

## 2019-12-18 VITALS
SYSTOLIC BLOOD PRESSURE: 143 MMHG | OXYGEN SATURATION: 95 % | HEIGHT: 60 IN | TEMPERATURE: 99.1 F | DIASTOLIC BLOOD PRESSURE: 71 MMHG | HEART RATE: 64 BPM | RESPIRATION RATE: 20 BRPM | BODY MASS INDEX: 36.52 KG/M2 | WEIGHT: 186 LBS

## 2019-12-18 DIAGNOSIS — M10.072 ACUTE IDIOPATHIC GOUT INVOLVING TOE OF LEFT FOOT: ICD-10-CM

## 2019-12-18 DIAGNOSIS — R07.9 CHEST PAIN, UNSPECIFIED TYPE: Primary | ICD-10-CM

## 2019-12-18 LAB — TROPONIN I ADV: 0.01 NG/ML (ref 0–0.29)

## 2019-12-18 RX ORDER — COLCHICINE 0.6 MG/1
0.6 TABLET ORAL DAILY
Qty: 12 TABLET | Refills: 0 | Status: SHIPPED | OUTPATIENT
Start: 2019-12-18 | End: 2020-02-27

## 2019-12-18 ASSESSMENT — MIFFLIN-ST. JEOR: SCORE: 1441.19

## 2019-12-18 NOTE — PROGRESS NOTES
troponin     SUBJECTIVE       Srikanth Graves is a 74 year old  male with a PMHx significant for   Patient Active Problem List   Diagnosis     Other constipation     Advance care planning     Bunion of great toe     Degeneration of cervical intervertebral disc     Chronic airway obstruction (H)     Chronic low back pain     Chronic pain disorder     Chronic obstructive pulmonary disease (H)     Coronary artery disease of native artery of native heart with stable angina pectoris (H)     Depression     DJD (degenerative joint disease), ankle and foot     Dyspnea on exertion     Edema     Esophageal reflux     Essential hypertension     Headache disorder     Peripheral vascular disease (H)     Primary open angle glaucoma of right eye, mild stage     Vitamin D deficiency     Cocaine abuse in remission (H)     Coccydynia     Clavus     Eye globe prosthesis     Hallux valgus, acquired     Neck pain     Routine adult health maintenance     Controlled substance agreement terminated     Testicular cyst     Intermittent chest pain     Mild episode of recurrent major depressive disorder (H)     Notalgia paresthetica     NS (nuclear sclerosis), right     Opioid type dependence (H)     Other syndromes affecting cervical region     Personal history of nicotine dependence     Sacroiliitis, not elsewhere classified (H)     Serum creatinine raised     Tobacco use disorder     Unspecified glaucoma(365.9)     Tubular adenoma of colon     Who presents today with chest pain.    Srikanth has had chest pain since last evening. He noticed that he becomes short of breath while walking from his bedroom to living room. It started after taking prednisone for a gout flair. He states he is positive the prednisone caused the chest pain.      He describes the chest pain as a choking sensation which is the same feeling as when he had had a heart attack in the past. He additionally has had increased shortness of breath.     He took nitroglycerine  lingual spray twice today and once last night. The chest pain resolved after taking the nitroglycerine. He has not had to use nitroglycerine in the past 6 months otherwise.     No sweating or numbness down his left arm. No cough or fevers. No swelling in legs. No lightheadedness or dizziness. No headache or body aches.     He currently has no chest pain or shortness of breath.     He only wants something for his gout flair - he requests Vicodin which he received in October.       ROS: As stated in HPI.    Current medications include:   Current Outpatient Medications   Medication Sig Dispense Refill     colchicine (COLCYRS) 0.6 MG tablet Take 1 tablet (0.6 mg) by mouth daily May take one tablet every 8 hours for the first day, then only one tablet per day as needed for pain 12 tablet 0     nitroGLYcerin (NITROLINGUAL) 0.4 MG/SPRAY spray For chest pain spray 1 spray under tongue every 5 minutes for 3 doses. If symptoms persist 5 minutes after 1st dose call 911. 4.9 g 3     acetaminophen (TYLENOL) 500 MG tablet Take 1 tablet (500 mg) by mouth every 8 hours as needed for mild pain 100 tablet 1     albuterol (PROAIR HFA/PROVENTIL HFA/VENTOLIN HFA) 108 (90 BASE) MCG/ACT Inhaler INHALE TWO PUFFS BY MOUTH FOUR TIMES A DAY AS NEEDED 3 Inhaler 3     allopurinol (ZYLOPRIM) 300 MG tablet Take 1 tablet (300 mg) by mouth daily 30 tablet 5     amLODIPine (NORVASC) 10 MG tablet Take 1 tablet (10 mg) by mouth daily 90 tablet 3     aspirin (ASA) 81 MG tablet Take 1 tablet (81 mg) by mouth daily 90 tablet 3     bisacodyl (BISACODYL LAXATIVE) 5 MG EC tablet Take 2 tablets (10mg) as directed. 2 tablet 0     colchicine (COLCYRS) 0.6 MG tablet TAKE ONE TABLET BY MOUTH TWICE A DAY AS NEEDED FOR ACUTE GOUT PAIN 30 tablet 3     cyclobenzaprine (FLEXERIL) 5 MG tablet Take 1 tablet (5 mg) by mouth nightly as needed for muscle spasms 15 tablet 0     diphenhydrAMINE (BENADRYL) 25 MG capsule Take 1-2 capsules (25-50 mg) by mouth every 6 hours as  needed for itching or allergies 60 capsule 1     docusate sodium (COLACE) 100 MG tablet Take 1 tablet (100 mg) by mouth 2 times daily as needed for constipation 180 tablet 3     dorzolamide-timolol (COSOPT) 2-0.5 % ophthalmic solution Place 1 drop into the right eye twice daily per optometrist 1 Bottle 11     fluticasone-vilanterol (BREO ELLIPTA) 200-25 MCG/INH inhaler Inhale 1 puff into the lungs daily 3 Inhaler 3     furosemide (LASIX) 20 MG tablet Take 1 tablet (20 mg) by mouth daily 90 tablet 3     hydrALAZINE (APRESOLINE) 10 MG tablet Take 1 tablet (10 mg) by mouth 2 times daily 180 tablet 3     hydrocortisone (CORTAID) 1 % external cream Apply topically 2 times daily 30 g 0     ketoconazole (NIZORAL) 2 % external shampoo Apply topically daily 120 mL 1     lisinopril (PRINIVIL/ZESTRIL) 40 MG tablet Take 1 tablet (40 mg) by mouth daily 90 tablet 3     magnesium citrate solution Magnesium citrate 10 oz as directed. NO red liquids. 296 mL 0     metoprolol tartrate (LOPRESSOR) 100 MG tablet Take 0.5 tablets (50 mg) by mouth 2 times daily 90 tablet 3     Multiple Vitamin (TAB-A-MARZENA) TABS Take 1 tablet by mouth daily 90 tablet 3     polyethylene glycol (MIRALAX) packet Miralax powder 8.3 oz bottle (238 gm) as directed 238 g 0     polyvinyl alcohol (LIQUIFILM TEARS) 1.4 % ophthalmic solution Place 1 drop into the right eye as needed for dry eyes 15 mL 3     ranitidine (ZANTAC) 150 MG tablet Take 1 tablet (150 mg) by mouth daily 90 tablet 3     rosuvastatin (CRESTOR) 20 MG tablet Take 1 tablet (20 mg) by mouth daily 90 tablet 3     sertraline (ZOLOFT) 50 MG tablet Take 1 tablet (50 mg) by mouth daily 90 tablet 3     Skin Protectants, Misc. (HYDROCERIN) CREA Apply topically 3 times daily as needed for dry skin 228 g 3     terazosin (HYTRIN) 2 MG capsule Take 1 capsule (2 mg) by mouth At Bedtime 30 capsule 11     traZODone (DESYREL) 50 MG tablet Take 1/2 to 2 tablets by mouth at bedtime as needed for sleep. 180 tablet 3  "    umeclidinium (INCRUSE ELLIPTA) 62.5 MCG/INH inhaler Inhale 1 puff into the lungs daily 3 Inhaler 3     vitamin D3 (CHOLECALCIFEROL) 2000 units tablet Take 1 tablet by mouth daily 100 tablet 3       PMH, Medications and Allergies were reviewed and updated as needed.        OBJECTIVE     Vitals:    12/18/19 1518   BP: (!) 143/71   BP Location: Left arm   Patient Position: Sitting   Cuff Size: Adult Regular   Pulse: 64   Resp: 20   Temp: 99.1  F (37.3  C)   TempSrc: Oral   SpO2: 95%   Weight: 84.4 kg (186 lb)   Height: 1.524 m (5')     Body mass index is 36.33 kg/m .    Gen:  Sitting in exam chair, NAD  HEENT: Normocephalic, atraumatic.  Neck: Supple.   CV:  RRR. No murmurs, rubs, or gallops. Good peripheral pulses  Pulm:  CTAB, no wheezes, rales, or rhonchi  Abd: Soft, non-tender throughout   Extrem: Warm and well perfused. Strength appears normal  Left foot: Mild erythema without warmth of left toe     No results found for this or any previous visit (from the past 24 hour(s)).      ASSESSMENT AND PLAN     1. Chest pain, unspecified type  Srikanth developed chest pain yesterday evening that is worsened by exertion and relieved by nitroglycerine spray. He describes the pain as similar to when he had a heart attack in the past. He is convinced it was caused by prednisone which he started for a gout flair and thus recommended stopping it (see below). ECG in clinic showed NSR without acute ischemic changes. Given exertional chest pain relieved by nitroglycerine and that the pain is similar to his previous MI, I strongly suggested calling EMS and going to the emergency department. Srikanth was strongly opposed to this and stated he will not go to the ED because \"they will hold me forever\". He states he \"has chest pain all the time\" and all he wants is pain medications for his gout flair. I tried to reiterate that we can still treat the gout flair but that I am concerned about his heart, however, he continued to insist that " he is not going to the emergency department. He did agree to have a troponin checked and that if it is elevated he will agree to go to the ED. I signed out the patient to the night resident who will follow the troponin up and if elevated will call Srikanth and recommend going to the ED. Discussed return precautions and recommended to go to the ED immediately or call 911 if the chest pain recurs. He will follow up on Monday to check in - he did not want to follow up sooner.   - EKG 12-lead complete w/read - Clinics  - Troponin I ([facility])    2. Acute idiopathic gout involving toe of left foot  He was adamant that he wanted Vicodin for the gout flair as that is what he received in October. Do not feel that is necessary at this time and will try colchicine instead. He became very upset when I did not agree to give Vicodin but eventually agreed to try the colchicine. Recommended to stop the prednisone. Discussed that he should not use the ketoconazole shampoo while taking colchicine and he agreed to not do so.   - colchicine (COLCYRS) 0.6 MG tablet; Take 1 tablet (0.6 mg) by mouth daily May take one tablet every 8 hours for the first day, then only one tablet per day as needed for pain  Dispense: 12 tablet; Refill: 0    RTC in 4 days for follow up or sooner if develops new or worsening symptoms. Return precautions discussed.     Discussed with MD Anjum Chase, PGY3  [facility]

## 2019-12-18 NOTE — PROGRESS NOTES
Preceptor Attestation:   Patient seen, evaluated and discussed with the resident. I reviewed the ECG with Dr. Padilla. I have verified the content of the note, which accurately reflects my assessment of the patient and the plan of care.   Supervising Physician:  William Lee MD.

## 2019-12-18 NOTE — PATIENT INSTRUCTIONS
If you develop further chest pain go to the emergency room immediately or call 911.    If your troponin (heart enzyme) is elevated we will call you and recommend going to the emergency department.       Do not use the ketoconazole shampoo while using colchicine for gout.

## 2019-12-20 ENCOUNTER — OFFICE VISIT - HEALTHEAST (OUTPATIENT)
Dept: PHYSICAL THERAPY | Facility: REHABILITATION | Age: 74
End: 2019-12-20

## 2019-12-20 DIAGNOSIS — R07.9 RIGHT-SIDED CHEST PAIN: ICD-10-CM

## 2019-12-20 DIAGNOSIS — M54.2 NECK PAIN ON LEFT SIDE: ICD-10-CM

## 2019-12-20 DIAGNOSIS — M62.81 MUSCLE WEAKNESS (GENERALIZED): ICD-10-CM

## 2019-12-20 DIAGNOSIS — M54.2 CERVICALGIA: ICD-10-CM

## 2019-12-20 DIAGNOSIS — M53.82 WEAKNESS OF NECK: ICD-10-CM

## 2019-12-20 DIAGNOSIS — M25.551 RIGHT HIP PAIN: ICD-10-CM

## 2019-12-20 DIAGNOSIS — R29.3 POOR POSTURE: ICD-10-CM

## 2019-12-20 DIAGNOSIS — G89.4 CHRONIC PAIN SYNDROME: ICD-10-CM

## 2019-12-24 ENCOUNTER — OFFICE VISIT - HEALTHEAST (OUTPATIENT)
Dept: PHYSICAL THERAPY | Facility: REHABILITATION | Age: 74
End: 2019-12-24

## 2019-12-24 ENCOUNTER — OFFICE VISIT (OUTPATIENT)
Dept: FAMILY MEDICINE | Facility: CLINIC | Age: 74
End: 2019-12-24
Payer: MEDICARE

## 2019-12-24 VITALS
OXYGEN SATURATION: 97 % | HEART RATE: 63 BPM | TEMPERATURE: 98.8 F | HEIGHT: 60 IN | DIASTOLIC BLOOD PRESSURE: 70 MMHG | WEIGHT: 183.2 LBS | BODY MASS INDEX: 35.97 KG/M2 | SYSTOLIC BLOOD PRESSURE: 135 MMHG | RESPIRATION RATE: 16 BRPM

## 2019-12-24 DIAGNOSIS — M62.81 MUSCLE WEAKNESS (GENERALIZED): ICD-10-CM

## 2019-12-24 DIAGNOSIS — G89.4 CHRONIC PAIN SYNDROME: ICD-10-CM

## 2019-12-24 DIAGNOSIS — R29.3 POOR POSTURE: ICD-10-CM

## 2019-12-24 DIAGNOSIS — M54.2 NECK PAIN ON LEFT SIDE: ICD-10-CM

## 2019-12-24 DIAGNOSIS — M25.551 RIGHT HIP PAIN: ICD-10-CM

## 2019-12-24 DIAGNOSIS — R07.9 RIGHT-SIDED CHEST PAIN: ICD-10-CM

## 2019-12-24 DIAGNOSIS — M10.072 ACUTE IDIOPATHIC GOUT INVOLVING TOE OF LEFT FOOT: Primary | ICD-10-CM

## 2019-12-24 DIAGNOSIS — M19.072 ARTHRITIS OF LEFT FOOT: ICD-10-CM

## 2019-12-24 LAB
CRP SERPL-MCNC: 0.6 MG/DL (ref 0–0.8)
ERYTHROCYTE [SEDIMENTATION RATE] IN BLOOD: 26 MM/HR (ref 0–20)
URATE SERPL-MCNC: 7.2 MG/DL (ref 3–8)

## 2019-12-24 ASSESSMENT — MIFFLIN-ST. JEOR: SCORE: 1428.49

## 2019-12-24 NOTE — LETTER
December 26, 2019      Srikanth Graves  [address] [place] [address]        Dear Srikanth    ORTHOPEDICS ADULT REFERRAL  [facility]  Phone: [phone]  Fax: [phone]    [address], [address]  [place] [address]      Appointment:  Wednesday January 15, 2020  Arrival Time:  1:15 pm  Provider:  Dr. Price     Please bring in a copy of your insurance card and photo ID.    If you cannot make this appointment, please contact [facility] at [phone] to reschedule.    Sincerely,    Johnny Gonzalez MD

## 2019-12-24 NOTE — PROGRESS NOTES
Preceptor Attestation:   Patient seen, evaluated and discussed with the resident. I have verified the content of the note, which accurately reflects my assessment of the patient and the plan of care.   Supervising Physician:  Darwin Sparks MD.

## 2019-12-24 NOTE — PROGRESS NOTES
HPI     Srikanth Graves is a 74 year old  male with a PMH significant for:     Patient Active Problem List   Diagnosis     Other constipation     Advance care planning     Bunion of great toe     Degeneration of cervical intervertebral disc     Chronic airway obstruction (H)     Chronic low back pain     Chronic pain disorder     Chronic obstructive pulmonary disease (H)     Coronary artery disease of native artery of native heart with stable angina pectoris (H)     Depression     DJD (degenerative joint disease), ankle and foot     Dyspnea on exertion     Edema     Esophageal reflux     Essential hypertension     Headache disorder     Peripheral vascular disease (H)     Primary open angle glaucoma of right eye, mild stage     Vitamin D deficiency     Cocaine abuse in remission (H)     Coccydynia     Clavus     Eye globe prosthesis     Hallux valgus, acquired     Neck pain     Routine adult health maintenance     Controlled substance agreement terminated     Testicular cyst     Intermittent chest pain     Mild episode of recurrent major depressive disorder (H)     Notalgia paresthetica     NS (nuclear sclerosis), right     Opioid type dependence (H)     Other syndromes affecting cervical region     Personal history of nicotine dependence     Sacroiliitis, not elsewhere classified (H)     Serum creatinine raised     Tobacco use disorder     Unspecified glaucoma(365.9)     Tubular adenoma of colon     He presents today for follow up from an appointment on 12/17 with Dr. Olmstead and 12/18 with Dr. aPdilla. At this time was having exertional chest pain relieved by nitroglycerin spray but ECG and troponin obtained in clinic were negative. At this time he attributed this pain to recent steroids he was placed on secondary to gout flair and refused to go to the Emergency Department. Since this time the chest pain has gone away. He is quite certain that the steroid is what caused all of the presentation and was responsible  for the SOB. He is not short of breath today or having any chest pain, cough or URI symptoms. He does not have any concerns regarding this today and is feeling well - going to PT after this appointment today.     During the appointment on 12/18 Dr. Padilla started Mr. Graves on colchicine for acute idiopathic gout of the L foot. It appears that the patient did receive Vicodin for this in the past which he thinks is the only thing that works. He endorses throwing his steroids and colchicine in the garbage because he states that it doesn't work. Foot is still painful to dorsum of tarsal bone to tip of toe on left foot. Also some tenderness on the dorsum of the foot into the ankle. Sometimes the pressure of the sock or covers while he sleeps provides extreme tenderness and pain. Nothing seems to make this better, other than the narcotics as above.  He cannot walk half a block anymore.    PMH, Medications and Allergies were reviewed and updated as needed.    Current Outpatient Medications:      acetaminophen (TYLENOL) 500 MG tablet, Take 1 tablet (500 mg) by mouth every 8 hours as needed for mild pain, Disp: 100 tablet, Rfl: 1     albuterol (PROAIR HFA/PROVENTIL HFA/VENTOLIN HFA) 108 (90 BASE) MCG/ACT Inhaler, INHALE TWO PUFFS BY MOUTH FOUR TIMES A DAY AS NEEDED, Disp: 3 Inhaler, Rfl: 3     allopurinol (ZYLOPRIM) 300 MG tablet, Take 1 tablet (300 mg) by mouth daily, Disp: 30 tablet, Rfl: 5     amLODIPine (NORVASC) 10 MG tablet, Take 1 tablet (10 mg) by mouth daily, Disp: 90 tablet, Rfl: 3     aspirin (ASA) 81 MG tablet, Take 1 tablet (81 mg) by mouth daily, Disp: 90 tablet, Rfl: 3     bisacodyl (BISACODYL LAXATIVE) 5 MG EC tablet, Take 2 tablets (10mg) as directed., Disp: 2 tablet, Rfl: 0     cyclobenzaprine (FLEXERIL) 5 MG tablet, Take 1 tablet (5 mg) by mouth nightly as needed for muscle spasms, Disp: 15 tablet, Rfl: 0     diphenhydrAMINE (BENADRYL) 25 MG capsule, Take 1-2 capsules (25-50 mg) by mouth every 6 hours as  needed for itching or allergies, Disp: 60 capsule, Rfl: 1     docusate sodium (COLACE) 100 MG tablet, Take 1 tablet (100 mg) by mouth 2 times daily as needed for constipation, Disp: 180 tablet, Rfl: 3     dorzolamide-timolol (COSOPT) 2-0.5 % ophthalmic solution, Place 1 drop into the right eye twice daily per optometrist, Disp: 1 Bottle, Rfl: 11     fluticasone-vilanterol (BREO ELLIPTA) 200-25 MCG/INH inhaler, Inhale 1 puff into the lungs daily, Disp: 3 Inhaler, Rfl: 3     furosemide (LASIX) 20 MG tablet, Take 1 tablet (20 mg) by mouth daily, Disp: 90 tablet, Rfl: 3     hydrALAZINE (APRESOLINE) 10 MG tablet, Take 1 tablet (10 mg) by mouth 2 times daily, Disp: 180 tablet, Rfl: 3     hydrocortisone (CORTAID) 1 % external cream, Apply topically 2 times daily, Disp: 30 g, Rfl: 0     ketoconazole (NIZORAL) 2 % external shampoo, Apply topically daily, Disp: 120 mL, Rfl: 1     lisinopril (PRINIVIL/ZESTRIL) 40 MG tablet, Take 1 tablet (40 mg) by mouth daily, Disp: 90 tablet, Rfl: 3     magnesium citrate solution, Magnesium citrate 10 oz as directed. NO red liquids., Disp: 296 mL, Rfl: 0     metoprolol tartrate (LOPRESSOR) 100 MG tablet, Take 0.5 tablets (50 mg) by mouth 2 times daily, Disp: 90 tablet, Rfl: 3     Multiple Vitamin (TAB-A-MARZENA) TABS, Take 1 tablet by mouth daily, Disp: 90 tablet, Rfl: 3     nitroGLYcerin (NITROLINGUAL) 0.4 MG/SPRAY spray, For chest pain spray 1 spray under tongue every 5 minutes for 3 doses. If symptoms persist 5 minutes after 1st dose call 911., Disp: 4.9 g, Rfl: 3     polyethylene glycol (MIRALAX) packet, Miralax powder 8.3 oz bottle (238 gm) as directed, Disp: 238 g, Rfl: 0     polyvinyl alcohol (LIQUIFILM TEARS) 1.4 % ophthalmic solution, Place 1 drop into the right eye as needed for dry eyes, Disp: 15 mL, Rfl: 3     ranitidine (ZANTAC) 150 MG tablet, Take 1 tablet (150 mg) by mouth daily, Disp: 90 tablet, Rfl: 3     rosuvastatin (CRESTOR) 20 MG tablet, Take 1 tablet (20 mg) by mouth  daily, Disp: 90 tablet, Rfl: 3     sertraline (ZOLOFT) 50 MG tablet, Take 1 tablet (50 mg) by mouth daily, Disp: 90 tablet, Rfl: 3     Skin Protectants, Misc. (HYDROCERIN) CREA, Apply topically 3 times daily as needed for dry skin, Disp: 228 g, Rfl: 3     terazosin (HYTRIN) 2 MG capsule, Take 1 capsule (2 mg) by mouth At Bedtime, Disp: 30 capsule, Rfl: 11     traZODone (DESYREL) 50 MG tablet, Take 1/2 to 2 tablets by mouth at bedtime as needed for sleep., Disp: 180 tablet, Rfl: 3     umeclidinium (INCRUSE ELLIPTA) 62.5 MCG/INH inhaler, Inhale 1 puff into the lungs daily, Disp: 3 Inhaler, Rfl: 3     vitamin D3 (CHOLECALCIFEROL) 2000 units tablet, Take 1 tablet by mouth daily, Disp: 100 tablet, Rfl: 3     colchicine (COLCYRS) 0.6 MG tablet, Take 1 tablet (0.6 mg) by mouth daily May take one tablet every 8 hours for the first day, then only one tablet per day as needed for pain (Patient not taking: Reported on 12/24/2019), Disp: 12 tablet, Rfl: 0     colchicine (COLCYRS) 0.6 MG tablet, TAKE ONE TABLET BY MOUTH TWICE A DAY AS NEEDED FOR ACUTE GOUT PAIN (Patient not taking: Reported on 12/24/2019), Disp: 30 tablet, Rfl: 3    Review Of Systems  As above. Remaining ROS unremarkable.          Physical Exam:     Vitals:    12/24/19 0850   BP: 135/70   BP Location: Left arm   Pulse: 63   Resp: 16   Temp: 98.8  F (37.1  C)   TempSrc: Oral   SpO2: 97%   Weight: 83.1 kg (183 lb 3.2 oz)   Height: 1.524 m (5')     Body mass index is 35.78 kg/m .    Exam:  Constitutional: healthy, alert and no distress  Neck: Neck supple. No adenopathy appreciated. Midline trachea  HEENT: EOMI, conjunctiva are clear. No nasal discharge.  Cardiovascular :RRR. No murmurs, clicks gallops or rub  Respiratory:  normal respiratory rate and rhythm, lungs clear to auscultation. No wheezes or crackles.  Abdomen: +BS, soft, nontender, nondistended. No HSM.  Extremities: No C/C/E in BLE. 2+DP pulses. Both L and R foot notable for hallux valgus on first digit.  L  from base of tarsal joint to tip of toe, no erythema or warmness noted. No rash. Sensation and strength grossly intact. R foot without tenderness.  Skin: no rashes on exposed skin, excoriation noted on L foot c/w scatch  Psychiatric: mentation appears normal and affect normal/bright    Assessment and Plan     Srikanth was seen today for further evaluation of hallux valgus and acute idiopathic gout.    Diagnoses and all orders for this visit:    At this time suspect foot pain may be secondary to chronic arthritis and notable hallux valgus versus acute idiopathic gout as he has been dealing with this pain for approximately 3 months and finds no alleviation with prednisone, allopurinol or colchicine. Of note, the only thing that has provided him alleviation is opioid therapy. However, due to history of opioid use disorder and substance use disorder I do not feel he is a candidate for CPM with narcotics long term, despite patient being limited for pain medication options due to his CKD. He does not think that acetaminophen provides alleviation. However I encouraged him to continue tylenol therapy along with PT and ice/heat as needed. Will also try a trial of diclofenac cream and refer to [facility] for potential injection versus other arthritic intervention. Will also test inflammatory markers and uric acid today to rule out acute gout although his history and exam are less convincing of this today.    Acute idiopathic gout involving toe of left foot  -     Erythrocyte Sedimentation Rate ([facility])  -     C-Reactive Protein ([facility])  -     Uric Acid ([facility])    Arthritis of left foot  -     diclofenac (VOLTAREN) 1 % topical gel; Place 2 g onto the skin 4 times daily  -     ORTHOPEDICS ADULT REFERRAL; Future    RTC after [facility] referral for follow up or sooner if new or worsening symptoms.    Discussed with MD Johnny Schmitt MD PGY 1  [facility]  Medicine    Options for treatment and/or follow-up care were reviewed with the patient. Srikanth Graves was engaged and actively involved in the decision making process. He verbalized understanding of the options discussed and was satisfied with the final plan.

## 2019-12-24 NOTE — LETTER
January 21, 2020      Srikanth Graves  [address] [place] [address]        Dear Srikanth,    Below is your appointment I scheduled for you at [facility] for your foot.     [facility]  Phone: [phone]    [facility] [address], [address]  [place] [address]    Appointment: Monday January 27th  Arrival Time:  10:15am   Provider:  Dr. Gu    Please bring you insurance card and photo ID    Sincerely,    Meghan Rosario

## 2019-12-26 NOTE — PATIENT INSTRUCTIONS
ORTHOPEDICS ADULT REFERRAL  [facility]  Phone: [phone]  Fax: [phone]    [facility] [address], [address] [address]    Appointment:  Wednesday January 15, 2020  Arrival Time:  1:15 pm  Provider:  Dr. Price     Please bring in a copy of your insurance card and photo ID.    If you cannot make this appointment, please contact [facility] at [phone] to reschedule.    Demographics, referral, office note(s) and radiology reports faxed to [phone].  Keshia Bloom, JAY JAY    01/21/20 ADDENDUM   ABOVE APPT RESCHEDULED    [facility]  Phone: [phone]  Fax: [phone]    [address], [address] [address]    Appointment: Monday January 27th  Arrival Time:  10:15am   Provider:  Dr. Gu    Please bring you insurance card and photo ID

## 2020-01-02 ENCOUNTER — OFFICE VISIT (OUTPATIENT)
Dept: FAMILY MEDICINE | Facility: CLINIC | Age: 75
End: 2020-01-02
Payer: MEDICARE

## 2020-01-02 ENCOUNTER — OFFICE VISIT - HEALTHEAST (OUTPATIENT)
Dept: PHYSICAL THERAPY | Facility: REHABILITATION | Age: 75
End: 2020-01-02

## 2020-01-02 VITALS
WEIGHT: 184 LBS | SYSTOLIC BLOOD PRESSURE: 121 MMHG | TEMPERATURE: 97.7 F | BODY MASS INDEX: 35.94 KG/M2 | HEART RATE: 59 BPM | DIASTOLIC BLOOD PRESSURE: 65 MMHG | RESPIRATION RATE: 16 BRPM | OXYGEN SATURATION: 95 %

## 2020-01-02 DIAGNOSIS — M25.551 RIGHT HIP PAIN: ICD-10-CM

## 2020-01-02 DIAGNOSIS — M62.81 MUSCLE WEAKNESS (GENERALIZED): ICD-10-CM

## 2020-01-02 DIAGNOSIS — G89.29 CHRONIC INTRACTABLE HEADACHE, UNSPECIFIED HEADACHE TYPE: ICD-10-CM

## 2020-01-02 DIAGNOSIS — R07.89 CHEST WALL PAIN, CHRONIC: ICD-10-CM

## 2020-01-02 DIAGNOSIS — M94.0 COSTOCHONDRITIS: ICD-10-CM

## 2020-01-02 DIAGNOSIS — R29.3 POOR POSTURE: ICD-10-CM

## 2020-01-02 DIAGNOSIS — R51.9 CHRONIC INTRACTABLE HEADACHE, UNSPECIFIED HEADACHE TYPE: ICD-10-CM

## 2020-01-02 DIAGNOSIS — G89.29 CHEST WALL PAIN, CHRONIC: ICD-10-CM

## 2020-01-02 DIAGNOSIS — G89.29 CHRONIC RIGHT-SIDED LOW BACK PAIN WITH SCIATICA, SCIATICA LATERALITY UNSPECIFIED: ICD-10-CM

## 2020-01-02 DIAGNOSIS — M54.40 CHRONIC RIGHT-SIDED LOW BACK PAIN WITH SCIATICA, SCIATICA LATERALITY UNSPECIFIED: ICD-10-CM

## 2020-01-02 DIAGNOSIS — M54.2 CERVICALGIA: ICD-10-CM

## 2020-01-02 DIAGNOSIS — M53.84 DECREASED ROM OF THORACIC SPINE: ICD-10-CM

## 2020-01-02 DIAGNOSIS — M54.2 NECK PAIN ON LEFT SIDE: ICD-10-CM

## 2020-01-02 DIAGNOSIS — R07.9 RIGHT-SIDED CHEST PAIN: ICD-10-CM

## 2020-01-02 DIAGNOSIS — G89.4 CHRONIC PAIN SYNDROME: ICD-10-CM

## 2020-01-02 DIAGNOSIS — M53.82 WEAKNESS OF NECK: ICD-10-CM

## 2020-01-02 DIAGNOSIS — Z01.818 PREOP GENERAL PHYSICAL EXAM: Primary | ICD-10-CM

## 2020-01-02 NOTE — PROGRESS NOTES
[facility] [address] [place] [address]  Phone: [phone]  Fax: [phone]    1/2/2020    Adult PRE-OP Evaluation:    Srikanth Graves, 1945 presents for pre-operative evaluation and assessment as requested by Dr. Yassine Gonzalez, prior to undergoing surgery/procedure for treatment of  Cataract and glaucoma.    Proposed procedure: Excision cataract with lens placement of the right eye    Date of Surgery/ Procedure: 1/08/20  Hospital/Surgical Facility:  [facility] [address], [address], [place] [address]     Primary Physician: Rohit Pastor  Type of Anesthesia Anticipated: Local with MAC  History of anesthesia complications: NONE  History of  abnormal bleeding: NONE   History of blood transfusions: NO  Patient has a Health Care Directive or Living Will:  YES: Full code, performed during 2005.     Preoperative Questions   1. YES - Do you have a history of heart attack, stroke, stent, bypass or surgery on an artery in the head, neck, heart or legs? 2005 CABG x 3. MI in 1992. Performed at [facility]  2. NO - Do you ever have any pain or discomfort in your chest?  3. NO - Have you ever had a severe pain across the front of your chest lasting for half an hour or more?  4. NO - Do you have a history of Congestive Heart Failure?  5. NO - Are you troubled by shortness of breath when: walking on the level/ up a slight hill/ at night?  6. NO - Does your chest ever sound wheezy or whistling?  7. NO - Do you currently have a cold, bronchitis or other respiratory infection?  8. NO - Have you had a cold, bronchitis or other respiratory infection within the last 2 weeks?  9. NO - Do you usually have a cough?  10. YES - Do you sometimes get pains in the calves of your legs when you walk? Has chronic calf pain when walks. Has been happening for 2-3 years.   11. NO - Do you or anyone in your family have previous history of blood clots?  12. NO - Do you or does anyone in your family have a serious  bleeding problem such as prolonged bleeding following surgeries or cuts?  13. NO - Have you ever had problems with anemia or been told to take iron pills?  14. NO - Have you had any abnormal blood loss such as black, tarry or bloody stools, or abnormal vaginal bleeding?  15. NO - Have you ever had a blood transfusion?  16. NO - Have you or any of your relatives ever had problems with anesthesia?  17. NO - Do you have sleep apnea, excessive snoring or daytime drowsiness?  18. NO - Do you have any prosthetic heart valves?  19. NO - Do you have prosthetic joints?  20. NO - Is there any chance that you may be pregnant?    Patient Active Problem List   Diagnosis     Other constipation     Advance care planning     Bunion of great toe     Degeneration of cervical intervertebral disc     Chronic airway obstruction (H)     Chronic low back pain     Chronic pain disorder     Chronic obstructive pulmonary disease (H)     Coronary artery disease of native artery of native heart with stable angina pectoris (H)     Depression     DJD (degenerative joint disease), ankle and foot     Dyspnea on exertion     Edema     Esophageal reflux     Essential hypertension     Headache disorder     Peripheral vascular disease (H)     Primary open angle glaucoma of right eye, mild stage     Vitamin D deficiency     Cocaine abuse in remission (H)     Coccydynia     Clavus     Eye globe prosthesis     Hallux valgus, acquired     Neck pain     Routine adult health maintenance     Controlled substance agreement terminated     Testicular cyst     Intermittent chest pain     Mild episode of recurrent major depressive disorder (H)     Notalgia paresthetica     NS (nuclear sclerosis), right     Opioid type dependence (H)     Other syndromes affecting cervical region     Personal history of nicotine dependence     Sacroiliitis, not elsewhere classified (H)     Serum creatinine raised     Tobacco use disorder     Unspecified glaucoma(365.9)     Tubular  adenoma of colon       acetaminophen (TYLENOL) 500 MG tablet, Take 1 tablet (500 mg) by mouth every 8 hours as needed for mild pain  albuterol (PROAIR HFA/PROVENTIL HFA/VENTOLIN HFA) 108 (90 BASE) MCG/ACT Inhaler, INHALE TWO PUFFS BY MOUTH FOUR TIMES A DAY AS NEEDED  allopurinol (ZYLOPRIM) 300 MG tablet, Take 1 tablet (300 mg) by mouth daily  amLODIPine (NORVASC) 10 MG tablet, Take 1 tablet (10 mg) by mouth daily  aspirin (ASA) 81 MG tablet, Take 1 tablet (81 mg) by mouth daily  bisacodyl (BISACODYL LAXATIVE) 5 MG EC tablet, Take 2 tablets (10mg) as directed.  colchicine (COLCYRS) 0.6 MG tablet, Take 1 tablet (0.6 mg) by mouth daily May take one tablet every 8 hours for the first day, then only one tablet per day as needed for pain (Patient not taking: Reported on 12/24/2019)  colchicine (COLCYRS) 0.6 MG tablet, TAKE ONE TABLET BY MOUTH TWICE A DAY AS NEEDED FOR ACUTE GOUT PAIN (Patient not taking: Reported on 12/24/2019)  cyclobenzaprine (FLEXERIL) 5 MG tablet, Take 1 tablet (5 mg) by mouth nightly as needed for muscle spasms  diclofenac (VOLTAREN) 1 % topical gel, Place 2 g onto the skin 4 times daily  diphenhydrAMINE (BENADRYL) 25 MG capsule, Take 1-2 capsules (25-50 mg) by mouth every 6 hours as needed for itching or allergies  docusate sodium (COLACE) 100 MG tablet, Take 1 tablet (100 mg) by mouth 2 times daily as needed for constipation  dorzolamide-timolol (COSOPT) 2-0.5 % ophthalmic solution, Place 1 drop into the right eye twice daily per optometrist  fluticasone-vilanterol (BREO ELLIPTA) 200-25 MCG/INH inhaler, Inhale 1 puff into the lungs daily  furosemide (LASIX) 20 MG tablet, Take 1 tablet (20 mg) by mouth daily  hydrALAZINE (APRESOLINE) 10 MG tablet, Take 1 tablet (10 mg) by mouth 2 times daily  hydrocortisone (CORTAID) 1 % external cream, Apply topically 2 times daily  ketoconazole (NIZORAL) 2 % external shampoo, Apply topically daily  lisinopril (PRINIVIL/ZESTRIL) 40 MG tablet, Take 1 tablet (40 mg)  by mouth daily  magnesium citrate solution, Magnesium citrate 10 oz as directed. NO red liquids.  metoprolol tartrate (LOPRESSOR) 100 MG tablet, Take 0.5 tablets (50 mg) by mouth 2 times daily  Multiple Vitamin (TAB-A-MARZENA) TABS, Take 1 tablet by mouth daily  nitroGLYcerin (NITROLINGUAL) 0.4 MG/SPRAY spray, For chest pain spray 1 spray under tongue every 5 minutes for 3 doses. If symptoms persist 5 minutes after 1st dose call 911.  polyethylene glycol (MIRALAX) packet, Miralax powder 8.3 oz bottle (238 gm) as directed  polyvinyl alcohol (LIQUIFILM TEARS) 1.4 % ophthalmic solution, Place 1 drop into the right eye as needed for dry eyes  ranitidine (ZANTAC) 150 MG tablet, Take 1 tablet (150 mg) by mouth daily  rosuvastatin (CRESTOR) 20 MG tablet, Take 1 tablet (20 mg) by mouth daily  sertraline (ZOLOFT) 50 MG tablet, Take 1 tablet (50 mg) by mouth daily  Skin Protectants, Misc. (HYDROCERIN) CREA, Apply topically 3 times daily as needed for dry skin  terazosin (HYTRIN) 2 MG capsule, Take 1 capsule (2 mg) by mouth At Bedtime  traZODone (DESYREL) 50 MG tablet, Take 1/2 to 2 tablets by mouth at bedtime as needed for sleep.  umeclidinium (INCRUSE ELLIPTA) 62.5 MCG/INH inhaler, Inhale 1 puff into the lungs daily  vitamin D3 (CHOLECALCIFEROL) 2000 units tablet, Take 1 tablet by mouth daily    No current facility-administered medications on file prior to visit.       OTC products: None, except as noted above    Allergies   Allergen Reactions     Gabapentin      Other reaction(s): Abnormal Behavior  Per pain clinic - fell, also angry/mood changes. Bad enough that family called police.  Other reaction(s): Abnormal Behavior  Per pain clinic - fell, also angry/mood changes. Bad enough that family called police.     Nitroglycerin Other (See Comments)     Pills only - can use spray.  Other reaction(s): Headache  Headache only with the pills, not the spray  Other reaction(s): Headache  uses spray instead  Other reaction(s):  Headache  uses spray instead  Annotation: Headache for 2 weeks    Headache only with the pills, not the spray  uses spray instead  Pills only - can use spray.  Pills only - can use spray.  Other reaction(s): Headache  Headache only with the pills, not the spray  Other reaction(s): Headache  uses spray instead    Other reaction(s): Headache  Annotation: Headache for 2 weeks    Headache only with the pills, not the spray  uses spray instead  Pills only - can use spray.  Pills only - can use spray.  Other reaction(s): Headache  Headache only with the pills, not the spray  Other reaction(s): Headache  uses spray instead     Latex Allergy: NO    Social History     Socioeconomic History     Marital status:      Spouse name: None     Number of children: None     Years of education: None     Highest education level: None   Occupational History     None   Social Needs     Financial resource strain: None     Food insecurity:     Worry: None     Inability: None     Transportation needs:     Medical: None     Non-medical: None   Tobacco Use     Smoking status: Former Smoker     Packs/day: 1.00     Types: Cigarettes     Last attempt to quit: 11/28/2016     Years since quitting: 3.0     Smokeless tobacco: Never Used   Substance and Sexual Activity     Alcohol use: No     Drug use: No     Sexual activity: Yes     Partners: Female   Lifestyle     Physical activity:     Days per week: None     Minutes per session: None     Stress: None   Relationships     Social connections:     Talks on phone: None     Gets together: None     Attends Jew service: None     Active member of club or organization: None     Attends meetings of clubs or organizations: None     Relationship status: None     Intimate partner violence:     Fear of current or ex partner: None     Emotionally abused: None     Physically abused: None     Forced sexual activity: None   Other Topics Concern     None   Social History Narrative     None       REVIEW  OF SYSTEMS:   Constitutional, HEENT, cardiovascular, pulmonary, GI, , musculoskeletal, neuro, skin, endocrine and psych systems are negative, except as otherwise noted.    EXAM:     Patient Vitals for the past 24 hrs:   BP Temp Temp src Pulse Resp SpO2 Height Weight   01/02/20 0846 121/65 97.7  F (36.5  C) Oral 59 16 95 % -- 83.5 kg (184 lb)     Body mass index is 35.94 kg/m .  GENERAL: healthy, alert and no distress  EYES: Cataract in right eye. 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)  HENT: ear canals- normal; TMs- normal; Nose- normal; Mouth- no ulcers, no lesions  NECK: no tenderness, no adenopathy, no asymmetry, no masses, no stiffness; thyroid- normal to palpation  RESP: lungs clear to auscultation - no rales, no rhonchi, no wheezes  CV: regular rates and rhythm, normal S1 S2, no S3 or S4 and no murmur, no click or rub -  ABDOMEN: soft, no tenderness, no  hepatosplenomegaly, no masses, normal bowel sounds  MS: extremities- no gross deformities noted, no edema  SKIN: no suspicious lesions, no rashes  NEURO: strength and tone- normal, sensory exam- grossly normal, mentation- intact, speech- normal, reflexes- symmetric  BACK: no CVA tenderness, moderate paralumbar tenderness throughout  PSYCH: Alert and oriented times 3; speech- coherent , normal rate and volume; able to articulate logical thoughts  LYMPHATICS: ant. cervical- normal, post. cervical- normal    DIAGNOSTICS:      No labs or EKG required for low risk surgery (cataract, skin procedure, breast biopsy, etc)    RISK ASSESSMENT:     Cardiovascular Risk:  -Patient is able to participate in strenuous activities without chest pain.  -The patient does not have chest pain.  -Patient does not have a history of congestive heart failure.    -The patient does not have a history of stroke and does not have a history of valvular disease.  - History of MI in 1992 and CABG x 3 in 2005. No other cardiac events noted since then.     Pulmonary Risk:  -In terms of  risk factors for pulmonary complication, the patient is older then 60    Perioperative Complications:  -The patient does not have a history of bleeding or clotting problems in the past.    -The patient has not had complications from surgeries.    -The patient does not have a family history of any anesthesia or surgical complications.      IMPRESSION:   Reason for surgery/procedure: Glaucoma, cataract    The proposed surgical procedure is considered LOW risk.    For above listed surgery and anesthesia:   Patient is at low risk for surgery/procedure and perioperative/procedure complications.    RECOMMENDATIONS:     Labs:  No labs today    Preop Plan:  --Approval given to proceed with proposed procedure, without further diagnostic evaluation  --Patient is currently taking lisinopril 40mg daily  Anticoagulant or Antiplatelet Medication Use  Bleeding risk is low for this procedure (e.g. dental, skin, cataract)      Cardiovascular Risk  -History of calf pain with walking.  -History of CABG x 3 in 2005.     --Patient is taking an  Ace inhibitor or Angiotensin Receptor Blocker (ARB) and should continue to take this medication as prescribed.     Medications:  Patient should take their regular medications the morning of surgery unless otherwise instructed.      Note for ophthalmologist:   -Patient is on terazosin which is an alpha blocker. This can increase risk of FLOPPY IRIS SYNDROME in patients undergoing cataract surgery. Patient will continue to take given no evidence of benefit to stop prior to surgery.      Patient and plan discussed with [facility] attending, Dr. Yevgeniy Moore.     Alexandrea Pérez MD PGY2  [facility]    Please contact our office if there are any further questions or information required about this patient.

## 2020-01-02 NOTE — NURSING NOTE
PHQ9  Zoster- reminded   Medicare Annual Wellness Visit- reminded   Hepatitis C Screening     [facility]   DOS: 1/08/20   Type: cataract surgery right eye  Dr. Gonzalez  Address: [address], [place] [address]  Phone: [phone]  Fax: [phone]

## 2020-01-02 NOTE — PATIENT INSTRUCTIONS
Can take all medications day of surgery as prescribed.    Happy New Year!  Take care,    Dr. Pérez  Presurgery Checklist  You are scheduled to have surgery. The healthcare staff will try to make your stay comfortable. Use the guidelines below to remind yourself what to do before surgery. Be sure to follow any specific pre-op instructions from your surgeon or nurse.   Preparing for Surgery  Ask your surgeon if you ll need a blood transfusion during surgery and if so, how to prepare for it. In some cases, you can donate blood before surgery. If needed, this blood can be given back (transfused) to you during or after surgery.  If you are having abdominal surgery, ask what you need to do to clear your bowel.  Tell your surgeon if you have allergies to any medications or foods.  Arrange for an adult family member or friend to drive you home after surgery. If possible, have someone ready to help you at home as you recover.  Call the surgeon if you get a cold, fever, sore throat, diarrhea, or other health problem just before surgery. Your surgeon can decide whether or not to postpone the surgery.  Medications  Tell your surgeon about all medications you take, including prescription and over-the-counter products such as herbal remedies and vitamins. Ask if you should continue taking them.  If you take ibuprofen, naproxen, or  blood thinners  such as aspirin, clopidogrel (Plavix), or warfarin (Coumadin), ask your surgeon whether you should stop taking them and how long before surgery you should stop.  You may be told to take antibiotics just before surgery to prevent infection. If so, follow instructions carefully on how to take them.  If you are told to take medications called anticoagulants to prevent blood clots after surgery, be sure to follow the instructions on how to take them.  Stop Smoking  If you smoke, healing may take longer. So at least 2 week(s) before surgery, stop smoking.  Bathing or Showering Before  Surgery  If instructed, wash with antibacterial soap. Afterward, do not use lotions or powders.  If you are having surgery on the head, you may be asked to shampoo with antibacterial soap. Follow instructions for doing so.  Do Not Remove Hair from the Surgery Site  Do not shave hair from the incision site, unless you are given specific instructions to do so. Usually, if hair needs to be removed, it will be done at the hospital right before surgery.  Don t Eat or Drink  Your doctor will tell you when to stop eating and drinking. If you do not follow your doctor's instructions, your procedure may be postponed or rescheduled for another day.  If your surgeon tells you to continue any medications, take them with small sips of water.  You can brush your teeth and rinse your mouth, but don t swallow any water.  Day of Surgery  Do not wear makeup. Do not use perfume, deodorant, or hairspray. Remove nail polish and artificial nails.  Leave jewelry (including rings), watches, and other valuables at home.  Be sure to bring health insurance cards or forms and a photo ID.  Bring a list of your medications (include the name, dose, how often you take them, and the time last dose was taken).  Arrive on time at the hospital or surgery facility.

## 2020-01-24 ENCOUNTER — OFFICE VISIT - HEALTHEAST (OUTPATIENT)
Dept: PHYSICAL THERAPY | Facility: REHABILITATION | Age: 75
End: 2020-01-24

## 2020-01-24 DIAGNOSIS — M54.2 NECK PAIN ON LEFT SIDE: ICD-10-CM

## 2020-01-24 DIAGNOSIS — M62.81 MUSCLE WEAKNESS (GENERALIZED): ICD-10-CM

## 2020-01-24 DIAGNOSIS — R29.3 POOR POSTURE: ICD-10-CM

## 2020-01-24 DIAGNOSIS — M25.551 RIGHT HIP PAIN: ICD-10-CM

## 2020-01-24 DIAGNOSIS — G89.4 CHRONIC PAIN SYNDROME: ICD-10-CM

## 2020-01-24 DIAGNOSIS — R07.9 RIGHT-SIDED CHEST PAIN: ICD-10-CM

## 2020-01-24 DIAGNOSIS — M54.2 CERVICALGIA: ICD-10-CM

## 2020-01-27 ENCOUNTER — TELEPHONE (OUTPATIENT)
Dept: FAMILY MEDICINE | Facility: CLINIC | Age: 75
End: 2020-01-27

## 2020-01-27 DIAGNOSIS — M79.673 FOOT PAIN: Primary | ICD-10-CM

## 2020-01-27 DIAGNOSIS — M79.673 FOOT PAIN: ICD-10-CM

## 2020-01-27 LAB
CRP SERPL-MCNC: 0.6 MG/DL (ref 0–0.8)
ERYTHROCYTE [DISTWIDTH] IN BLOOD BY AUTOMATED COUNT: 14.3 % (ref 11–14.5)
ERYTHROCYTE [SEDIMENTATION RATE] IN BLOOD: 33 MM/HR (ref 0–15)
HCT VFR BLD AUTO: 40.7 % (ref 40–54)
HGB BLD-MCNC: 13.1 G/DL (ref 14–18)
MCH RBC QN AUTO: 28.2 PG (ref 27–34)
MCHC RBC AUTO-ENTMCNC: 32.2 G/DL (ref 32–36)
MCV RBC AUTO: 88 FL (ref 80–100)
PLATELET # BLD AUTO: 265 THOU/UL (ref 140–440)
PMV BLD AUTO: 10.3 FL (ref 8.5–12.5)
RBC # BLD AUTO: 4.65 MILL/UL (ref 4.4–6.2)
URATE SERPL-MCNC: 7.5 MG/DL (ref 3–8)
WBC # BLD AUTO: 5.1 THOU/UL (ref 4–11)

## 2020-01-27 NOTE — TELEPHONE ENCOUNTER
[facility] phone call message- general phone call:    Reason for call:     Call back to Dr. Klever Gu    Action desired:     From Dr. Pastor only    Return call needed: Yes    OK to leave a message on voice mail? Yes    Advised patient to response may take up to 2 business days: Yes    Primary language: English      needed? No    Call taken on January 27, 2020 at 11:52 AM by Britta Rose CMA

## 2020-01-28 ENCOUNTER — OFFICE VISIT - HEALTHEAST (OUTPATIENT)
Dept: PHYSICAL THERAPY | Facility: REHABILITATION | Age: 75
End: 2020-01-28

## 2020-01-28 ENCOUNTER — DOCUMENTATION ONLY (OUTPATIENT)
Dept: FAMILY MEDICINE | Facility: CLINIC | Age: 75
End: 2020-01-28

## 2020-01-28 DIAGNOSIS — M54.2 NECK PAIN ON LEFT SIDE: ICD-10-CM

## 2020-01-28 DIAGNOSIS — M53.82 WEAKNESS OF NECK: ICD-10-CM

## 2020-01-28 DIAGNOSIS — G89.29 CHRONIC INTRACTABLE HEADACHE, UNSPECIFIED HEADACHE TYPE: ICD-10-CM

## 2020-01-28 DIAGNOSIS — R07.89 CHEST WALL PAIN, CHRONIC: ICD-10-CM

## 2020-01-28 DIAGNOSIS — M54.40 CHRONIC RIGHT-SIDED LOW BACK PAIN WITH SCIATICA, SCIATICA LATERALITY UNSPECIFIED: ICD-10-CM

## 2020-01-28 DIAGNOSIS — G89.4 CHRONIC PAIN SYNDROME: ICD-10-CM

## 2020-01-28 DIAGNOSIS — R07.9 RIGHT-SIDED CHEST PAIN: ICD-10-CM

## 2020-01-28 DIAGNOSIS — G89.29 CHRONIC RIGHT-SIDED LOW BACK PAIN WITH SCIATICA, SCIATICA LATERALITY UNSPECIFIED: ICD-10-CM

## 2020-01-28 DIAGNOSIS — M62.81 MUSCLE WEAKNESS (GENERALIZED): ICD-10-CM

## 2020-01-28 DIAGNOSIS — M25.551 RIGHT HIP PAIN: ICD-10-CM

## 2020-01-28 DIAGNOSIS — M54.2 CERVICALGIA: ICD-10-CM

## 2020-01-28 DIAGNOSIS — R29.3 POOR POSTURE: ICD-10-CM

## 2020-01-28 DIAGNOSIS — M53.84 DECREASED ROM OF THORACIC SPINE: ICD-10-CM

## 2020-01-28 DIAGNOSIS — G89.29 CHEST WALL PAIN, CHRONIC: ICD-10-CM

## 2020-01-28 DIAGNOSIS — M94.0 COSTOCHONDRITIS: ICD-10-CM

## 2020-01-28 DIAGNOSIS — R51.9 CHRONIC INTRACTABLE HEADACHE, UNSPECIFIED HEADACHE TYPE: ICD-10-CM

## 2020-01-28 NOTE — PROGRESS NOTES
From:  [address]  [address]  [phone]  Fax [phone]      To: [facility], [facility]    Attn: Klever Riccardo    Fax Number: [phone]    Date: 1/28/2020    RE: Srikanth Graves 1945 MRN [id number]      RESULTS FOR YOUR REVIEW:    Orders Only on 01/27/2020   Component Date Value Ref Range Status     C-Reactive Protein 01/27/2020 0.6  0.0 - 0.8 mg/dL Final     Sed Rate 01/27/2020 33* 0 - 15 mm/hr Final     WBC 01/27/2020 5.1  4.0 - 11.0 thou/uL Final     RBC 01/27/2020 4.65  4.40 - 6.20 mill/uL Final     Hemoglobin 01/27/2020 13.1* 14.0 - 18.0 g/dL Final     Hematocrit 01/27/2020 40.7  40.0 - 54.0 % Final     MCV 01/27/2020 88  80 - 100 fL Final     MCH 01/27/2020 28.2  27.0 - 34.0 pg Final     MCHC 01/27/2020 32.2  32.0 - 36.0 g/dL Final     RDW 01/27/2020 14.3  11.0 - 14.5 % Final     Platelets 01/27/2020 265  140 - 440 thou/uL Final     Mean Platelet Volume 01/27/2020 10.3  8.5 - 12.5 fL Final     Uric Acid 01/27/2020 7.5  3.0 - 8.0 mg/dL Final       COMMENTS:         Rebecca,  Rohit Pastor    Results faxed by Nayeli Rose

## 2020-01-31 ENCOUNTER — COMMUNICATION - HEALTHEAST (OUTPATIENT)
Dept: PHYSICAL THERAPY | Facility: REHABILITATION | Age: 75
End: 2020-01-31

## 2020-02-05 ENCOUNTER — OFFICE VISIT - HEALTHEAST (OUTPATIENT)
Dept: PHYSICAL THERAPY | Facility: REHABILITATION | Age: 75
End: 2020-02-05

## 2020-02-05 DIAGNOSIS — M54.2 NECK PAIN ON LEFT SIDE: ICD-10-CM

## 2020-02-05 DIAGNOSIS — M54.2 CERVICALGIA: ICD-10-CM

## 2020-02-05 DIAGNOSIS — M62.81 MUSCLE WEAKNESS (GENERALIZED): ICD-10-CM

## 2020-02-05 DIAGNOSIS — G89.4 CHRONIC PAIN SYNDROME: ICD-10-CM

## 2020-02-05 DIAGNOSIS — R29.3 POOR POSTURE: ICD-10-CM

## 2020-02-05 DIAGNOSIS — R07.9 RIGHT-SIDED CHEST PAIN: ICD-10-CM

## 2020-02-05 DIAGNOSIS — M25.551 RIGHT HIP PAIN: ICD-10-CM

## 2020-02-07 DIAGNOSIS — F32.A DEPRESSION, UNSPECIFIED DEPRESSION TYPE: ICD-10-CM

## 2020-02-07 NOTE — TELEPHONE ENCOUNTER
Request refill sent for Clopidogrel Bisulfate 75 mg, not in patient's current list.  Please advise.

## 2020-02-11 ENCOUNTER — OFFICE VISIT (OUTPATIENT)
Dept: FAMILY MEDICINE | Facility: CLINIC | Age: 75
End: 2020-02-11
Payer: MEDICARE

## 2020-02-11 VITALS
WEIGHT: 180.2 LBS | TEMPERATURE: 97.9 F | DIASTOLIC BLOOD PRESSURE: 71 MMHG | SYSTOLIC BLOOD PRESSURE: 144 MMHG | OXYGEN SATURATION: 98 % | BODY MASS INDEX: 35.19 KG/M2 | HEART RATE: 72 BPM | RESPIRATION RATE: 20 BRPM

## 2020-02-11 DIAGNOSIS — M46.1 SACROILIITIS, NOT ELSEWHERE CLASSIFIED (H): ICD-10-CM

## 2020-02-11 DIAGNOSIS — I73.9 PERIPHERAL VASCULAR DISEASE (H): ICD-10-CM

## 2020-02-11 DIAGNOSIS — F33.0 MILD EPISODE OF RECURRENT MAJOR DEPRESSIVE DISORDER (H): ICD-10-CM

## 2020-02-11 DIAGNOSIS — M10.072 ACUTE IDIOPATHIC GOUT OF LEFT FOOT: Primary | ICD-10-CM

## 2020-02-11 DIAGNOSIS — F11.220 OPIOID DEPENDENCE WITH UNCOMPLICATED INTOXICATION (H): ICD-10-CM

## 2020-02-11 DIAGNOSIS — J42 CHRONIC BRONCHITIS, UNSPECIFIED CHRONIC BRONCHITIS TYPE (H): ICD-10-CM

## 2020-02-11 DIAGNOSIS — N18.30 CKD (CHRONIC KIDNEY DISEASE) STAGE 3, GFR 30-59 ML/MIN (H): ICD-10-CM

## 2020-02-11 DIAGNOSIS — I25.118 CORONARY ARTERY DISEASE OF NATIVE ARTERY OF NATIVE HEART WITH STABLE ANGINA PECTORIS (H): ICD-10-CM

## 2020-02-11 RX ORDER — INDOMETHACIN 50 MG/1
50 CAPSULE ORAL
Qty: 21 CAPSULE | Refills: 0 | Status: SHIPPED | OUTPATIENT
Start: 2020-02-11 | End: 2020-02-27 | Stop reason: ALTCHOICE

## 2020-02-11 NOTE — LETTER
RETURN TO WORK/SCHOOL FORM    2/11/2020    Re: Srikanth Graves  1945      To Whom It May Concern:     Srikanth Graves has multiple medical problems especially back problem and foot problems. He has trouble walking up stairs/ steps. Would recommend a place that will accommodate him for the specific apartment with no stairs and bathroom accomodation.     Any questions please contact [facility] at [phone].              Rohit Pastor MD  2/11/2020 10:09 AM

## 2020-02-11 NOTE — PROGRESS NOTES
S: Srikanth Graves is a 74 year old male who returns for follow up of  Left foot pain, just saw  podiatry, to have a MRI a And follow up : 'xray's/blood work  All normal  his report'  2 neck pain/fu with neck and spine; Hx of fusion and radiculopathy  3 Paper for apartment accommodations  Patients states that main concern today is  See above    PMHX/PSHX/MEDS/ALLERGIES/SHX/FHX reviewed and updated in Epic.      ROS:  General: No fevers, chills  Head: No headache  Ears: No acute change in hearing.    CV: No chest pain or palpitations.  Resp: No shortness of breath.  No cough. No hemoptysis.  GI: No nausea, vomiting, constipation, diarrhea  : No urinary pains    O: BP (!) 144/71   Pulse 72   Temp 97.9  F (36.6  C) (Oral)   Resp 20   Wt 81.7 kg (180 lb 3.2 oz)   SpO2 98%   BMI 35.19 kg/m     Gen:  Well nourished and in NAD    Neck: supple without lymphadenopathy  CV:  RRR  - no murmurs, rubs, or gallups,   Pulm:  CTAB, no wheezes/rales/rhonchi, good air entry   ABD: soft, nontender, no masses, no rebound, BS intact throughout  Extrem: no cyanosis, edema or clubbing  Psych: Euthymic    Left toe/MTP tender/swollen  Uric acid 7.5/ 1/27/20   1 Left FOOT PAIN,consistent with gout  Not taken gout meds colchicine or allopurinol/'make me sick'  Cant take steroid/allergies   Plan: indocin, to have MRI   to follow-up with podiatry  2 Neck radiculopathy: at base line has radicular pain but has been stable Had recent neck MRI/ at base line  3 Back pain/ foot pain/decreased ADLs:  Uses scooter and lift chair  Letter to have accomodation  For housing  Total of 25  minutes was spent in face to face contact with patient with > 50% in counseling and coordination of care.  Options for treatment and/or follow-up care were reviewed with the patient. Srikanth Graves was engaged and actively involved in the decision making process. He verbalized understanding of the options discussed and was satisfied with the final plan.    RTC 2  weeks, for follow up of gout or sooner if develops new or worsening symptoms.    Rohit Pastor MD

## 2020-02-11 NOTE — PATIENT INSTRUCTIONS
LETTER FOR ACCOMODATION  TO HELP AMBULATE  AND AVOID STAIRS  AND STEPS   ALSO ACCOMODATION FOR BATHROOM   Follow-up WITH FOOT DOCTOR   I MA TREATING YOU FOR GOUT   INDOMETHACIN 50 MG THREE  TIMES PER DAY WITH FOODS, AS GETTING  BETTER YOU CAN TAKEN TWICE , THEN ONCE A DAY AND  STOP PROBABLY BY FIFTH DAY

## 2020-02-14 ENCOUNTER — TELEPHONE (OUTPATIENT)
Dept: FAMILY MEDICINE | Facility: CLINIC | Age: 75
End: 2020-02-14

## 2020-02-14 NOTE — TELEPHONE ENCOUNTER
Pharmacy sent a Rx for Clopidogrel 75 mg tablets but it is not on patients medication list please advise. NOELLE FeltonA

## 2020-02-14 NOTE — TELEPHONE ENCOUNTER
Indomethacin 50 mg caps are not covered please send an alternative. The pharmacy gave 2 alternatives they are; Naproxen 375 mg and Nabumetone 500 mg please advise. JAY JAY Felton

## 2020-02-18 ENCOUNTER — OFFICE VISIT - HEALTHEAST (OUTPATIENT)
Dept: PHYSICAL THERAPY | Facility: REHABILITATION | Age: 75
End: 2020-02-18

## 2020-02-18 DIAGNOSIS — R29.3 POOR POSTURE: ICD-10-CM

## 2020-02-18 DIAGNOSIS — M54.2 CERVICALGIA: ICD-10-CM

## 2020-02-18 DIAGNOSIS — M54.40 CHRONIC RIGHT-SIDED LOW BACK PAIN WITH SCIATICA, SCIATICA LATERALITY UNSPECIFIED: ICD-10-CM

## 2020-02-18 DIAGNOSIS — G89.4 CHRONIC PAIN SYNDROME: ICD-10-CM

## 2020-02-18 DIAGNOSIS — M25.551 RIGHT HIP PAIN: ICD-10-CM

## 2020-02-18 DIAGNOSIS — G89.29 CHRONIC RIGHT-SIDED LOW BACK PAIN WITH SCIATICA, SCIATICA LATERALITY UNSPECIFIED: ICD-10-CM

## 2020-02-18 DIAGNOSIS — R07.9 RIGHT-SIDED CHEST PAIN: ICD-10-CM

## 2020-02-18 DIAGNOSIS — M54.2 NECK PAIN ON LEFT SIDE: ICD-10-CM

## 2020-02-18 DIAGNOSIS — M53.82 WEAKNESS OF NECK: ICD-10-CM

## 2020-02-18 DIAGNOSIS — M62.81 MUSCLE WEAKNESS (GENERALIZED): ICD-10-CM

## 2020-02-19 ENCOUNTER — TELEPHONE (OUTPATIENT)
Dept: FAMILY MEDICINE | Facility: CLINIC | Age: 75
End: 2020-02-19

## 2020-02-19 NOTE — TELEPHONE ENCOUNTER
Request refill sent for Clopidogrel Bisulfate 75 mg, not in patient's medication list, please advise

## 2020-02-26 ENCOUNTER — OFFICE VISIT (OUTPATIENT)
Dept: FAMILY MEDICINE | Facility: CLINIC | Age: 75
End: 2020-02-26
Payer: MEDICARE

## 2020-02-26 VITALS
HEART RATE: 61 BPM | BODY MASS INDEX: 35.15 KG/M2 | SYSTOLIC BLOOD PRESSURE: 120 MMHG | WEIGHT: 180 LBS | TEMPERATURE: 98.8 F | RESPIRATION RATE: 22 BRPM | DIASTOLIC BLOOD PRESSURE: 59 MMHG | OXYGEN SATURATION: 95 %

## 2020-02-26 DIAGNOSIS — H40.9 UNSPECIFIED GLAUCOMA(365.9): ICD-10-CM

## 2020-02-26 DIAGNOSIS — B35.3 TINEA PEDIS OF BOTH FEET: Primary | ICD-10-CM

## 2020-02-26 DIAGNOSIS — M10.072 ACUTE IDIOPATHIC GOUT OF LEFT FOOT: ICD-10-CM

## 2020-02-26 RX ORDER — PRENATAL VIT 91/IRON/FOLIC/DHA 28-975-200
COMBINATION PACKAGE (EA) ORAL 2 TIMES DAILY
Qty: 24 G | Refills: 1 | Status: SHIPPED | OUTPATIENT
Start: 2020-02-26 | End: 2020-04-02

## 2020-02-26 NOTE — LETTER
February 27, 2020      Srikanth Graves  [address] [place] [address]        Dear Srikanth, please take this letter to Dr Coles    From Dr Gonzalez/eye doctor  There is one tiny blocked blood vessel in the back of the R eye.  This may be related to your blood pressure.     Dr. Coles to check the retina   03/12/2020 Appointment Ophthalmology Sharyn, Hugo WORLEY MD    [address] [place] [address] [phone] (Fax)       You take  3 blood pressure pills:Lisinopril 40 mg, amlodipine 10 mg and hydralazine 10 mg BID This regimen has given you good blood  pressure control     I could change hydralazine to different  blood pressure  Medication, Since you told me  that Dr Gonzalez mentioned  the tiny blood clot in the back your eye can be due  to one of your blood pressure medications and I suspect might hydralazine   Please ask Catherine Paredes about this issue with blood pressure mediation    Sincerely,    Rohit Pastor MD

## 2020-02-26 NOTE — PATIENT INSTRUCTIONS
Refill meds for foot fungus    For gout and nerve pain/naproxen  Use hearing aids    From Dr Gonzalez/eye doctor  There is one tiny blocked blood vessel in the back of the R eye.  This may be related to your blood pressure.     Dr. Coles to check the retina   03/12/2020 Appointment Ophthalmology Sharyn, Hugo WORLEY MD    [address] [place] [address]    [phone] (Fax)       You take  3 blood pressure pills:Lisinopril 40 mg, amlodipine 10 mg and hydralazine 10 mg BID This regimen has given you good blood  pressure control     I could change hydralazine to different  blood pressure  Medication, Since you told me  that Dr Gonzalez mentioned  the tiny blood clot in the back your eye can be due  to one of your blood pressure medications and I suspect might hydralazine   Please ask Catherine Paredes about this issue with blood pressure mediation

## 2020-02-26 NOTE — PROGRESS NOTES
S: Srikanth Graves is a 74 year old male who returns for follow up of  Foot pain/fungal and gout more left that right now  not taken gout meds now  2 Hearing. Has hearing aid for al log time but not used them  3 Right eye problem/recent eye exam and needs to follow-up with retina specialist  Patients states that main concern today is follow-up gout and foot fungal infection    PMHX/PSHX/MEDS/ALLERGIES/SHX/FHX reviewed and updated in Epic.  Left eye enucleation  ROS:  General: No fevers, chills  Head: No headache  Ears: No acute change in hearing.    CV: No chest pain or palpitations.  Resp: No shortness of breath.  No cough. No hemoptysis.  GI: No nausea, vomiting, constipation, diarrhea  : No urinary pains    O: /59 (BP Location: Right arm, Patient Position: Sitting, Cuff Size: Adult Large)   Pulse 61   Temp 98.8  F (37.1  C) (Oral)   Resp 22   Wt 81.6 kg (180 lb)   SpO2 95%   BMI 35.15 kg/m     Gen:  Well nourished and in NAD    Neck: supple without lymphadenopathy  CV:  RRR  - no murmurs, rubs, or gallups,   Pulm:  CTAB, no wheezes/rales/rhonchi, good air entry   ABD: soft, nontender, no masses, no rebound, BS intact throughout  Extrem: no cyanosis, edema or clubbing  Psych: Euthymic    Foot macerations between toes    Foot exam:Joint deformities    1. Tenia pedis: refill antifungals    2.  Gout/pressure nerve pain: does not want to take gout preventive meds         Naproxen refilled    3 Right S/Pcataract/refraction updated       IOL & iStent        4 Right     Moderate open  Glaucoma : CoSpot R  eye drops               Needs to follow-up with retina specialists Samm    ? discontinue hydralazine after retina exam     Srikanth  verbalized understanding of the options discussed and was satisfied with the final plan.    RTC in 12 weeks, for follow up of coordinate or sooner if develops new or worsening symptoms.    Rohit Pastor MD

## 2020-03-05 ENCOUNTER — TELEPHONE (OUTPATIENT)
Dept: FAMILY MEDICINE | Facility: CLINIC | Age: 75
End: 2020-03-05

## 2020-03-05 ENCOUNTER — OFFICE VISIT (OUTPATIENT)
Dept: FAMILY MEDICINE | Facility: CLINIC | Age: 75
End: 2020-03-05
Payer: MEDICARE

## 2020-03-05 VITALS
WEIGHT: 177.2 LBS | TEMPERATURE: 98.3 F | DIASTOLIC BLOOD PRESSURE: 64 MMHG | BODY MASS INDEX: 34.61 KG/M2 | OXYGEN SATURATION: 95 % | SYSTOLIC BLOOD PRESSURE: 111 MMHG | HEART RATE: 52 BPM | RESPIRATION RATE: 14 BRPM

## 2020-03-05 DIAGNOSIS — B35.3 TINEA PEDIS OF BOTH FEET: ICD-10-CM

## 2020-03-05 DIAGNOSIS — I87.2 VENOUS STASIS DERMATITIS OF LEFT LOWER EXTREMITY: ICD-10-CM

## 2020-03-05 DIAGNOSIS — M10.072 ACUTE IDIOPATHIC GOUT OF LEFT FOOT: Primary | ICD-10-CM

## 2020-03-05 DIAGNOSIS — L03.116 CELLULITIS OF LEFT LOWER EXTREMITY: ICD-10-CM

## 2020-03-05 DIAGNOSIS — I73.9 PERIPHERAL VASCULAR DISEASE (H): ICD-10-CM

## 2020-03-05 DIAGNOSIS — M79.672 LEFT FOOT PAIN: ICD-10-CM

## 2020-03-05 RX ORDER — CEPHALEXIN 500 MG/1
500 CAPSULE ORAL 4 TIMES DAILY
Qty: 40 CAPSULE | Refills: 0 | Status: SHIPPED | OUTPATIENT
Start: 2020-03-05 | End: 2020-04-02

## 2020-03-05 RX ORDER — CYCLOBENZAPRINE HCL 10 MG
10 TABLET ORAL 3 TIMES DAILY PRN
Qty: 30 TABLET | Refills: 1 | Status: SHIPPED | OUTPATIENT
Start: 2020-03-05 | End: 2020-04-02

## 2020-03-05 RX ORDER — TRIAMCINOLONE ACETONIDE 1 MG/G
CREAM TOPICAL 2 TIMES DAILY
Qty: 45 G | Refills: 1 | Status: SHIPPED | OUTPATIENT
Start: 2020-03-05 | End: 2020-06-05

## 2020-03-05 NOTE — TELEPHONE ENCOUNTER
[facility] phone call message- medication clarification/question:    Full Medication Name    Cyclobenzaprine      Question:     Pt was seen today in clinic and is requesting Cyclobenzaprine.       Pharmacy confirmed as [address] - [place], [place] - [address]: Yes    OK to leave a message on voice mail? Yes    Primary language: English      needed? No    Call taken on March 5, 2020 at 10:49 AM by Britta Rose CMA

## 2020-03-05 NOTE — PATIENT INSTRUCTIONS
"Difficulties since mid December; LEFT foot/leg pains.   20 minutes spent helping Mr. Graves articulate. He is frustrated providers have not acknowledged his issues.  Has had \"gout\" in big toe joint. Also know to have arthritis here. Both these are better today, but still bad nerve pain. Pain shoots down to to tip of big toe. (also back of neck around ear)    Check of .pharmacy reveals fairly regular narcotic Rx dispensing. Rececnt oxycodone from [facility]. Vic[place] x2 from [place] late fall. Tylenol \"doesn't help\"; NSAID's high risk and Gabapentin allergy.   Mr. Graves had difficulties describing the circumstances surrounding Vicoden/oxy; He could not excatly recall getting a Rx for narcotic from [facility], which pain he took them for, if he took them during the day or night, it they helped the pain and for how long, if there are any pills remaining in the bottle, if there was any side effects.    When asked what the pill looked like that worked best, Had been given \"brown round pill in past few months from [place]. That helped. Could get some rest. Further review show the effective Rx was flexeril.    1) Call back with name of medication. Ask to speak to Dr. Coelho's nurse.  Srikanth called back. The effective pill was flexeril. Rx e-prescribed for this.  2) Cream for shin, use as needed for itch  3) Start antibiotic capsules    Recheck next week  "

## 2020-03-06 DIAGNOSIS — J30.2 SEASONAL ALLERGIC RHINITIS, UNSPECIFIED TRIGGER: ICD-10-CM

## 2020-03-06 NOTE — PROGRESS NOTES
"       SUBJECTIVE       Srikanth Graves is a 74 year old  male with a PMH significant for:     Patient Active Problem List   Diagnosis     Other constipation     Advance care planning     Bunion of great toe     Degeneration of cervical intervertebral disc     Chronic airway obstruction (H)     Chronic low back pain     Chronic pain disorder     Chronic obstructive pulmonary disease (H)     Coronary artery disease of native artery of native heart with stable angina pectoris (H)     Depression     DJD (degenerative joint disease), ankle and foot     Dyspnea on exertion     Edema     Esophageal reflux     Essential hypertension     Headache disorder     Peripheral vascular disease (H)     Primary open angle glaucoma of right eye, mild stage     Vitamin D deficiency     Cocaine abuse in remission (H)     Coccydynia     Clavus     Eye globe prosthesis     Hallux valgus, acquired     Neck pain     Routine adult health maintenance     Controlled substance agreement terminated     Testicular cyst     Intermittent chest pain     Mild episode of recurrent major depressive disorder (H)     Notalgia paresthetica     NS (nuclear sclerosis), right     Opioid type dependence (H)     Other syndromes affecting cervical region     Personal history of nicotine dependence     Sacroiliitis, not elsewhere classified (H)     Serum creatinine raised     Tobacco use disorder     Tubular adenoma of colon     CKD (chronic kidney disease) stage 3, GFR 30-59 ml/min (H)     Difficulties since mid December; LEFT foot/leg pains.   20 minutes spent helping Mr. Graves articulate. He is frustrated providers have not acknowledged his issues.  Has had \"gout\" in big toe joint. Also know to have arthritis here. Both these are better today, but still bad nerve pain. Pain shoots down to to tip of big toe. (also back of neck around ear)    Check of .pharmacy reveals fairly regular narcotic Rx dispensing. Rececnt oxycodone from [facility]. [place]ietta " "x2 from [place] late fall. Tylenol \"doesn't help\"; NSAID's high risk and Gabapentin allergy.   Mr. Graves had difficulties describing the circumstances surrounding Vicoden/oxy; He could not excatly recall getting a Rx for narcotic from [facility], which pain he took them for, if he took them during the day or night, it they helped the pain and for how long, if there are any pills remaining in the bottle, if there was any side effects.    When asked what the pill looked like that worked best, Had been given \"brown round pill in past few months from [place]. That helped. Could get some rest. Further review show the effective Rx was flexeril    PMH, Medications and Allergies were reviewed and updated as needed.        REVIEW OF SYSTEMS     General: No fevers, chills, sweats, unexplained weight loss  Head: No headache  Neck: No swallowing problems   CV: No chest pain or palpitations  Resp: No shortness of breath.  No cough. No hemoptysis.  GI: No constipation, diarrhea, or blood in stool.  no nausea or vomiting  : No pain passing urine or urinary frequency            OBJECTIVE     Vitals:    03/05/20 0826   BP: 111/64   BP Location: Left arm   Patient Position: Sitting   Cuff Size: Adult Regular   Pulse: 52   Resp: 14   Temp: 98.3  F (36.8  C)   TempSrc: Oral   SpO2: 95%   Weight: 80.4 kg (177 lb 3.2 oz)     Body mass index is 34.61 kg/m .    Gen:  Looks stated age. Not acutely ill.  HEENT:Sclera non-icteric  Neck: supple without lymphadenopathy  CV:  Perfusion adequate  Pulm:  Fair air entry   ABD: soft, nontender, no masses, no rebound, BS intact throughout  Extrem: 6cm by 14 cm region of erythema, warmth RIGHT shin. Marked line from ED noted. Dermatitis vs active cellulitis  Psych: Somewhat frustrated initially, but grateful after being allowed 20 minute monolog.     No results found for this or any previous visit (from the past 24 hour(s)).        ASSESSMENT AND PLAN     Srikanth was seen today for other and " "medication reconciliation.    Diagnoses and all orders for this visit:    Acute idiopathic gout of left foot    Tinea pedis of both feet    Peripheral vascular disease (H)    Left foot pain  -     cyclobenzaprine (FLEXERIL) 10 MG tablet; Take 1 tablet (10 mg) by mouth 3 times daily as needed for muscle spasms or other (Foot Nerve Pain)    Venous stasis dermatitis of left lower extremity  -     triamcinolone (KENALOG) 0.1 % external cream; Apply topically 2 times daily    Cellulitis of left lower extremity  -     cephALEXin (KEFLEX) 500 MG capsule; Take 1 capsule (500 mg) by mouth 4 times daily        Patient Instructions   Difficulties since mid December; LEFT foot/leg pains.   20 minutes spent helping Mr. Graves articulate. He is frustrated providers have not acknowledged his issues.  Has had \"gout\" in big toe joint. Also know to have arthritis here. Both these are better today, but still bad nerve pain. Pain shoots down to to tip of big toe. (also back of neck around ear)    Check of .pharmacy reveals fairly regular narcotic Rx dispensing. Rececnt oxycodone from [facility]. Vicoden x2 from [place] late fall. Tylenol \"doesn't help\"; NSAID's high risk and Gabapentin allergy.   Mr. Graves had difficulties describing the circumstances surrounding Vicoden/oxy; He could not excatly recall getting a Rx for narcotic from [facility], which pain he took them for, if he took them during the day or night, it they helped the pain and for how long, if there are any pills remaining in the bottle, if there was any side effects.    When asked what the pill looked like that worked best, Had been given \"brown round pill in past few months from [place]. That helped. Could get some rest. Further review show the effective Rx was flexeril.    1) Call back with name of medication. Ask to speak to Dr. Coelho's nurse.  Srikanth called back. The effective pill was flexeril. Rx e-prescribed for this.  2) Cream for shin, use as needed " for itch  3) Start antibiotic capsules    Recheck next week      Total of 30 minutes was spent in face to face contact with patient with > 50% in counseling and coordination of care.  Options for treatment and/or follow-up care were reviewed with the patient. Srikanth Graves was engaged and actively involved in the decision making process. He verbalized understanding of the options discussed and was satisfied with the final plan.    To ED if signs of systemic infection develop.    John Coelho MD

## 2020-03-09 RX ORDER — DIPHENHYDRAMINE HCL 25 MG
25-50 CAPSULE ORAL EVERY 6 HOURS PRN
Qty: 60 CAPSULE | Refills: 1 | Status: SHIPPED | OUTPATIENT
Start: 2020-03-09 | End: 2020-05-14

## 2020-03-17 ENCOUNTER — DOCUMENTATION ONLY (OUTPATIENT)
Dept: FAMILY MEDICINE | Facility: CLINIC | Age: 75
End: 2020-03-17

## 2020-04-02 ENCOUNTER — VIRTUAL VISIT (OUTPATIENT)
Dept: FAMILY MEDICINE | Facility: CLINIC | Age: 75
End: 2020-04-02
Payer: MEDICARE

## 2020-04-02 VITALS — SYSTOLIC BLOOD PRESSURE: 149 MMHG | BODY MASS INDEX: 34.18 KG/M2 | DIASTOLIC BLOOD PRESSURE: 57 MMHG | WEIGHT: 175 LBS

## 2020-04-02 DIAGNOSIS — B35.3 TINEA PEDIS OF BOTH FEET: ICD-10-CM

## 2020-04-02 DIAGNOSIS — M79.672 LEFT FOOT PAIN: ICD-10-CM

## 2020-04-02 RX ORDER — CYCLOBENZAPRINE HCL 10 MG
10 TABLET ORAL 3 TIMES DAILY PRN
Qty: 30 TABLET | Refills: 3 | Status: SHIPPED | OUTPATIENT
Start: 2020-04-02 | End: 2020-04-03

## 2020-04-02 RX ORDER — PRENATAL VIT 91/IRON/FOLIC/DHA 28-975-200
COMBINATION PACKAGE (EA) ORAL 2 TIMES DAILY PRN
Qty: 30 G | Refills: 1 | Status: SHIPPED | OUTPATIENT
Start: 2020-04-02 | End: 2020-06-05

## 2020-04-02 NOTE — PROGRESS NOTES
"Family Medicine Telephone Visit Note               Telephone Visit Consent   Patient was verbally read the following and verbal consent was obtained.  \"I understand that I may revoke this request for a phone visit at any time.  This consent will automatically  3 months from the signed date and time.\"    Name person giving consent:  Patient   Date verbal consent given:  2020  Time verbal consent given:  8:54 AM          No chief complaint on file.    Current Outpatient Medications   Medication Sig Dispense Refill     albuterol (PROAIR HFA/PROVENTIL HFA/VENTOLIN HFA) 108 (90 BASE) MCG/ACT Inhaler INHALE TWO PUFFS BY MOUTH FOUR TIMES A DAY AS NEEDED 3 Inhaler 3     amLODIPine (NORVASC) 10 MG tablet Take 1 tablet (10 mg) by mouth daily 90 tablet 3     aspirin (ASA) 81 MG tablet Take 1 tablet (81 mg) by mouth daily 90 tablet 3     bisacodyl (BISACODYL LAXATIVE) 5 MG EC tablet Take 2 tablets (10mg) as directed. 2 tablet 0     cephALEXin (KEFLEX) 500 MG capsule Take 1 capsule (500 mg) by mouth 4 times daily 40 capsule 0     cyclobenzaprine (FLEXERIL) 10 MG tablet Take 1 tablet (10 mg) by mouth 3 times daily as needed for muscle spasms or other (Foot Nerve Pain) 30 tablet 1     diphenhydrAMINE (BENADRYL) 25 MG capsule Take 1-2 capsules (25-50 mg) by mouth every 6 hours as needed for itching or allergies 60 capsule 1     docusate sodium (COLACE) 100 MG tablet Take 1 tablet (100 mg) by mouth 2 times daily as needed for constipation 180 tablet 3     dorzolamide-timolol (COSOPT) 2-0.5 % ophthalmic solution Place 1 drop into the right eye twice daily per optometrist 1 Bottle 11     fluticasone-vilanterol (BREO ELLIPTA) 200-25 MCG/INH inhaler Inhale 1 puff into the lungs daily 3 Inhaler 3     furosemide (LASIX) 20 MG tablet Take 1 tablet (20 mg) by mouth daily 90 tablet 3     hydrALAZINE (APRESOLINE) 10 MG tablet Take 1 tablet (10 mg) by mouth 2 times daily 180 tablet 3     hydrocortisone (CORTAID) 1 % external cream " Apply topically 2 times daily 30 g 0     ketoconazole (NIZORAL) 2 % external shampoo Apply topically daily 120 mL 1     lisinopril (PRINIVIL/ZESTRIL) 40 MG tablet Take 1 tablet (40 mg) by mouth daily 90 tablet 3     magnesium citrate solution Magnesium citrate 10 oz as directed. NO red liquids. 296 mL 0     metoprolol tartrate (LOPRESSOR) 100 MG tablet Take 0.5 tablets (50 mg) by mouth 2 times daily 90 tablet 3     Multiple Vitamin (TAB-A-MARZENA) TABS Take 1 tablet by mouth daily 90 tablet 3     naproxen (NAPROSYN) 375 MG tablet Take 1 tablet (375 mg) by mouth 2 times daily (with meals) 30 tablet 2     nitroGLYcerin (NITROLINGUAL) 0.4 MG/SPRAY spray For chest pain spray 1 spray under tongue every 5 minutes for 3 doses. If symptoms persist 5 minutes after 1st dose call 911. 4.9 g 3     polyethylene glycol (MIRALAX) packet Miralax powder 8.3 oz bottle (238 gm) as directed 238 g 0     polyvinyl alcohol (LIQUIFILM TEARS) 1.4 % ophthalmic solution Place 1 drop into the right eye as needed for dry eyes 15 mL 3     ranitidine (ZANTAC) 150 MG tablet Take 1 tablet (150 mg) by mouth daily 90 tablet 3     rosuvastatin (CRESTOR) 20 MG tablet Take 1 tablet (20 mg) by mouth daily 90 tablet 3     sertraline (ZOLOFT) 50 MG tablet Take 1 tablet (50 mg) by mouth daily 90 tablet 3     Skin Protectants, Misc. (HYDROCERIN) CREA Apply topically 3 times daily as needed for dry skin 228 g 3     terazosin (HYTRIN) 2 MG capsule Take 1 capsule (2 mg) by mouth At Bedtime 30 capsule 11     terbinafine (LAMISIL) 1 % external cream Apply topically 2 times daily 24 g 1     traZODone (DESYREL) 50 MG tablet Take 1/2 to 2 tablets by mouth at bedtime as needed for sleep. 180 tablet 3     triamcinolone (KENALOG) 0.1 % external cream Apply topically 2 times daily 45 g 1     umeclidinium (INCRUSE ELLIPTA) 62.5 MCG/INH inhaler Inhale 1 puff into the lungs daily 3 Inhaler 3     vitamin D3 (CHOLECALCIFEROL) 2000 units tablet Take 1 tablet by mouth daily 100  "tablet 3     Allergies   Allergen Reactions     Gabapentin      Other reaction(s): Abnormal Behavior  Per pain clinic - fell, also angry/mood changes. Bad enough that family called police.  Other reaction(s): Abnormal Behavior  Per pain clinic - fell, also angry/mood changes. Bad enough that family called police.     Nitroglycerin Other (See Comments)     Pills only - can use spray.  Other reaction(s): Headache  Headache only with the pills, not the spray  Other reaction(s): Headache  uses spray instead  Other reaction(s): Headache  uses spray instead  Annotation: Headache for 2 weeks    Headache only with the pills, not the spray  uses spray instead  Pills only - can use spray.  Pills only - can use spray.  Other reaction(s): Headache  Headache only with the pills, not the spray  Other reaction(s): Headache  uses spray instead    Other reaction(s): Headache  Annotation: Headache for 2 weeks    Headache only with the pills, not the spray  uses spray instead  Pills only - can use spray.  Pills only - can use spray.  Other reaction(s): Headache  Headache only with the pills, not the spray  Other reaction(s): Headache  uses spray instead                   HPI   Patients name: Srikanth  Appointment start time:  9:21am    Follow up from Office visit one month ago. Reports LEFT shin area better. Took and finished Cephalexin. Tolerated. Redness and swelling gone. \"95 percent better\". \"shin bone still sore, 2 small little lumps, ?vein\"  Used triamcinolone for venous stasis of shin.    Still difficulties with bottom of foot, \"open area on toe. Using cream here; thought the cream was for athlete's foot but actually was also using Triamcinolone here.        Assessment and Plan   1. Left foot pain  - cyclobenzaprine (FLEXERIL) 10 MG tablet; Take 1 tablet (10 mg) by mouth 3 times daily as needed for muscle spasms or other (Foot Nerve Pain)  Dispense: 30 tablet; Refill: 3    2. Tinea pedis of both feet  - terbinafine (LAMISIL) 1 % " "external cream; Apply topically 2 times daily as needed Foot fungus  Dispense: 30 g; Refill: 1    Refilled medications that would be required in the next 3 months.     Instructions  1) STOP using Triamcinaone on bottom of foot and toes.  Use TERBINAFINE here.    2) Continue \"muscle relaxant\" flexeril at bedtime when needed to help with sleep    3) Telephone visit (Sriram ambrose) in two weeks;  Thursday April 16th 8:40am.  20 minute.  F/U ON INFECTION [phone]     After Visit Information:  DAMASO declined    Appointment end time: 9:35am  This is a telephone visit that took 14 minutes.  [id number]    Clinician location:  [facility]      "

## 2020-04-02 NOTE — PATIENT INSTRUCTIONS
"1) STOP using Triamcinaone on bottom of foot and toes.  Use TERBINAFINE here.    2) Continue \"muscle relaxant\" flexeril at bedtime when needed to help with sleep    3) Telephone visit (Sriram ambrose) in two weeks;  Thursday April 16th 8:40am.  20 minute.  F/U ON INFECTION [phone]   "

## 2020-04-03 ENCOUNTER — TELEPHONE (OUTPATIENT)
Dept: FAMILY MEDICINE | Facility: CLINIC | Age: 75
End: 2020-04-03

## 2020-04-03 DIAGNOSIS — M79.672 LEFT FOOT PAIN: ICD-10-CM

## 2020-04-03 RX ORDER — CYCLOBENZAPRINE HCL 10 MG
10 TABLET ORAL 3 TIMES DAILY PRN
Qty: 30 TABLET | Refills: 3 | Status: SHIPPED | OUTPATIENT
Start: 2020-04-03 | End: 2020-04-16

## 2020-04-03 NOTE — TELEPHONE ENCOUNTER
[facility] phone call message- medication clarification/question:    Full Medication Name:     cyclobenzaprine (FLEXERIL) 10 MG tablet   Take 1 tablet (10 mg) by mouth 3 times daily as needed for muscle spasms or other (Foot Nerve Pain) - Oral     Question:     Pt needs rx to go to [facility]. [facility] is closed.     Pharmacy confirmed as     [facility]       OK to leave a message on voice mail? Yes    Primary language: English      needed? No    Call taken on April 3, 2020 at 11:23 AM by Britta Rose CMA

## 2020-04-10 ENCOUNTER — TELEPHONE (OUTPATIENT)
Dept: FAMILY MEDICINE | Facility: CLINIC | Age: 75
End: 2020-04-10

## 2020-04-10 DIAGNOSIS — I10 ESSENTIAL HYPERTENSION: ICD-10-CM

## 2020-04-10 RX ORDER — METOPROLOL TARTRATE 100 MG
50 TABLET ORAL 2 TIMES DAILY
Qty: 90 TABLET | Refills: 3 | Status: SHIPPED | OUTPATIENT
Start: 2020-04-10 | End: 2020-06-05

## 2020-04-10 RX ORDER — LISINOPRIL 40 MG/1
40 TABLET ORAL DAILY
Qty: 90 TABLET | Refills: 3 | Status: SHIPPED | OUTPATIENT
Start: 2020-04-10 | End: 2020-06-05

## 2020-04-10 NOTE — TELEPHONE ENCOUNTER
"Per [facility] ([address] [place] [address]) pharmacy, \"The patient's plan does not cover Cyclobenzprine 10mg tablets drug without a prior authorization.  Please contact the plan at [phone] to initiate a prior authorization or call/fax the pharmacy to change medication.  The patient's Recipient Id is [id number].\"   "

## 2020-04-15 NOTE — TELEPHONE ENCOUNTER
Hi Dr. Coelho,     I believe with the new process for PA request would be:    1. If provider chooses to send an alternative.   2. If provider chooses to do a PA. PCS with start an Prior Authorization encounter listing Previously Tried and Failed medications and send to the Prior Auth department to initiate the PA for the medication.     Please advise what you would like to do.     Thanks,     JAY JAY Lucas

## 2020-04-15 NOTE — TELEPHONE ENCOUNTER
"Copying for documentation purposes:  I called the number listed.   After about 10 minutes, I determined that this number can only be used by Pharmacies; not providers.     I check with \"[employer]\"; no \"Requests\" for this matter has been created.     Please contact Srikanth Graves and ask them to forward a PA request to [email or web address] or let Dr. Coelho know a better phone number for me to call.     Thanks,   John Coelho MD    Routing comment        "

## 2020-04-15 NOTE — PROGRESS NOTES
Telephone call to the client's home for annual health risk assessment due to COVID-19.  An  was not needed.  This was a conference call that included Carlos's CADI CM to.    Current situation/living environment  Carlos currently lives in his same one bedroom apartment.  He is scheduled to move to a new housing location at the end of April.  His CADI worker is helping with the transition plans.  He has a homemaker that is also helping with packing.  Carlos has his own car and able to get around the community.  Carlos manages his own finances and MA paperwork.      Activities of daily living (ADL)/instrumental activities of daily living (IADL) and functional issues  Client needs help with the following ADL's: dressing, grooming and bathing  Client needs help with the following IADL's: shopping, cooking, housekeeping and laundry  Client states he is unable to perform the above due to chronic pain.    Carlos had a vision exam in January and saw a retina specialist.  He is wanting to have a dental exam this next ear.  Carlos reports he has hearing aids but does not wear them.     Health concerns for today  Carlos reports his main concerns are his rash on his leg.  He was provided a ABX and it is doing better. Up to date on immunizations.   He had a ED visit on the 4th of March for rash on leg pain.  He reports taking medications with no concerns.        Has patient fallen 2 or more times in the last year? No  Has patient fallen with injury in the last year? No    Cognition/mental health  Carlos reports no issues with memory.  He reports his mental health is good at this time.  He is excited to be moving.  It is a brand new building.  His PCA/homemaker made him aware of the opening.      STARS/Med Adherence  Client is non-compliant with the following quality measures: none      Client's Plan of Care consists of:  Carlos will continue to get PCA and Homemaking services.

## 2020-04-16 ENCOUNTER — VIRTUAL VISIT (OUTPATIENT)
Dept: FAMILY MEDICINE | Facility: CLINIC | Age: 75
End: 2020-04-16
Payer: MEDICARE

## 2020-04-16 VITALS — WEIGHT: 178 LBS | BODY MASS INDEX: 34.76 KG/M2

## 2020-04-16 DIAGNOSIS — R12 HEARTBURN: ICD-10-CM

## 2020-04-16 DIAGNOSIS — I87.2 VENOUS STASIS DERMATITIS OF LEFT LOWER EXTREMITY: ICD-10-CM

## 2020-04-16 DIAGNOSIS — F11.90 CHRONIC, CONTINUOUS USE OF OPIOIDS: ICD-10-CM

## 2020-04-16 DIAGNOSIS — B35.3 TINEA PEDIS OF BOTH FEET: Primary | ICD-10-CM

## 2020-04-16 DIAGNOSIS — M79.672 LEFT FOOT PAIN: ICD-10-CM

## 2020-04-16 DIAGNOSIS — I25.118 CORONARY ARTERY DISEASE OF NATIVE ARTERY OF NATIVE HEART WITH STABLE ANGINA PECTORIS (H): ICD-10-CM

## 2020-04-16 DIAGNOSIS — N18.30 CKD (CHRONIC KIDNEY DISEASE) STAGE 3, GFR 30-59 ML/MIN (H): ICD-10-CM

## 2020-04-16 DIAGNOSIS — R73.9 HYPERGLYCEMIA: ICD-10-CM

## 2020-04-16 DIAGNOSIS — N18.30 CKD (CHRONIC KIDNEY DISEASE) STAGE 3, GFR 30-59 ML/MIN (H): Primary | ICD-10-CM

## 2020-04-16 DIAGNOSIS — I10 ESSENTIAL HYPERTENSION: ICD-10-CM

## 2020-04-16 DIAGNOSIS — I73.9 PERIPHERAL VASCULAR DISEASE (H): ICD-10-CM

## 2020-04-16 DIAGNOSIS — E87.6 HYPOKALEMIA: ICD-10-CM

## 2020-04-16 RX ORDER — FAMOTIDINE 40 MG/1
40 TABLET, FILM COATED ORAL 2 TIMES DAILY PRN
Qty: 60 TABLET | Refills: 1 | Status: SHIPPED | OUTPATIENT
Start: 2020-04-16 | End: 2020-06-19

## 2020-04-16 RX ORDER — TERBINAFINE HYDROCHLORIDE 250 MG/1
250 TABLET ORAL DAILY
Qty: 30 TABLET | Refills: 1 | Status: SHIPPED | OUTPATIENT
Start: 2020-04-16 | End: 2020-07-24

## 2020-04-16 RX ORDER — CYCLOBENZAPRINE HCL 10 MG
10 TABLET ORAL 3 TIMES DAILY PRN
Qty: 30 TABLET | Refills: 3 | Status: SHIPPED | OUTPATIENT
Start: 2020-04-16 | End: 2020-05-14

## 2020-04-16 NOTE — TELEPHONE ENCOUNTER
Central Prior Authorization Team   Phone: [phone]      PA Initiation    Medication: Cyclobenzaprine 10mg tablets - INITIATED  Insurance Company: [employer] - Phone [phone] Fax [phone]  Pharmacy Filling the Rx: [id number] - [place], [place] - [address] AT [facility]  Filling Pharmacy Phone: [phone]  Filling Pharmacy Fax: [phone]  Start Date: 4/16/2020

## 2020-04-16 NOTE — TELEPHONE ENCOUNTER
Central Prior Authorization Team   Phone: [phone]      Prior Authorization Approval    Authorization Effective Date: 4/9/2020  Authorization Expiration Date: 4/16/2025  Medication: Cyclobenzaprine 10mg tablets - APPROVED  Approved Dose/Quantity: 30 FOR 10  Reference #:     Insurance Company: [employer] - Phone [phone] Fax [phone]  Expected CoPay:       CoPay Card Available:      Foundation Assistance Needed:    Which Pharmacy is filling the prescription (Not needed for infusion/clinic administered): [id number] - [place], [place] - [address] [place] [address] [address] AT [facility] [address]  Pharmacy Notified: Yes  Patient Notified: Yes (**Instructed pharmacy to notify patient when script is ready to /ship.**)

## 2020-04-16 NOTE — TELEPHONE ENCOUNTER
Prior Authorization Retail Medication Request    Medication/Dose: Cyclobenzaprine 10mg tablets.  Please see documentation.   ICD code (if different than what is on RX):  Left foot pain [M79.672]   Previously Tried and Failed:  Tizanidine  Rationale:      Insurance Name:  Medicare  Insurance ID:  [id number]       Pharmacy Information (if different than what is on RX)  Name:  [facility]  Phone:  [phone]    Note from provider: Pt did not tolerate tizanidine previously received narcotics for this pain on 10/19 and 01/20. Now stable on Flexeril.     JAY JAY Lucas

## 2020-04-16 NOTE — PATIENT INSTRUCTIONS
"17 minute phone visit.  Reviewed current sate of feet. \"Black\" ulcer on toe smaller: \"rupa\" size.  Shins with minimal swelling. Skin not red/ Continues to apply terbinafine cream. Flakey/itch between toes. Would like to try one month terbinafine tablets to give \"boost\".    Using one inhaler, Once a day; unable to identify by name. Will bring with to next appointment; will contact pharmacy with inhaler's name if supply needed prior to visit. Breathing \"good\". No cough/fever.    Prn use of zantac, supply from 5/19 getting low.    1) Terbinafine tablets once a day for one month; continue to apply the cream  2) generic pepcid prescription sent to your pharmacy. Use up supply of zantac, then start generic pepcid. Continue to monitor diet to limit heartburn  3) Recheck in clinic Thursday May 14th, 2020. 11am  "

## 2020-04-16 NOTE — PROGRESS NOTES
"Family Medicine Telephone Visit Note               Telephone Visit Consent   Patient was verbally read the following and verbal consent was obtained.    \"This telephone visit will be conducted via a call between you and your physician/provider. We have found that certain health care needs can be provided without the need for a physical exam.  This service lets us provide the care you need with a short phone conversation.  If a prescription is necessary we can send it directly to your pharmacy.  If lab work is needed we can place an order for that and you can then stop by our lab to have the test done at a later time.    Telephone visits are billed at different rates depending on your insurance coverage. During this emergency period, for some insurers they may be billed the same as an in-person visit.  Please reach out to your insurance provider with any questions.    If during the course of the call the physician/provider feels a telephone visit is not appropriate, you will not be charged for this service.\"    Name person giving consent:  Patient   Date verbal consent given:  4/16/2020  Time verbal consent given:  8:27 AM           Chief Complaint   Patient presents with     RECHECK     f/u infection on left leg, getting better, now have fungus on left foot     Telephone, spoken         HPI   Patients name: Srikanth  Appointment start time:  8:27am    17 minute phone visit.  Reviewed current sate of feet. \"Black\" ulcer on toe smaller: \"rupa\" size.  Shins with minimal swelling. Skin not red/ Continues to apply terbinafine cream. Flakey/itch between toes. Would like to try one month terbinafine tablets to give \"boost\".    Using one inhaler, Once a day; unable to identify by name. Will bring with to next appointment; will contact pharmacy with inhaler's name if supply needed prior to visit. Breathing \"good\". No cough/fever.    Prn use of zantac, supply from 5/19 getting low.        Current Outpatient Medications "   Medication Sig Dispense Refill     famotidine (PEPCID) 40 MG tablet Take 1 tablet (40 mg) by mouth 2 times daily as needed for heartburn 60 tablet 1     terbinafine (LAMISIL) 250 MG tablet Take 1 tablet (250 mg) by mouth daily 30 tablet 1     albuterol (PROAIR HFA/PROVENTIL HFA/VENTOLIN HFA) 108 (90 BASE) MCG/ACT Inhaler INHALE TWO PUFFS BY MOUTH FOUR TIMES A DAY AS NEEDED 3 Inhaler 3     amLODIPine (NORVASC) 10 MG tablet Take 1 tablet (10 mg) by mouth daily 90 tablet 3     aspirin (ASA) 81 MG tablet Take 1 tablet (81 mg) by mouth daily 90 tablet 3     bisacodyl (BISACODYL LAXATIVE) 5 MG EC tablet Take 2 tablets (10mg) as directed. 2 tablet 0     cyclobenzaprine (FLEXERIL) 10 MG tablet Take 1 tablet (10 mg) by mouth 3 times daily as needed for muscle spasms or other (Foot Nerve Pain) 30 tablet 3     diphenhydrAMINE (BENADRYL) 25 MG capsule Take 1-2 capsules (25-50 mg) by mouth every 6 hours as needed for itching or allergies 60 capsule 1     docusate sodium (COLACE) 100 MG tablet Take 1 tablet (100 mg) by mouth 2 times daily as needed for constipation 180 tablet 3     dorzolamide-timolol (COSOPT) 2-0.5 % ophthalmic solution Place 1 drop into the right eye twice daily per optometrist 1 Bottle 11     fluticasone-vilanterol (BREO ELLIPTA) 200-25 MCG/INH inhaler Inhale 1 puff into the lungs daily 3 Inhaler 3     furosemide (LASIX) 20 MG tablet Take 1 tablet (20 mg) by mouth daily 90 tablet 3     hydrALAZINE (APRESOLINE) 10 MG tablet Take 1 tablet (10 mg) by mouth 2 times daily 180 tablet 3     hydrocortisone (CORTAID) 1 % external cream Apply topically 2 times daily 30 g 0     lisinopril (ZESTRIL) 40 MG tablet Take 1 tablet (40 mg) by mouth daily 90 tablet 3     magnesium citrate solution Magnesium citrate 10 oz as directed. NO red liquids. 296 mL 0     metoprolol tartrate (LOPRESSOR) 100 MG tablet Take 0.5 tablets (50 mg) by mouth 2 times daily 90 tablet 3     Multiple Vitamin (TAB-A-MARZENA) TABS Take 1 tablet by mouth  daily 90 tablet 3     naproxen (NAPROSYN) 375 MG tablet Take 1 tablet (375 mg) by mouth 2 times daily (with meals) 30 tablet 2     nitroGLYcerin (NITROLINGUAL) 0.4 MG/SPRAY spray For chest pain spray 1 spray under tongue every 5 minutes for 3 doses. If symptoms persist 5 minutes after 1st dose call 911. 4.9 g 3     polyethylene glycol (MIRALAX) packet Miralax powder 8.3 oz bottle (238 gm) as directed 238 g 0     polyvinyl alcohol (LIQUIFILM TEARS) 1.4 % ophthalmic solution Place 1 drop into the right eye as needed for dry eyes 15 mL 3     ranitidine (ZANTAC) 150 MG tablet Take 1 tablet (150 mg) by mouth daily 90 tablet 3     rosuvastatin (CRESTOR) 20 MG tablet Take 1 tablet (20 mg) by mouth daily 90 tablet 3     sertraline (ZOLOFT) 50 MG tablet Take 1 tablet (50 mg) by mouth daily 90 tablet 3     Skin Protectants, Misc. (HYDROCERIN) CREA Apply topically 3 times daily as needed for dry skin 228 g 3     terazosin (HYTRIN) 2 MG capsule Take 1 capsule (2 mg) by mouth At Bedtime 30 capsule 11     terbinafine (LAMISIL) 1 % external cream Apply topically 2 times daily as needed Foot fungus 30 g 1     traZODone (DESYREL) 50 MG tablet Take 1/2 to 2 tablets by mouth at bedtime as needed for sleep. 180 tablet 3     triamcinolone (KENALOG) 0.1 % external cream Apply topically 2 times daily 45 g 1     umeclidinium (INCRUSE ELLIPTA) 62.5 MCG/INH inhaler Inhale 1 puff into the lungs daily 3 Inhaler 3     vitamin D3 (CHOLECALCIFEROL) 2000 units tablet Take 1 tablet by mouth daily 100 tablet 3     Allergies   Allergen Reactions     Gabapentin      Other reaction(s): Abnormal Behavior  Per pain clinic - fell, also angry/mood changes. Bad enough that family called police.  Other reaction(s): Abnormal Behavior  Per pain clinic - fell, also angry/mood changes. Bad enough that family called police.     Nitroglycerin Other (See Comments)     Pills only - can use spray.  Other reaction(s): Headache  Headache only with the pills, not the  spray  Other reaction(s): Headache  uses spray instead  Other reaction(s): Headache  uses spray instead  Annotation: Headache for 2 weeks    Headache only with the pills, not the spray  uses spray instead  Pills only - can use spray.  Pills only - can use spray.  Other reaction(s): Headache  Headache only with the pills, not the spray  Other reaction(s): Headache  uses spray instead    Other reaction(s): Headache  Annotation: Headache for 2 weeks    Headache only with the pills, not the spray  uses spray instead  Pills only - can use spray.  Pills only - can use spray.  Other reaction(s): Headache  Headache only with the pills, not the spray  Other reaction(s): Headache  uses spray instead              Review of Systems:     ROS COMP: Constitutional, HEENT, cardiovascular, pulmonary, gi and gu systems are negative, except as otherwise noted.         Physical Exam:     Wt 80.7 kg (178 lb)   BMI 34.76 kg/m    Estimated body mass index is 34.76 kg/m  as calculated from the following:    Height as of 12/24/19: 1.524 m (5').    Weight as of this encounter: 80.7 kg (178 lb).    Exam:  Constitutional: healthy, alert and no distress  Psychiatric: mentation appears normal and affect normal/bright    Diagnostic Test Results:  Labs reviewed in Epic        Assessment and Plan   1. Tinea pedis of both feet  Continue topical. Add oral Rx  - terbinafine (LAMISIL) 250 MG tablet; Take 1 tablet (250 mg) by mouth daily  Dispense: 30 tablet; Refill: 1    2. Essential hypertension  Stable. Reports taking Rx on most days (out of bottle). Fill history    3. Peripheral vascular disease (H)  Stable. High risk for LE Dz.    4. Venous stasis dermatitis of left lower extremity  Stable. Conservative treatmetn with elevation, salt restriction, compression and prn triamcinolone cream    5. CKD (chronic kidney disease) stage 3, GFR 30-59 ml/min (H)  Cr= 1.3 ( at Baseline) March 2020    6. 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 No NTG use recently    7. Heartburn  PRN zantac \"with certain foods\".Using up supply of zantac from 5/19  - famotidine (PEPCID) 40 MG tablet; Take 1 tablet (40 mg) by mouth 2 times daily as needed for heartburn  Dispense: 60 tablet; Refill: 1    1) Terbinafine tablets once a day for one month; continue to apply the cream  2) generic pepcid prescription sent to your pharmacy. Use up supply of zantac, then start generic pepcid. Continue to monitor diet to limit heartburn  3) Recheck in clinic Thursday May 14th, 2020. 11am      After Visit Information:  Will print and mail AVS     No follow-ups on file.    Appointment end time: 8:44  This is a telephone visit that took 17 minutes.      Clinician location:  [facility]    John Coelho MD  "

## 2020-05-04 ENCOUNTER — TELEPHONE (OUTPATIENT)
Dept: FAMILY MEDICINE | Facility: CLINIC | Age: 75
End: 2020-05-04

## 2020-05-04 DIAGNOSIS — I25.118 CORONARY ARTERY DISEASE OF NATIVE ARTERY OF NATIVE HEART WITH STABLE ANGINA PECTORIS (H): ICD-10-CM

## 2020-05-04 NOTE — TELEPHONE ENCOUNTER
[facility] Family Medicine phone call message- patient requesting a refill:    Full Medication Name: Gabapentin and aspirin.    Dose: ?    Pharmacy confirmed as   [id number] - [place], [place] - [address] AT [facility]  [address]  [place] [address]  Phone: [phone] Fax: [phone]  : Yes    Additional Comments: refill.     OK to leave a message on voice mail? Yes    Primary language: English      needed? No    Call taken on May 4, 2020 at 11:24 AM by Carla Rosa

## 2020-05-14 ENCOUNTER — OFFICE VISIT (OUTPATIENT)
Dept: FAMILY MEDICINE | Facility: CLINIC | Age: 75
End: 2020-05-14
Payer: MEDICARE

## 2020-05-14 VITALS
OXYGEN SATURATION: 98 % | BODY MASS INDEX: 33.75 KG/M2 | RESPIRATION RATE: 16 BRPM | HEART RATE: 60 BPM | DIASTOLIC BLOOD PRESSURE: 63 MMHG | SYSTOLIC BLOOD PRESSURE: 110 MMHG | TEMPERATURE: 99 F | WEIGHT: 172.8 LBS

## 2020-05-14 DIAGNOSIS — R73.9 HYPERGLYCEMIA: ICD-10-CM

## 2020-05-14 DIAGNOSIS — I25.118 CORONARY ARTERY DISEASE OF NATIVE ARTERY OF NATIVE HEART WITH STABLE ANGINA PECTORIS (H): ICD-10-CM

## 2020-05-14 DIAGNOSIS — E87.6 HYPOKALEMIA: ICD-10-CM

## 2020-05-14 DIAGNOSIS — M79.672 LEFT FOOT PAIN: ICD-10-CM

## 2020-05-14 DIAGNOSIS — R10.9 FLANK PAIN: Primary | ICD-10-CM

## 2020-05-14 LAB
BACTERIA: NORMAL
BILIRUBIN UR: NEGATIVE MG/DL
BLOOD UR: NEGATIVE MG/DL
BUN SERPL-MCNC: 23.9 MG/DL (ref 7–21)
CALCIUM SERPL-MCNC: 10.1 MG/DL (ref 8.5–10.1)
CASTS: NORMAL /LPF
CHLORIDE SERPLBLD-SCNC: 109.3 MMOL/L (ref 98–110)
CHOLEST SERPL-MCNC: 130.2 MG/DL (ref 0–200)
CHOLEST/HDLC SERPL: 3 {RATIO} (ref 0–5)
CO2 SERPL-SCNC: 30.8 MMOL/L (ref 20–32)
CREAT SERPL-MCNC: 1.4 MG/DL (ref 0.7–1.3)
CRYSTAL URINE: NORMAL /LPF
EPITHELIAL CELLS UR: NORMAL /LPF (ref 0–2)
GFR SERPL CREATININE-BSD FRML MDRD: 52.3 ML/MIN/1.7 M2
GLUCOSE SERPL-MCNC: 95.7 MG'DL (ref 70–99)
GLUCOSE URINE: NEGATIVE
HBA1C MFR BLD: 5.7 % (ref 4.1–5.7)
HDLC SERPL-MCNC: 43.9 MG/DL
KETONES UR QL: NEGATIVE MG/DL
LDLC SERPL CALC-MCNC: 66 MG/DL (ref 0–129)
LEUKOCYTE ESTERASE UR: NEGATIVE
MUCOUS URINE: NORMAL LPF
NITRITE UR QL STRIP: NEGATIVE MG/DL
PH UR STRIP: 5 [PH] (ref 4.5–8)
POTASSIUM SERPL-SCNC: 4.1 MMOL/L (ref 3.2–4.6)
PROTEIN UR: ABNORMAL MG/DL
RBC URINE: <2 /HPF
SODIUM SERPL-SCNC: 144.4 MMOL/L (ref 132–142)
SP GR UR STRIP: 1.02 (ref 1–1.03)
TRIGL SERPL-MCNC: 103.3 MG/DL (ref 0–150)
UROBILINOGEN UR STRIP-ACNC: ABNORMAL E.U./DL
VLDL CHOLESTEROL: 20.7 MG/DL (ref 7–32)
WBC URINE: <2 /HPF

## 2020-05-14 RX ORDER — CYCLOBENZAPRINE HCL 10 MG
10 TABLET ORAL
Qty: 30 TABLET | Refills: 3 | Status: SHIPPED | OUTPATIENT
Start: 2020-05-14 | End: 2021-03-30

## 2020-05-14 NOTE — LETTER
"May 18, 2020      Srikanth Graves  [address] [place] [address]        Dear ,    We are writing to inform you of your test results.  \"We saw you in clinic 5/14/2020 and evaluated Right sided flank pain you have been having. The pain can \"shoot down\" into the Right testicle area.   You were tender over the \"oblique\" muscles. You are know to have \"wear and tear\" in your back. It is possible the pain you are having is \"referred pain\" from issues in your back.     We tested your blood and urine to check for other \"internal\" causes of the pain, like your kidney.  We did not find evidence of this in the urine or blood. You did have CT of your middle and lower back in the fall of 2019. I went back and look at these images. The areas of the kidney's seen did NOT show evidence of cancer or kidney stone.     My plan is to monitor your pain over the next 6 weeks. If it is not improving, additional scans may be done at that time.     Recheck in 6 weeks.\"       Resulted Orders   Hemoglobin A1c ([facility])   Result Value Ref Range    Hemoglobin A1C 5.7 4.1 - 5.7 %   Lipid Panel ([facility])   Result Value Ref Range    Cholesterol 130.2 0.0 - 200.0 mg/dL    Cholesterol/HDL Ratio 3.0 0.0 - 5.0    HDL Cholesterol 43.9 >40.0 mg/dL    LDL Cholesterol Calculated 66 0 - 129 mg/dL    Triglycerides 103.3 0.0 - 150.0 mg/dL    VLDL Cholesterol 20.7 7.0 - 32.0 mg/dL   Basic Metabolic Panel ([facility])   Result Value Ref Range    Urea Nitrogen 23.9 (H) 7.0 - 21.0 mg/dL    Calcium 10.1 8.5 - 10.1 mg/dL    Chloride 109.3 98.0 - 110.0 mmol/L    Carbon Dioxide 30.8 20.0 - 32.0 mmol/L    Creatinine 1.4 (H) 0.7 - 1.3 mg/dL    Glucose 95.7 70.0 - 99.0 mg'dL    Potassium 4.1 3.2 - 4.6 mmol/L    Sodium 144.4 (H) 132.0 - 142.0 mmol/L    GFR Estimate 52.3 (L) >60.0 mL/min/1.7 m2    GFR Estimate If Black 63.3 >60.0 mL/min/1.7 m2   Urine Microscopic ([facility])   Result Value Ref Range    WBC Urine <2 <5 /hpf    RBC Urine <2 <5 /hpf    " Epithelial Cells UR 2-5 0 - 2 /lpf    Mucous Urine Few NONE lpf    Casts Urine Hyaline present NONE /lpf    Crystal Urine None NONE /lpf    Bacteria Wet Prep Moderate None   Urinalysis ([facility])   Result Value Ref Range    Specific Gravity Urine 1.020 1.005 - 1.030    pH Urine 5.0 4.5 - 8.0    Leukocyte Esterase UR Negative NEGATIVE    Nitrite Urine Negative NEGATIVE mg/dL    Protein UR 1+ (A) NEGATIVE mg/dL    Glucose Urine Negative NEGATIVE    Ketones Urine Negative NEGATIVE mg/dL    Urobilinogen mg/dL 0.2 E.U./dL 0.2 E.U./dL E.U./dL    Bilirubin UR Negative NEGATIVE mg/dL    Blood UR Negative NEGATIVE mg/dL       If you have any questions or concerns, please call the clinic at the number listed above.       Sincerely,        John Coelho MD

## 2020-05-14 NOTE — PROGRESS NOTES
SUBJECTIVE       Srikanth Graves is a 74 year old  male with a PMH significant for:     Patient Active Problem List   Diagnosis     Other constipation     Advance care planning     Bunion of great toe     Degeneration of cervical intervertebral disc     Chronic airway obstruction (H)     Chronic low back pain     Chronic pain disorder     Chronic obstructive pulmonary disease (H)     Coronary artery disease of native artery of native heart with stable angina pectoris (H)     Depression     DJD (degenerative joint disease), ankle and foot     Dyspnea on exertion     Edema     Esophageal reflux     Essential hypertension     Headache disorder     Peripheral vascular disease (H)     Primary open angle glaucoma of right eye, mild stage     Vitamin D deficiency     Cocaine abuse in remission (H)     Coccydynia     Clavus     Eye globe prosthesis     Hallux valgus, acquired     Neck pain     Routine adult health maintenance     Controlled substance agreement terminated     Testicular cyst     Intermittent chest pain     Mild episode of recurrent major depressive disorder (H)     Notalgia paresthetica     NS (nuclear sclerosis), right     Opioid type dependence (H)     Other syndromes affecting cervical region     Personal history of nicotine dependence     Sacroiliitis, not elsewhere classified (H)     Serum creatinine raised     Tobacco use disorder     Tubular adenoma of colon     CKD (chronic kidney disease) stage 3, GFR 30-59 ml/min (H)     Seen for labs and in person exam.  Venous stasis. Shins have healed. No defects/erythema. Scattered patches of hyperpigmentation. Minimal edema.  Skin on feet, Toe webs improved. Finishing oral terbinafine.    Wondering about need for MVI. [place] on TV not routinely needs so stopped his one month ago.    Awakens with ongoing bilateral posterior calf pains. Gets better with activity. Gone by noon. Comes back the next day. Has had circulation (DEMETRA normal) and bloot clot (US  "negative) tests. Pattern unchanged.    Ongoing constant, low grade (\"lets you know it's there\") pulling pain that starts in Right oblique and radiates into Right testicle. Can go into buttock. PCA, family \"wondering about kidney\" issues causing. Has CT lumbar spine last fall. Has degenerative issues there. No urinary changes. No weight loss. Had colonoscopy in fall (plans to return in 1 year for repeat colonoscopy to follow up polyps).  MSK seems most likely  When asked what the pill looked like that worked best, Had been given \"brown round pill in past few months from [place]. That helped. Could get some rest. Further review show the effective Rx was flexeril    PMH, Medications and Allergies were reviewed and updated as needed.        REVIEW OF SYSTEMS     General: No fevers, chills, sweats, unexplained weight loss  Head: No headache  Neck: No swallowing problems   CV: No chest pain or palpitations  Resp: No shortness of breath.  No cough. No hemoptysis.  GI: No constipation, diarrhea, or blood in stool.  no nausea or vomiting  : No pain passing urine or urinary frequency            OBJECTIVE     Vitals:    05/14/20 1116   BP: 110/63   BP Location: Right arm   Pulse: 60   Resp: 16   Temp: 99  F (37.2  C)   TempSrc: Oral   SpO2: 98%   Weight: 78.4 kg (172 lb 12.8 oz)     Body mass index is 33.75 kg/m .    Gen:  Looks stated age. Not acutely ill.  HEENT:Sclera non-icteric  Neck: supple without lymphadenopathy  CV:  Perfusion adequate  Pulm:  Fair air entry   ABD: soft, nontender, no masses, no rebound, BS intact throughout.  TEnder over RIGHT obliques muscles. NO Mass.  Extrem: trace edema bilaterally  \     Results for orders placed or performed in visit on 05/14/20 (from the past 24 hour(s))   Hemoglobin A1c ([facility])   Result Value Ref Range    Hemoglobin A1C 5.7 4.1 - 5.7 %   Lipid Panel ([facility])   Result Value Ref Range    Cholesterol 130.2 0.0 - 200.0 mg/dL    Cholesterol/HDL Ratio 3.0 0.0 - 5.0    HDL " Cholesterol 43.9 >40.0 mg/dL    LDL Cholesterol Calculated 66 0 - 129 mg/dL    Triglycerides 103.3 0.0 - 150.0 mg/dL    VLDL Cholesterol 20.7 7.0 - 32.0 mg/dL   Basic Metabolic Panel ([facility])   Result Value Ref Range    Urea Nitrogen 23.9 (H) 7.0 - 21.0 mg/dL    Calcium 10.1 8.5 - 10.1 mg/dL    Chloride 109.3 98.0 - 110.0 mmol/L    Carbon Dioxide 30.8 20.0 - 32.0 mmol/L    Creatinine 1.4 (H) 0.7 - 1.3 mg/dL    Glucose 95.7 70.0 - 99.0 mg'dL    Potassium 4.1 3.2 - 4.6 mmol/L    Sodium 144.4 (H) 132.0 - 142.0 mmol/L    GFR Estimate 52.3 (L) >60.0 mL/min/1.7 m2    GFR Estimate If Black 63.3 >60.0 mL/min/1.7 m2   Urine Microscopic ([facility])   Result Value Ref Range    WBC Urine <2 <5 /hpf    RBC Urine <2 <5 /hpf    Epithelial Cells UR 2-5 0 - 2 /lpf    Mucous Urine Few NONE lpf    Casts Urine Hyaline present NONE /lpf    Crystal Urine None NONE /lpf    Bacteria Wet Prep Moderate None   Urinalysis ([facility])   Result Value Ref Range    Specific Gravity Urine 1.020 1.005 - 1.030    pH Urine 5.0 4.5 - 8.0    Leukocyte Esterase UR Negative NEGATIVE    Nitrite Urine Negative NEGATIVE mg/dL    Protein UR 1+ (A) NEGATIVE mg/dL    Glucose Urine Negative NEGATIVE    Ketones Urine Negative NEGATIVE mg/dL    Urobilinogen mg/dL 0.2 E.U./dL 0.2 E.U./dL E.U./dL    Bilirubin UR Negative NEGATIVE mg/dL    Blood UR Negative NEGATIVE mg/dL           ASSESSMENT AND PLAN     Srikanth was seen today for recheck and refill request.    Diagnoses and all orders for this visit:    Flank pain  -     Urine Microscopic ([facility])  -     Urinalysis ([facility])    Hyperglycemia  -     Hemoglobin A1c ([facility])    Coronary artery disease of native artery of native heart with stable angina pectoris (H)  -     Lipid Panel ([facility])    Hypokalemia  -     Basic Metabolic Panel ([facility])    Left foot pain  -     cyclobenzaprine (FLEXERIL) 10 MG tablet; Take 1 tablet (10 mg) by mouth nightly as needed for muscle spasms or other (Foot Nerve Pain)      Workup  limited by (COVID pandemic).  Will treat pain as MSK  CT to check Abd/pelvis if not improving      Total of 30 minutes was spent in face to face contact with patient with > 50% in counseling and coordination of care.  Options for treatment and/or follow-up care were reviewed with the patient. Srikanth Graves was engaged and actively involved in the decision making process. He verbalized understanding of the options discussed and was satisfied with the final plan.      John Coelho MD

## 2020-05-14 NOTE — PATIENT INSTRUCTIONS
"Seen for labs and in person exam.  Venous stasis. Shins have healed. No defects/erythema. Scattered patches of hyperpigmentation. Minimal edema.  Skin on feet, Toe webs improved. Finishing oral terbinafine.    Wondering about need for MVI. [place] on TV not routinely needs so stopped his one month ago.    Awakens with ongoing bilateral posterior calf pains. Gets better with activity. Gone by noon. Comes back the next day. Has had circulation (DEMETRA normal) and bloot clot (US negative) tests. Pattern unchanged.    Ongoing constant, low grade (\"lets you know it's there\") pulling pain that starts in Right oblique and radiates into Right testicle. Can go into buttock. PCA, family \"wondering about kidney\" issues causing. Has CT lumbar spine last fall. Has degenerative issues there. No urinary changes. No weight loss. Had colonoscopy in fall (plans to return in 1 year for repeat colonoscopy to follow up polyps).  MSK seems most likely  "

## 2020-05-26 ENCOUNTER — TELEPHONE (OUTPATIENT)
Dept: FAMILY MEDICINE | Facility: CLINIC | Age: 75
End: 2020-05-26

## 2020-05-26 DIAGNOSIS — J44.9 CHRONIC OBSTRUCTIVE PULMONARY DISEASE, UNSPECIFIED COPD TYPE (H): ICD-10-CM

## 2020-05-26 NOTE — TELEPHONE ENCOUNTER
"Pharmacy Note    Sent RF for Jacquelino Jemta for 1 year of refills per collaborative practice agreement with Dr. Coelho.  Called pharmacy as we aren't the ones prescribing a \"glaucoma eyedrop\". They have already sent a refill request to Dr. Yassine Gonzalez, the prescriber, for this.    Daja Luna, Pharm.D.    "

## 2020-05-26 NOTE — TELEPHONE ENCOUNTER
"[facility] Family Medicine phone call message- patient requesting a refill:    Full Medication Name:    \"Eyedrops for Glaucoma\"    And     fluticasone-vilanterol (BREO ELLIPTA) 200-25 MCG/INH inhaler   Inhale 1 puff into the lungs daily - Inhalation     Pharmacy confirmed as     [id number] - [place], [place] - [address] AT [facility]  [address]  [place] [address]  Phone: [phone] Fax: [phone]  : Yes    Additional Comments:     None    OK to leave a message on voice mail? Yes    Primary language: English      needed? No    Call taken on May 26, 2020 at 9:28 AM by Britta Rose CMA  "

## 2020-06-05 ENCOUNTER — VIRTUAL VISIT (OUTPATIENT)
Dept: FAMILY MEDICINE | Facility: CLINIC | Age: 75
End: 2020-06-05
Payer: MEDICARE

## 2020-06-05 VITALS
BODY MASS INDEX: 28.66 KG/M2 | WEIGHT: 172 LBS | SYSTOLIC BLOOD PRESSURE: 126 MMHG | DIASTOLIC BLOOD PRESSURE: 77 MMHG | HEIGHT: 65 IN

## 2020-06-05 DIAGNOSIS — B35.3 TINEA PEDIS OF BOTH FEET: Primary | ICD-10-CM

## 2020-06-05 DIAGNOSIS — I10 ESSENTIAL HYPERTENSION: ICD-10-CM

## 2020-06-05 DIAGNOSIS — I25.118 CORONARY ARTERY DISEASE OF NATIVE ARTERY OF NATIVE HEART WITH STABLE ANGINA PECTORIS (H): ICD-10-CM

## 2020-06-05 DIAGNOSIS — K59.09 OTHER CONSTIPATION: ICD-10-CM

## 2020-06-05 DIAGNOSIS — F32.A DEPRESSION, UNSPECIFIED DEPRESSION TYPE: ICD-10-CM

## 2020-06-05 RX ORDER — TRIAMCINOLONE ACETONIDE 1 MG/G
OINTMENT TOPICAL 2 TIMES DAILY
Qty: 30 G | Refills: 0 | Status: SHIPPED | OUTPATIENT
Start: 2020-06-05 | End: 2020-07-24

## 2020-06-05 RX ORDER — METOPROLOL TARTRATE 100 MG
50 TABLET ORAL 2 TIMES DAILY
Qty: 90 TABLET | Refills: 3 | Status: SHIPPED | OUTPATIENT
Start: 2020-06-05

## 2020-06-05 RX ORDER — AMLODIPINE BESYLATE 10 MG/1
10 TABLET ORAL DAILY
Qty: 90 TABLET | Refills: 3 | Status: SHIPPED | OUTPATIENT
Start: 2020-06-05 | End: 2021-07-13

## 2020-06-05 RX ORDER — HYDRALAZINE HYDROCHLORIDE 10 MG/1
10 TABLET, FILM COATED ORAL 2 TIMES DAILY
Qty: 180 TABLET | Refills: 3 | Status: SHIPPED | OUTPATIENT
Start: 2020-06-05 | End: 2020-07-07

## 2020-06-05 RX ORDER — NITROGLYCERIN 400 UG/1
SPRAY ORAL
Qty: 4.9 G | Refills: 3 | Status: SHIPPED | OUTPATIENT
Start: 2020-06-05 | End: 2020-07-24

## 2020-06-05 RX ORDER — ROSUVASTATIN CALCIUM 20 MG/1
20 TABLET, COATED ORAL DAILY
Qty: 90 TABLET | Refills: 3 | Status: SHIPPED | OUTPATIENT
Start: 2020-06-05

## 2020-06-05 RX ORDER — LISINOPRIL 40 MG/1
40 TABLET ORAL DAILY
Qty: 90 TABLET | Refills: 3 | Status: SHIPPED | OUTPATIENT
Start: 2020-06-05 | End: 2020-07-24

## 2020-06-05 RX ORDER — FUROSEMIDE 20 MG
20 TABLET ORAL DAILY
Qty: 90 TABLET | Refills: 3 | Status: SHIPPED | OUTPATIENT
Start: 2020-06-05

## 2020-06-05 RX ORDER — ASPIRIN 81 MG
100 TABLET, DELAYED RELEASE (ENTERIC COATED) ORAL 2 TIMES DAILY PRN
Qty: 180 TABLET | Refills: 3 | Status: SHIPPED | OUTPATIENT
Start: 2020-06-05 | End: 2020-06-09

## 2020-06-05 ASSESSMENT — MIFFLIN-ST. JEOR: SCORE: 1452.07

## 2020-06-05 NOTE — PROGRESS NOTES
"Family Medicine Telephone Visit Note           Telephone Visit Consent   Patient was verbally read the following and verbal consent was obtained.    \"Telephone visits are billed at different rates depending on your insurance coverage. During this emergency period, for some insurers they may be billed the same as an in-person visit.  Please reach out to your insurance provider with any questions.  If during the course of the call the physician/provider feels a telephone visit is not appropriate, you will not be charged for this service.\"    Name person giving consent:  Patient   Date verbal consent given:  6/5/2020  Time verbal consent given:  8:36 AM                [facility]   Patients name: Srikanth  Appointment start time:  8:28am    \"Foot fungus\" has flared, with the recent warm & humid weather. Using terbinafine tablets and cream. Now with some cracking and itch.      Ongoing issues with legs swelling. Comes and goes. Swells about every 3rd day. Has \"venous stasis\"    Ongoing leg pain. Hurts worse in the mornings, lightens up in the afternoon. Affects both legs. Pain goes down the legs when stepping.  Felt to caused by \"Spinal stenosis\";  \"wear and tear\" in the lower back          Current Outpatient Medications   Medication Sig Dispense Refill     albuterol (PROAIR HFA/PROVENTIL HFA/VENTOLIN HFA) 108 (90 BASE) MCG/ACT Inhaler INHALE TWO PUFFS BY MOUTH FOUR TIMES A DAY AS NEEDED 3 Inhaler 3     amLODIPine (NORVASC) 10 MG tablet Take 1 tablet (10 mg) by mouth daily 90 tablet 3     aspirin (ASA) 81 MG tablet Take 1 tablet (81 mg) by mouth daily 90 tablet 3     bisacodyl (BISACODYL LAXATIVE) 5 MG EC tablet Take 2 tablets (10mg) as directed. 2 tablet 0     cyclobenzaprine (FLEXERIL) 10 MG tablet Take 1 tablet (10 mg) by mouth nightly as needed for muscle spasms or other (Foot Nerve Pain) 30 tablet 3     docusate sodium (COLACE) 100 MG tablet Take 1 tablet (100 mg) by mouth 2 times daily as needed for constipation 180 tablet " 3     dorzolamide-timolol (COSOPT) 2-0.5 % ophthalmic solution Place 1 drop into the right eye twice daily per optometrist 1 Bottle 11     famotidine (PEPCID) 40 MG tablet Take 1 tablet (40 mg) by mouth 2 times daily as needed for heartburn 60 tablet 1     fluticasone-vilanterol (BREO ELLIPTA) 200-25 MCG/INH inhaler Inhale 1 puff into the lungs daily 3 Inhaler 3     furosemide (LASIX) 20 MG tablet Take 1 tablet (20 mg) by mouth daily 90 tablet 3     hydrALAZINE (APRESOLINE) 10 MG tablet Take 1 tablet (10 mg) by mouth 2 times daily 180 tablet 3     lisinopril (ZESTRIL) 40 MG tablet Take 1 tablet (40 mg) by mouth daily 90 tablet 3     metoprolol tartrate (LOPRESSOR) 100 MG tablet Take 0.5 tablets (50 mg) by mouth 2 times daily 90 tablet 3     Multiple Vitamin (TAB-A-MARZENA) TABS Take 1 tablet by mouth daily 90 tablet 3     naproxen (NAPROSYN) 375 MG tablet Take 1 tablet (375 mg) by mouth 2 times daily (with meals) 30 tablet 2     nitroGLYcerin (NITROLINGUAL) 0.4 MG/SPRAY spray For chest pain spray 1 spray under tongue every 5 minutes for 3 doses. If symptoms persist 5 minutes after 1st dose call 911. (Patient not taking: Reported on 5/14/2020) 4.9 g 3     polyethylene glycol (MIRALAX) packet Miralax powder 8.3 oz bottle (238 gm) as directed 238 g 0     polyvinyl alcohol (LIQUIFILM TEARS) 1.4 % ophthalmic solution Place 1 drop into the right eye as needed for dry eyes 15 mL 3     rosuvastatin (CRESTOR) 20 MG tablet Take 1 tablet (20 mg) by mouth daily 90 tablet 3     sertraline (ZOLOFT) 50 MG tablet Take 1 tablet (50 mg) by mouth daily 90 tablet 3     Skin Protectants, Misc. (HYDROCERIN) CREA Apply topically 3 times daily as needed for dry skin (Patient not taking: Reported on 5/14/2020) 228 g 3     terazosin (HYTRIN) 2 MG capsule Take 1 capsule (2 mg) by mouth At Bedtime 30 capsule 11     terbinafine (LAMISIL) 1 % external cream Apply topically 2 times daily as needed Foot fungus (Patient not taking: Reported on  5/14/2020) 30 g 1     terbinafine (LAMISIL) 250 MG tablet Take 1 tablet (250 mg) by mouth daily 30 tablet 1     triamcinolone (KENALOG) 0.1 % external cream Apply topically 2 times daily (Patient not taking: Reported on 5/14/2020) 45 g 1     umeclidinium (INCRUSE ELLIPTA) 62.5 MCG/INH inhaler Inhale 1 puff into the lungs daily 3 Inhaler 3     vitamin D3 (CHOLECALCIFEROL) 2000 units tablet Take 1 tablet by mouth daily 100 tablet 3     Allergies   Allergen Reactions     Gabapentin      Other reaction(s): Abnormal Behavior  Per pain clinic - fell, also angry/mood changes. Bad enough that family called police.  Other reaction(s): Abnormal Behavior  Per pain clinic - fell, also angry/mood changes. Bad enough that family called police.     Nitroglycerin Other (See Comments)     Pills only - can use spray.  Other reaction(s): Headache  Headache only with the pills, not the spray  Other reaction(s): Headache  uses spray instead  Other reaction(s): Headache  uses spray instead  Annotation: Headache for 2 weeks    Headache only with the pills, not the spray  uses spray instead  Pills only - can use spray.  Pills only - can use spray.  Other reaction(s): Headache  Headache only with the pills, not the spray  Other reaction(s): Headache  uses spray instead    Other reaction(s): Headache  Annotation: Headache for 2 weeks    Headache only with the pills, not the spray  uses spray instead  Pills only - can use spray.  Pills only - can use spray.  Other reaction(s): Headache  Headache only with the pills, not the spray  Other reaction(s): Headache  uses spray instead              Review of Systems:     Constitutional, HEENT, cardiovascular, pulmonary, gi and gu systems are negative, except as otherwise noted.         Physical Exam:     There were no vitals taken for this visit.  Estimated body mass index is 33.75 kg/m  as calculated from the following:    Height as of 12/24/19: 1.524 m (5').    Weight as of 5/14/20: 78.4 kg (172 lb  12.8 oz).    Exam:  Constitutional: healthy, alert and no distress  Psychiatric: mentation appears normal and affect normal/bright    Results from last visit:  Office Visit on 05/14/2020   Component Date Value Ref Range Status     Hemoglobin A1C 05/14/2020 5.7  4.1 - 5.7 % Final     Cholesterol 05/14/2020 130.2  0.0 - 200.0 mg/dL Final     Cholesterol/HDL Ratio 05/14/2020 3.0  0.0 - 5.0 Final     HDL Cholesterol 05/14/2020 43.9  >40.0 mg/dL Final     LDL Cholesterol Calculated 05/14/2020 66  0 - 129 mg/dL Final     Triglycerides 05/14/2020 103.3  0.0 - 150.0 mg/dL Final     VLDL Cholesterol 05/14/2020 20.7  7.0 - 32.0 mg/dL Final     Urea Nitrogen 05/14/2020 23.9* 7.0 - 21.0 mg/dL Final     Calcium 05/14/2020 10.1  8.5 - 10.1 mg/dL Final     Chloride 05/14/2020 109.3  98.0 - 110.0 mmol/L Final     Carbon Dioxide 05/14/2020 30.8  20.0 - 32.0 mmol/L Final     Creatinine 05/14/2020 1.4* 0.7 - 1.3 mg/dL Final     Glucose 05/14/2020 95.7  70.0 - 99.0 mg'dL Final     Potassium 05/14/2020 4.1  3.2 - 4.6 mmol/L Final     Sodium 05/14/2020 144.4* 132.0 - 142.0 mmol/L Final     GFR Estimate 05/14/2020 52.3* >60.0 mL/min/1.7 m2 Final     GFR Estimate If Black 05/14/2020 63.3  >60.0 mL/min/1.7 m2 Final     WBC Urine 05/14/2020 <2  <5 /hpf Final     RBC Urine 05/14/2020 <2  <5 /hpf Final     Epithelial Cells UR 05/14/2020 2-5  0 - 2 /lpf Final     Mucous Urine 05/14/2020 Few  NONE lpf Final     Casts Urine 05/14/2020 Hyaline present  NONE /lpf Final     Crystal Urine 05/14/2020 None  NONE /lpf Final     Bacteria Wet Prep 05/14/2020 Moderate  None Final     Specific Gravity Urine 05/14/2020 1.020  1.005 - 1.030 Final     pH Urine 05/14/2020 5.0  4.5 - 8.0 Final     Leukocyte Esterase UR 05/14/2020 Negative  NEGATIVE Final     Nitrite Urine 05/14/2020 Negative  NEGATIVE mg/dL Final     Protein UR 05/14/2020 1+* NEGATIVE mg/dL Final     Glucose Urine 05/14/2020 Negative  NEGATIVE Final     Ketones Urine 05/14/2020 Negative   "NEGATIVE mg/dL Final     Urobilinogen mg/dL 05/14/2020 0.2 E.U./dL  0.2 E.U./dL E.U./dL Final     Bilirubin UR 05/14/2020 Negative  NEGATIVE mg/dL Final     Blood UR 05/14/2020 Negative  NEGATIVE mg/dL Final           Assessment and Plan   1. Tinea pedis of both feet  Recent falre despite oral and topical terbinafine. \"cracking\" is painful./itchy.  Will use triamcinolone ointment here    - triamcinolone (KENALOG) 0.1 % external ointment; Apply topically 2 times daily To toe web areas until cracking heals  Dispense: 30 g; Refill: 0    2. Other constipation    - docusate sodium (COLACE) 100 MG tablet; Take 1 tablet (100 mg) by mouth 2 times daily as needed for constipation  Dispense: 180 tablet; Refill: 3    3. Coronary artery disease of native artery of native heart with stable angina pectoris (H)  No NTG use, but current spray bottle \"old\"  - rosuvastatin (CRESTOR) 20 MG tablet; Take 1 tablet (20 mg) by mouth daily  Dispense: 90 tablet; Refill: 3  - nitroGLYcerin (NITROLINGUAL) 0.4 MG/SPRAY spray; For chest pain spray 1 spray under tongue every 5 minutes for 3 doses. If symptoms persist 5 minutes after 1st dose call 911.  Dispense: 4.9 g; Refill: 3    4. Essential hypertension    - furosemide (LASIX) 20 MG tablet; Take 1 tablet (20 mg) by mouth daily  Dispense: 90 tablet; Refill: 3  - hydrALAZINE (APRESOLINE) 10 MG tablet; Take 1 tablet (10 mg) by mouth 2 times daily  Dispense: 180 tablet; Refill: 3  - amLODIPine (NORVASC) 10 MG tablet; Take 1 tablet (10 mg) by mouth daily  Dispense: 90 tablet; Refill: 3  - lisinopril (ZESTRIL) 40 MG tablet; Take 1 tablet (40 mg) by mouth daily  Dispense: 90 tablet; Refill: 3  - metoprolol tartrate (LOPRESSOR) 100 MG tablet; Take 0.5 tablets (50 mg) by mouth 2 times daily  Dispense: 90 tablet; Refill: 3    5. Depression, unspecified depression type  Stable. No SI/SP.  10/24/2019 PHQ-9 reviewed. Sx unchanged today  - sertraline (ZOLOFT) 50 MG tablet; Take 1 tablet (50 mg) by mouth " daily  Dispense: 90 tablet; Refill: 3    Refilled medications that would be required in the next 3 months.     After Visit Information:  Will print and mail AVS     No follow-ups on file.    Appointment end time: 8:50am  This is a telephone visit that took 22 minutes.      Clinician location:  [facility] Coelho MD

## 2020-06-05 NOTE — PATIENT INSTRUCTIONS
"\"Foot fungus\" has flared, with the recent warm & humid weather. Using terbinafine tablets and cream. Now with some cracking and itch.    1) Continue Terbinafine tablets and cream.  Apply triamcinolone Ointment to \"cracked areas\"    Ongoing issues with legs swelling. Comes and goes. Swells about every 3rd day. Has \"venous stasis\"    2) Continue to monitor swelling. Elevate legs more often on days it swells. Continue to limit salt in your diet.    Ongoing leg pain. Hurts worse in the mornings, lightens up in the afternoon. Affects both legs. Pain goes down the legs when stepping.  Felt to caused by \"Spinal stenosis\";  \"wear and tear\" in the lower back    3) Walk, when you can (later in day).  Abdominal muscle strengthening. Physical Therapy when it \"opens up\", after COVID    Recheck in 2 months  "

## 2020-06-09 ENCOUNTER — TELEPHONE (OUTPATIENT)
Dept: FAMILY MEDICINE | Facility: CLINIC | Age: 75
End: 2020-06-09

## 2020-06-09 DIAGNOSIS — K59.09 OTHER CONSTIPATION: ICD-10-CM

## 2020-06-09 RX ORDER — ASPIRIN 81 MG
100 TABLET, DELAYED RELEASE (ENTERIC COATED) ORAL 2 TIMES DAILY PRN
Qty: 180 TABLET | Refills: 3 | Status: SHIPPED | OUTPATIENT
Start: 2020-06-09 | End: 2020-09-11

## 2020-06-09 NOTE — TELEPHONE ENCOUNTER
[facility] Family Medicine phone call message- medication clarification/question:    Full Medication Name:     docusate sodium (COLACE) 100 MG tablet  Take 1 tablet (100 mg) by mouth 2 times daily as needed for constipation,     Question:  Pt states pharmacy does not have prescription for medication. He is in need of them.     Pharmacy confirmed as      [id number] - [place], [place] - [address]: Yes    OK to leave a message on voice mail? Yes    Primary language: English      needed? No    Call taken on June 9, 2020 at 8:49 AM by Britta Rose CMA

## 2020-06-19 ENCOUNTER — TELEPHONE (OUTPATIENT)
Dept: FAMILY MEDICINE | Facility: CLINIC | Age: 75
End: 2020-06-19

## 2020-06-19 DIAGNOSIS — R12 HEARTBURN: ICD-10-CM

## 2020-06-19 RX ORDER — FAMOTIDINE 20 MG/1
20 TABLET, FILM COATED ORAL 2 TIMES DAILY PRN
Qty: 180 TABLET | Refills: 3 | Status: SHIPPED | OUTPATIENT
Start: 2020-06-19 | End: 2020-07-24

## 2020-06-19 NOTE — TELEPHONE ENCOUNTER
[facility] Family Medicine phone call message- medication clarification/question:    Full Medication Name:    Ranitidine     Question:     Pt is needing a replacement    Pharmacy confirmed as      [id number] - [place], [place] - [address]: Yes    OK to leave a message on voice mail? Yes    Primary language: English      needed? No    Call taken on June 19, 2020 at 1:13 PM by Britta Rose CMA

## 2020-06-23 ENCOUNTER — VIRTUAL VISIT (OUTPATIENT)
Dept: FAMILY MEDICINE | Facility: CLINIC | Age: 75
End: 2020-06-23
Payer: MEDICARE

## 2020-06-23 DIAGNOSIS — M10.072 ACUTE IDIOPATHIC GOUT OF LEFT FOOT: ICD-10-CM

## 2020-06-23 DIAGNOSIS — M25.551 HIP PAIN, RIGHT: Primary | ICD-10-CM

## 2020-06-23 ASSESSMENT — PAIN SCALES - GENERAL: PAINLEVEL: WORST PAIN (10)

## 2020-06-25 ENCOUNTER — VIRTUAL VISIT (OUTPATIENT)
Dept: FAMILY MEDICINE | Facility: CLINIC | Age: 75
End: 2020-06-25
Payer: MEDICARE

## 2020-06-25 ENCOUNTER — TELEPHONE (OUTPATIENT)
Dept: FAMILY MEDICINE | Facility: CLINIC | Age: 75
End: 2020-06-25

## 2020-06-25 VITALS — WEIGHT: 180 LBS | HEIGHT: 65 IN | BODY MASS INDEX: 29.99 KG/M2

## 2020-06-25 DIAGNOSIS — G89.29 CHRONIC BILATERAL LOW BACK PAIN WITH LEFT-SIDED SCIATICA: ICD-10-CM

## 2020-06-25 DIAGNOSIS — M10.9 ACUTE GOUTY ARTHRITIS: Primary | ICD-10-CM

## 2020-06-25 DIAGNOSIS — M54.42 CHRONIC BILATERAL LOW BACK PAIN WITH LEFT-SIDED SCIATICA: ICD-10-CM

## 2020-06-25 RX ORDER — PREDNISONE 10 MG/1
TABLET ORAL
Qty: 22 TABLET | Refills: 0 | Status: SHIPPED | OUTPATIENT
Start: 2020-06-25 | End: 2020-07-24

## 2020-06-25 ASSESSMENT — MIFFLIN-ST. JEOR: SCORE: 1488.35

## 2020-06-25 NOTE — PROGRESS NOTES
"Family Medicine Telephone Visit Note           Telephone Visit Consent   Patient was verbally read the following and verbal consent was obtained.    \"Telephone visits are billed at different rates depending on your insurance coverage. During this emergency period, for some insurers they may be billed the same as an in-person visit.  Please reach out to your insurance provider with any questions.  If during the course of the call the physician/provider feels a telephone visit is not appropriate, you will not be charged for this service.\"    Name person giving consent:  Patient   Date verbal consent given:  6/25/2020  Time verbal consent given:  1:12 PM         Chief Complaint   Patient presents with     Recheck Medication     med recheck, doctor precreibed a med and cause BP and headache                 HPI   Patients name: Srikanth  Appointment start time:  1:17 PM      Concern: Hypertension    Description of the problem : Patient was seen by Dr. Pastor for sciatica and a gout flare.  He was prescribed naproxen.  Patient states for the past 2 days he has taken the naproxen night and day.  After each pill he has noted a slight headache and an increase in his blood pressure.  At one point time patient had a blood pressure of 215/100.  Patient has been taking his blood pressure for years at home.  He normally takes it 2 to 3 minutes after sitting.  He takes it a couple times in a row.  Patient notes that his blood pressure will improve over the next hour and return to normal after few hours.  Patient denies any other symptoms besides a headache.  Patient denies any recent changes to his medications.  Patient continues to take his 5 antihypertensives.  Patient denies any improvement in the gout pain with the naproxen but did has noted an improvement in the shooting pain down his leg.  Patient has had sciatica for 1 year that has been drastically worse over the last 2 months.  Patient would like a different medication if " possible to treat his symptoms.       Current Outpatient Medications   Medication Sig Dispense Refill     predniSONE (DELTASONE) 10 MG tablet Take 4 tablets (40 mg) by mouth daily for 4 days, THEN 2 tablets (20 mg) daily for 2 days, THEN 1 tablet (10 mg) daily for 2 days. 22 tablet 0     albuterol (PROAIR HFA/PROVENTIL HFA/VENTOLIN HFA) 108 (90 BASE) MCG/ACT Inhaler INHALE TWO PUFFS BY MOUTH FOUR TIMES A DAY AS NEEDED 3 Inhaler 3     amLODIPine (NORVASC) 10 MG tablet Take 1 tablet (10 mg) by mouth daily 90 tablet 3     aspirin (ASA) 81 MG tablet Take 1 tablet (81 mg) by mouth daily 90 tablet 3     bisacodyl (BISACODYL LAXATIVE) 5 MG EC tablet Take 2 tablets (10mg) as directed. 2 tablet 0     cyclobenzaprine (FLEXERIL) 10 MG tablet Take 1 tablet (10 mg) by mouth nightly as needed for muscle spasms or other (Foot Nerve Pain) 30 tablet 3     docusate sodium (COLACE) 100 MG tablet Take 1 tablet (100 mg) by mouth 2 times daily as needed for constipation 180 tablet 3     dorzolamide-timolol (COSOPT) 2-0.5 % ophthalmic solution Place 1 drop into the right eye twice daily per optometrist 1 Bottle 11     famotidine (PEPCID) 20 MG tablet Take 1 tablet (20 mg) by mouth 2 times daily as needed for heartburn 180 tablet 3     fluticasone-vilanterol (BREO ELLIPTA) 200-25 MCG/INH inhaler Inhale 1 puff into the lungs daily 3 Inhaler 3     furosemide (LASIX) 20 MG tablet Take 1 tablet (20 mg) by mouth daily 90 tablet 3     hydrALAZINE (APRESOLINE) 10 MG tablet Take 1 tablet (10 mg) by mouth 2 times daily 180 tablet 3     lisinopril (ZESTRIL) 40 MG tablet Take 1 tablet (40 mg) by mouth daily 90 tablet 3     metoprolol tartrate (LOPRESSOR) 100 MG tablet Take 0.5 tablets (50 mg) by mouth 2 times daily 90 tablet 3     Multiple Vitamin (TAB-A-MARZENA) TABS Take 1 tablet by mouth daily 90 tablet 3     nitroGLYcerin (NITROLINGUAL) 0.4 MG/SPRAY spray For chest pain spray 1 spray under tongue every 5 minutes for 3 doses. If symptoms persist 5  minutes after 1st dose call 911. 4.9 g 3     polyethylene glycol (MIRALAX) packet Miralax powder 8.3 oz bottle (238 gm) as directed 238 g 0     polyvinyl alcohol (LIQUIFILM TEARS) 1.4 % ophthalmic solution Place 1 drop into the right eye as needed for dry eyes 15 mL 3     rosuvastatin (CRESTOR) 20 MG tablet Take 1 tablet (20 mg) by mouth daily 90 tablet 3     sertraline (ZOLOFT) 50 MG tablet Take 1 tablet (50 mg) by mouth daily 90 tablet 3     Skin Protectants, Misc. (HYDROCERIN) CREA Apply topically 3 times daily as needed for dry skin (Patient not taking: Reported on 5/14/2020) 228 g 3     terazosin (HYTRIN) 2 MG capsule Take 1 capsule (2 mg) by mouth At Bedtime 30 capsule 11     triamcinolone (KENALOG) 0.1 % external ointment Apply topically 2 times daily To toe web areas until cracking heals 30 g 0     umeclidinium (INCRUSE ELLIPTA) 62.5 MCG/INH inhaler Inhale 1 puff into the lungs daily 3 Inhaler 3     vitamin D3 (CHOLECALCIFEROL) 2000 units tablet Take 1 tablet by mouth daily 100 tablet 3     Allergies   Allergen Reactions     Gabapentin      Other reaction(s): Abnormal Behavior  Per pain clinic - fell, also angry/mood changes. Bad enough that family called police.  Other reaction(s): Abnormal Behavior  Per pain clinic - fell, also angry/mood changes. Bad enough that family called police.     Nitroglycerin Other (See Comments)     Pills only - can use spray.  Other reaction(s): Headache  Headache only with the pills, not the spray  Other reaction(s): Headache  uses spray instead  Other reaction(s): Headache  uses spray instead  Annotation: Headache for 2 weeks    Headache only with the pills, not the spray  uses spray instead  Pills only - can use spray.  Pills only - can use spray.  Other reaction(s): Headache  Headache only with the pills, not the spray  Other reaction(s): Headache  uses spray instead    Other reaction(s): Headache  Annotation: Headache for 2 weeks    Headache only with the pills, not the  "spray  uses spray instead  Pills only - can use spray.  Pills only - can use spray.  Other reaction(s): Headache  Headache only with the pills, not the spray  Other reaction(s): Headache  uses spray instead              Review of Systems:     Constitutional, HEENT, cardiovascular, pulmonary, gi and gu systems are negative, except as otherwise noted.         Physical Exam:     Ht 1.651 m (5' 5\")   Wt 81.6 kg (180 lb)   BMI 29.95 kg/m    Estimated body mass index is 29.95 kg/m  as calculated from the following:    Height as of this encounter: 1.651 m (5' 5\").    Weight as of this encounter: 81.6 kg (180 lb).    Exam:  Constitutional: healthy, alert and no distress  Psychiatric: mentation appears normal and affect normal/bright        Assessment and Plan   Srikanth was seen today for recheck medication.    Diagnoses and all orders for this visit:    Acute gouty arthritis  Chronic bilateral low back pain with left-sided sciatica: Unclear why patient is having transient hypertension with the naproxen.  Patient does seem reliable in his history though.  Given his chronic kidney disease naproxen is not a great medication anyway.  Given that both the sciatica and arthritis will improve with prednisone discussed doing a short taper of prednisone with the patient.  Patient has had troubles with injectable steroids in the past but was amenable to trying orals.  Patient does not have a diabetes.  Patient should follow-up if having worsening pain or side effects.  -     predniSONE (DELTASONE) 10 MG tablet; Take 4 tablets (40 mg) by mouth daily for 4 days, THEN 2 tablets (20 mg) daily for 2 days, THEN 1 tablet (10 mg) daily for 2 days.      After Visit Information:  Patient declined AVS     No follow-ups on file.    Appointment end time: 1:30 PM  This is a telephone visit that took 13 minutes.    Clinician location:  [facility]     Chika Aldridge MD  I precepted today with Dr. Sparks.        "

## 2020-06-25 NOTE — TELEPHONE ENCOUNTER
Patient reports he experiences transient high blood pressure about 15 minutes after taking Naproxen, recently prescribed for MSK pain. He notices after he takes it his blood pressure is over 200 systolic, then a few hours later will go down to around 130 systolic. He sometimes has had a slight headache when this happens.     He reports compliance with his blood pressure medication which he takes twice daily. Of note, he is taking the Narpoxen about half an hour to an hour before taking his blood pressure medication.     Patient denies chest pain, blurred vision, dizziness, or headache at this time. Scheduled for telephone visit with Dr. Aldridge this afternoon. ./LR

## 2020-06-25 NOTE — TELEPHONE ENCOUNTER
[facility] Family Medicine phone call message- general phone call:    Reason for call: Pt is calling because medication   Prescribed is causing his bp is causing it to go up. Pts bp is 215/100, medication is naproxen (NAPROSYN) 375 MG tablet.    Return call needed: Yes    OK to leave a message on voice mail? Yes    Primary language: English      needed? No    Call taken on June 25, 2020 at 9:12 AM by Grisel Flores-Cardona

## 2020-06-25 NOTE — PROGRESS NOTES
Preceptor Attestation:   I talked to the patient on the phone. I discussed the patient with the resident. I have verified the content of the note, which accurately reflects my assessment of the patient and the plan of care.   Supervising Physician:  Darwin Sparks MD.

## 2020-06-26 ENCOUNTER — TELEPHONE (OUTPATIENT)
Dept: FAMILY MEDICINE | Facility: CLINIC | Age: 75
End: 2020-06-26

## 2020-06-26 NOTE — TELEPHONE ENCOUNTER
[facility] phone call message- general phone call:    Reason for call: He got a injection yesterday he told the doctor he is allergic to it but she insisted that he tries it for a couple of days it is causing him trouble breathing. He needs this changed    Action desired: call back.    Return call needed: Yes    OK to leave a message on voice mail? Yes    Advised patient to response may take up to 2 business days: Yes    Primary language: English      needed? No    Call taken on June 26, 2020 at 12:00 PM by Carla Rosa

## 2020-06-26 NOTE — TELEPHONE ENCOUNTER
Patient states he has taken the steroid for 2 days and has became SOB after each dose. He states he becomes SOB with activity and with talking or laughing. Patient states he will not continue the steroid. He would like an alternative pain reliever since the naproxen does not relieve his pain and he has stopped the steroid.    Note routed to Dr.Werman Briana VIERA

## 2020-06-29 ENCOUNTER — VIRTUAL VISIT (OUTPATIENT)
Dept: FAMILY MEDICINE | Facility: CLINIC | Age: 75
End: 2020-06-29
Payer: MEDICARE

## 2020-06-29 VITALS — BODY MASS INDEX: 29.12 KG/M2 | WEIGHT: 175 LBS

## 2020-06-29 DIAGNOSIS — G89.29 CHRONIC BILATERAL LOW BACK PAIN WITH LEFT-SIDED SCIATICA: Primary | ICD-10-CM

## 2020-06-29 DIAGNOSIS — M54.42 CHRONIC BILATERAL LOW BACK PAIN WITH LEFT-SIDED SCIATICA: Primary | ICD-10-CM

## 2020-06-29 RX ORDER — MELOXICAM 15 MG/1
15 TABLET ORAL DAILY
Qty: 14 TABLET | Refills: 0 | Status: SHIPPED | OUTPATIENT
Start: 2020-06-29 | End: 2020-07-24

## 2020-06-29 NOTE — PROGRESS NOTES
Preceptor Attestation:    I talked to the patient on the phone and discussed the patient with the resident. I have verified the content of the note, which accurately reflects my assessment of the patient and the plan of care.   Supervising Physician:  Yevgeniy Moore MD.

## 2020-06-29 NOTE — PROGRESS NOTES
"Family Medicine Telephone Visit Note         Telephone Visit Consent   Patient was verbally read the following and verbal consent was obtained.    \"Telephone visits are billed at different rates depending on your insurance coverage. During this emergency period, for some insurers they may be billed the same as an in-person visit.  Please reach out to your insurance provider with any questions.  If during the course of the call the physician/provider feels a telephone visit is not appropriate, you will not be charged for this service.\"    Name person giving consent:  Patient   Date verbal consent given:  6/29/2020  Time verbal consent given:  9:03 AM      Chief Complaint   Patient presents with     Musculoskeletal Problem     bilateral leg pain all the way down to the big toe, was started on steroid last week and it cause shortness of breath, only take it for 2 days and stopped, breathing was better.          HPI   Patients name: Srikanth  Appointment start time:  9:09 AM      Srikanth Graves is a pleasant 78 year who comes in for bilateral leg pain that started in December 15 2019. Steroid was  Helping however it was making him short of breath. Took two days of it and then stopped. Used naproxen in the past without relief of his symptoms. No loss of bowel or bladder control. Left foot feels numb. No saddle anesthesia.      Current Outpatient Medications   Medication Sig Dispense Refill     albuterol (PROAIR HFA/PROVENTIL HFA/VENTOLIN HFA) 108 (90 BASE) MCG/ACT Inhaler INHALE TWO PUFFS BY MOUTH FOUR TIMES A DAY AS NEEDED 3 Inhaler 3     amLODIPine (NORVASC) 10 MG tablet Take 1 tablet (10 mg) by mouth daily 90 tablet 3     aspirin (ASA) 81 MG tablet Take 1 tablet (81 mg) by mouth daily 90 tablet 3     bisacodyl (BISACODYL LAXATIVE) 5 MG EC tablet Take 2 tablets (10mg) as directed. 2 tablet 0     cyclobenzaprine (FLEXERIL) 10 MG tablet Take 1 tablet (10 mg) by mouth nightly as needed for muscle spasms or other (Foot Nerve " Pain) 30 tablet 3     docusate sodium (COLACE) 100 MG tablet Take 1 tablet (100 mg) by mouth 2 times daily as needed for constipation 180 tablet 3     dorzolamide-timolol (COSOPT) 2-0.5 % ophthalmic solution Place 1 drop into the right eye twice daily per optometrist 1 Bottle 11     famotidine (PEPCID) 20 MG tablet Take 1 tablet (20 mg) by mouth 2 times daily as needed for heartburn 180 tablet 3     fluticasone-vilanterol (BREO ELLIPTA) 200-25 MCG/INH inhaler Inhale 1 puff into the lungs daily 3 Inhaler 3     furosemide (LASIX) 20 MG tablet Take 1 tablet (20 mg) by mouth daily 90 tablet 3     hydrALAZINE (APRESOLINE) 10 MG tablet Take 1 tablet (10 mg) by mouth 2 times daily 180 tablet 3     lisinopril (ZESTRIL) 40 MG tablet Take 1 tablet (40 mg) by mouth daily 90 tablet 3     metoprolol tartrate (LOPRESSOR) 100 MG tablet Take 0.5 tablets (50 mg) by mouth 2 times daily 90 tablet 3     Multiple Vitamin (TAB-A-MARZENA) TABS Take 1 tablet by mouth daily 90 tablet 3     nitroGLYcerin (NITROLINGUAL) 0.4 MG/SPRAY spray For chest pain spray 1 spray under tongue every 5 minutes for 3 doses. If symptoms persist 5 minutes after 1st dose call 911. 4.9 g 3     polyethylene glycol (MIRALAX) packet Miralax powder 8.3 oz bottle (238 gm) as directed 238 g 0     polyvinyl alcohol (LIQUIFILM TEARS) 1.4 % ophthalmic solution Place 1 drop into the right eye as needed for dry eyes 15 mL 3     predniSONE (DELTASONE) 10 MG tablet Take 4 tablets (40 mg) by mouth daily for 4 days, THEN 2 tablets (20 mg) daily for 2 days, THEN 1 tablet (10 mg) daily for 2 days. 22 tablet 0     rosuvastatin (CRESTOR) 20 MG tablet Take 1 tablet (20 mg) by mouth daily 90 tablet 3     sertraline (ZOLOFT) 50 MG tablet Take 1 tablet (50 mg) by mouth daily 90 tablet 3     Skin Protectants, Misc. (HYDROCERIN) CREA Apply topically 3 times daily as needed for dry skin (Patient not taking: Reported on 5/14/2020) 228 g 3     terazosin (HYTRIN) 2 MG capsule Take 1 capsule (2  "mg) by mouth At Bedtime 30 capsule 11     triamcinolone (KENALOG) 0.1 % external ointment Apply topically 2 times daily To toe web areas until cracking heals 30 g 0     umeclidinium (INCRUSE ELLIPTA) 62.5 MCG/INH inhaler Inhale 1 puff into the lungs daily 3 Inhaler 3     vitamin D3 (CHOLECALCIFEROL) 2000 units tablet Take 1 tablet by mouth daily 100 tablet 3     Allergies   Allergen Reactions     Gabapentin      Other reaction(s): Abnormal Behavior  Per pain clinic - fell, also angry/mood changes. Bad enough that family called police.  Other reaction(s): Abnormal Behavior  Per pain clinic - fell, also angry/mood changes. Bad enough that family called police.     Nitroglycerin Other (See Comments)     Pills only - can use spray.  Other reaction(s): Headache  Headache only with the pills, not the spray  Other reaction(s): Headache  uses spray instead  Other reaction(s): Headache  uses spray instead  Annotation: Headache for 2 weeks    Headache only with the pills, not the spray  uses spray instead  Pills only - can use spray.  Pills only - can use spray.  Other reaction(s): Headache  Headache only with the pills, not the spray  Other reaction(s): Headache  uses spray instead    Other reaction(s): Headache  Annotation: Headache for 2 weeks    Headache only with the pills, not the spray  uses spray instead  Pills only - can use spray.  Pills only - can use spray.  Other reaction(s): Headache  Headache only with the pills, not the spray  Other reaction(s): Headache  uses spray instead              Review of Systems:     Constitutional, HEENT, cardiovascular, pulmonary, gi and gu systems are negative, except as otherwise noted.         Physical Exam:     There were no vitals taken for this visit.  Estimated body mass index is 29.95 kg/m  as calculated from the following:    Height as of 6/25/20: 1.651 m (5' 5\").    Weight as of 6/25/20: 81.6 kg (180 lb).    Exam:  Constitutional: healthy, alert and no " distress  Psychiatric: mentation appears normal and affect normal/bright        Assessment and Plan   1. Chronic bilateral low back pain with left-sided sciatica: Patient has history of both sciatic nerve pain and arthritis.  He has been seen for both in our clinic before in the past.  Prescribed naproxen however he notes he does not think it is working.  Would like to have the patient be evaluated in person this week to further differentiate which one is causing him more issues at this time.  Difficult to narrowing in on location of pain over the phone.  We will try a 2-week course of meloxicam to help with pain control.  Would be apprehensive about refilling this medication after the two weeks as he does have a history of chronic kidney disease.  - meloxicam (MOBIC) 15 MG tablet; Take 1 tablet (15 mg) by mouth daily  Dispense: 14 tablet; Refill: 0    Refilled medications that would be required in the next 3 months.     After Visit Information:  Patient declined AVS     No follow-ups on file.    Appointment end time: 9:22 AM  This is a telephone visit that took 13  minutes.      Clinician location: [facility]    Henrietta Lutz, DO  I precepted today with Dr. Oscar MD.

## 2020-06-29 NOTE — TELEPHONE ENCOUNTER
Per Dr. Lutz pt needs an in clinic visit.     Pt only wants to see Dr. Pastor or Dr. Coelho. Pt states he wants to give it time for the medication to see if it works after speaking with Dr. Lutz. He wants to wait until next week so he would need the medication today to last him until next week Tuesday with Dr. Pastor.

## 2020-07-07 ENCOUNTER — OFFICE VISIT (OUTPATIENT)
Dept: FAMILY MEDICINE | Facility: CLINIC | Age: 75
End: 2020-07-07
Payer: MEDICARE

## 2020-07-07 VITALS
BODY MASS INDEX: 29.29 KG/M2 | HEART RATE: 52 BPM | WEIGHT: 176 LBS | TEMPERATURE: 98.5 F | OXYGEN SATURATION: 96 % | RESPIRATION RATE: 22 BRPM | DIASTOLIC BLOOD PRESSURE: 77 MMHG | SYSTOLIC BLOOD PRESSURE: 151 MMHG

## 2020-07-07 DIAGNOSIS — M54.40 CHRONIC RIGHT-SIDED LOW BACK PAIN WITH SCIATICA, SCIATICA LATERALITY UNSPECIFIED: Primary | ICD-10-CM

## 2020-07-07 DIAGNOSIS — G89.29 CHRONIC RIGHT-SIDED LOW BACK PAIN WITH SCIATICA, SCIATICA LATERALITY UNSPECIFIED: Primary | ICD-10-CM

## 2020-07-07 DIAGNOSIS — K21.00 GASTROESOPHAGEAL REFLUX DISEASE WITH ESOPHAGITIS: ICD-10-CM

## 2020-07-07 DIAGNOSIS — I10 ESSENTIAL HYPERTENSION: ICD-10-CM

## 2020-07-07 RX ORDER — HYDRALAZINE HYDROCHLORIDE 10 MG/1
10 TABLET, FILM COATED ORAL 3 TIMES DAILY
Qty: 180 TABLET | Refills: 3 | Status: SHIPPED | OUTPATIENT
Start: 2020-07-07

## 2020-07-07 RX ORDER — FAMOTIDINE 20 MG/1
20 TABLET, FILM COATED ORAL 2 TIMES DAILY
Qty: 30 TABLET | Refills: 3 | Status: SHIPPED | OUTPATIENT
Start: 2020-07-07 | End: 2020-07-08

## 2020-07-07 RX ORDER — TRAMADOL HYDROCHLORIDE 50 MG/1
TABLET ORAL
Qty: 19 TABLET | Refills: 0 | Status: SHIPPED | OUTPATIENT
Start: 2020-07-07 | End: 2020-07-24

## 2020-07-07 ASSESSMENT — PATIENT HEALTH QUESTIONNAIRE - PHQ9: SUM OF ALL RESPONSES TO PHQ QUESTIONS 1-9: 4

## 2020-07-07 NOTE — PATIENT INSTRUCTIONS
Blood pressure medications; take all the same except for hydralazine  10 mg will  Be increased  twice  to theree a day starting today     Pepsidd Reflux mediations       Tramadol Pain medication prescribed     Follow up  one week for blood pressure

## 2020-07-07 NOTE — PROGRESS NOTES
S: Srikanth Graves is a 75 year old male who returns for follow up of  Chronic low back  Pain with sciatica   complains  the right side   He was given short course of meloxicam  which he state did not hepl   Does nor wan to take steroids/side effects   Patients states that main concern today is  Pian medication for low back    PMHX/PSHX/MEDS/ALLERGIES/SHX/FHX reviewed and updated in Epic.    ROS:  General: No fevers, chills  Head: No headache  Ears: No acute change in hearing.    CV: No chest pain or palpitations.  Resp: No shortness of breath.  No cough. No hemoptysis.  GI: No nausea, vomiting, constipation, diarrhea  : No urinary pains   pain/numbness right leg intermittently  O: BP (!) 151/77 (BP Location: Right arm, Patient Position: Sitting, Cuff Size: Adult Large)   Pulse 52   Temp 98.5  F (36.9  C) (Oral)   Resp 22   Wt 79.8 kg (176 lb)   SpO2 96%   BMI 29.29 kg/m     Gen:  Well nourished and in NAD    Neck: supple without lymphadenopathy  CV:  RRR  - no murmurs, rubs, or gallups,   Pulm:  CTAB, no wheezes/rales/rhonchi, good air entry   ABD: soft, nontender, no masses, no rebound, BS intact throughout  Extrem: no cyanosis, edema or clubbing  Psych: Euthymic    Able to get up from chair  walks  Unassisted  A/P chronic low back pain   declines steroids   declines refills of meloxicam   Has done PT  Declines referral   Short course  of tramadol   2 HTN: multiple meds, needs med reconciliation   will increase hydralazine from 10 mg BID to TID   home blood pressure checks   Follow-up 1 to weeks Bring all meds and blood pressure recording  3 Refill pepcid

## 2020-07-08 ENCOUNTER — TELEPHONE (OUTPATIENT)
Dept: FAMILY MEDICINE | Facility: CLINIC | Age: 75
End: 2020-07-08

## 2020-07-08 RX ORDER — FAMOTIDINE 20 MG/1
20 TABLET, FILM COATED ORAL 2 TIMES DAILY
Qty: 60 TABLET | Refills: 3 | Status: SHIPPED | OUTPATIENT
Start: 2020-07-08 | End: 2020-07-24

## 2020-07-08 NOTE — TELEPHONE ENCOUNTER
[facility] phone call message- general phone call:    Reason for call: He has some questions re the medication  prescribed him yesterday he is confused between his heart medication and the acid reflex medication.    Action desired: call back    Return call needed: Yes    OK to leave a message on voice mail? Yes    Advised patient to response may take up to 2 business days: Yes    Primary language: English      needed? No    Call taken on July 8, 2020 at 8:10 AM by Carla Rosa

## 2020-07-09 ENCOUNTER — TELEPHONE (OUTPATIENT)
Dept: FAMILY MEDICINE | Facility: CLINIC | Age: 75
End: 2020-07-09

## 2020-07-09 NOTE — TELEPHONE ENCOUNTER
Called patient back.  Sounds like he prefers to speak to someone in person.  He will stop by sometime.      Cata Garcia, Pharm.D.

## 2020-07-09 NOTE — TELEPHONE ENCOUNTER
[facility] phone call message- general phone call:    Reason for call: Pt is calling wondering if he should still be taking medication clopidogrel bisulfate 75mg.     Return call needed: Yes    OK to leave a message on voice mail? Yes    Primary language: English      needed? No    Call taken on July 9, 2020 at 2:17 PM by Grisel Flores-Cardona

## 2020-07-09 NOTE — TELEPHONE ENCOUNTER
Per Dr Galvez's note 10/24/19:   I reviewed the cardiology notes from each [facility].  He had stenting done in February 2018.  At that time he was put on dual antiplatelet therapy with ASA 81 and Plavix.  Per most recent cardiology note, he was to complete a total of 1.5 years of dual antiplatelet therapy then discontinue Plavix and be on monotherapy with aspirin thereafter.    The clopidogrel was discontinued that day.      Dr. Coelho said he would call this patient.     Cata Garcia, Pharm.D.

## 2020-07-21 ENCOUNTER — TELEPHONE (OUTPATIENT)
Dept: FAMILY MEDICINE | Facility: CLINIC | Age: 75
End: 2020-07-21

## 2020-07-21 NOTE — TELEPHONE ENCOUNTER
Should have virtual visit to determine diagnosis  And  amount of scalp to be treated.    PLease contact Patient and have him schedule.    MB

## 2020-07-21 NOTE — TELEPHONE ENCOUNTER
Refill request for Clobetasol to affected scalp, not in patient's current medication list.  Please advise.

## 2020-07-24 ENCOUNTER — OFFICE VISIT (OUTPATIENT)
Dept: PHARMACY | Facility: PHYSICIAN GROUP | Age: 75
End: 2020-07-24
Payer: COMMERCIAL

## 2020-07-24 VITALS
HEART RATE: 72 BPM | DIASTOLIC BLOOD PRESSURE: 71 MMHG | SYSTOLIC BLOOD PRESSURE: 128 MMHG | RESPIRATION RATE: 18 BRPM | WEIGHT: 174.8 LBS | TEMPERATURE: 98.5 F | OXYGEN SATURATION: 97 % | BODY MASS INDEX: 29.09 KG/M2

## 2020-07-24 DIAGNOSIS — I25.118 CORONARY ARTERY DISEASE OF NATIVE ARTERY OF NATIVE HEART WITH STABLE ANGINA PECTORIS (H): ICD-10-CM

## 2020-07-24 DIAGNOSIS — Z00.00 ROUTINE ADULT HEALTH MAINTENANCE: ICD-10-CM

## 2020-07-24 DIAGNOSIS — F33.0 MILD EPISODE OF RECURRENT MAJOR DEPRESSIVE DISORDER (H): Primary | ICD-10-CM

## 2020-07-24 DIAGNOSIS — R12 HEARTBURN: ICD-10-CM

## 2020-07-24 DIAGNOSIS — F33.0 MILD EPISODE OF RECURRENT MAJOR DEPRESSIVE DISORDER (H): ICD-10-CM

## 2020-07-24 PROCEDURE — 99207 ZZC NO CHARGE LOS: CPT | Performed by: PHARMACIST

## 2020-07-24 RX ORDER — MULTIVITAMIN WITH FOLIC ACID 400 MCG
1 TABLET ORAL DAILY
Qty: 90 TABLET | Refills: 3 | Status: SHIPPED | OUTPATIENT
Start: 2020-07-24 | End: 2021-08-16

## 2020-07-24 RX ORDER — NITROGLYCERIN 400 UG/1
SPRAY ORAL
Qty: 4.9 G | Refills: 3 | Status: SHIPPED | OUTPATIENT
Start: 2020-07-24

## 2020-07-24 RX ORDER — LOSARTAN POTASSIUM 50 MG/1
50 TABLET ORAL 2 TIMES DAILY
Qty: 30 TABLET | COMMUNITY
Start: 2020-07-24

## 2020-07-24 RX ORDER — FAMOTIDINE 20 MG/1
TABLET, FILM COATED ORAL
Qty: 200 TABLET | Refills: 3 | Status: SHIPPED | OUTPATIENT
Start: 2020-07-24 | End: 2021-01-04

## 2020-07-24 NOTE — PROGRESS NOTES
"Clinical Pharmacy Consult:                                                    Srikanth Graves is a 75 year old male seen for a clinical pharmacist consult.  He was referred to me from himself.     Reason for Consult: Confused about his medications, particularly the famotidine.      Discussion: Has both famotidine 20mg and 40mg with him.  He tells me that he was prescribed the 20mg tablets as a replacement for his ranitidine, which is for \"heartburn\" and point to his upper chest, just below the [place] apple, and the 40mg tablets were for \"acid reflux\" which he points to his lower right abdomen. When I corrected him to the actual location of his pain, he seemed to agree that is where his acid reflux pain is. Also said it worsens with certain foods including spaghetti sauce and \"rich\" foods, which he tries to avoid. He is also afraid of \"taking too much famotidine.    Additionally, patient had vitamin D on his medication list but states he hasn't received vitamin D in quite some time. Dispense report states he last received 100 tablets on 2/18/20.  Last vitamin D level was 43 in 2019, so patient requested a new lab to see if this is needed.    Otherwise medications were reconciled but not assessed for indication, effectiveness, safety or convenience.     Plan:  1. After much discussion, we settled on famotidine 20mg scheduled twice daily for his \"heartburn\" and then may take an extra 20mg for his \"acid reflux\" as needed.   2. Placed order for vitamin D level.    Cata Garcia, Pharm.D.      "

## 2020-07-27 LAB — 25(OH)D3 SERPL-MCNC: 43.6 NG/ML (ref 30–80)

## 2020-08-13 ENCOUNTER — OFFICE VISIT (OUTPATIENT)
Dept: FAMILY MEDICINE | Facility: CLINIC | Age: 75
End: 2020-08-13
Payer: MEDICARE

## 2020-08-13 VITALS
OXYGEN SATURATION: 98 % | SYSTOLIC BLOOD PRESSURE: 131 MMHG | BODY MASS INDEX: 29.22 KG/M2 | WEIGHT: 175.4 LBS | DIASTOLIC BLOOD PRESSURE: 75 MMHG | HEIGHT: 65 IN | TEMPERATURE: 97.9 F | RESPIRATION RATE: 16 BRPM | HEART RATE: 50 BPM

## 2020-08-13 DIAGNOSIS — B35.3 TINEA PEDIS OF LEFT FOOT: Primary | ICD-10-CM

## 2020-08-13 RX ORDER — DIAPER,BRIEF,INFANT-TODD,DISP
EACH MISCELLANEOUS 2 TIMES DAILY
Qty: 30 G | Refills: 3 | Status: SHIPPED | OUTPATIENT
Start: 2020-08-13 | End: 2020-08-27

## 2020-08-13 RX ORDER — PRENATAL VIT 91/IRON/FOLIC/DHA 28-975-200
COMBINATION PACKAGE (EA) ORAL 2 TIMES DAILY
Qty: 42 G | Refills: 0 | Status: CANCELLED | OUTPATIENT
Start: 2020-08-13

## 2020-08-13 ASSESSMENT — MIFFLIN-ST. JEOR: SCORE: 1467.49

## 2020-08-13 NOTE — PROGRESS NOTES
HPI       Srikanth Graves is a 75 year old  male with a history of tinea pedis who presents for follow up of his tinea pedis. He has been using terbinafine cream and clotrimazole spray at home, and the cracking, itching, and burning has improved with this regimen.  However he attends today because he continues to have some dry skin and occasional itching on his left foot and wants it to be fully gone.  He says it is been present for about 9 months.    Patient Active Problem List   Diagnosis     Other constipation     Advance care planning     Bunion of great toe     Degeneration of cervical intervertebral disc     Chronic airway obstruction (H)     Chronic low back pain     Chronic pain disorder     Chronic obstructive pulmonary disease (H)     Coronary artery disease of native artery of native heart with stable angina pectoris (H)     Depression     DJD (degenerative joint disease), ankle and foot     Dyspnea on exertion     Edema     Esophageal reflux     Essential hypertension     Headache disorder     Peripheral vascular disease (H)     Primary open angle glaucoma of right eye, mild stage     Vitamin D deficiency     Cocaine abuse in remission (H)     Coccydynia     Clavus     Eye globe prosthesis     Hallux valgus, acquired     Neck pain     Routine adult health maintenance     Controlled substance agreement terminated     Testicular cyst     Intermittent chest pain     Mild episode of recurrent major depressive disorder (H)     Notalgia paresthetica     NS (nuclear sclerosis), right     Opioid type dependence (H)     Other syndromes affecting cervical region     Personal history of nicotine dependence     Sacroiliitis, not elsewhere classified (H)     Serum creatinine raised     Tobacco use disorder     Tubular adenoma of colon     CKD (chronic kidney disease) stage 3, GFR 30-59 ml/min (H)       Current Outpatient Medications   Medication Sig Dispense Refill     albuterol (PROAIR HFA/PROVENTIL  HFA/VENTOLIN HFA) 108 (90 BASE) MCG/ACT Inhaler INHALE TWO PUFFS BY MOUTH FOUR TIMES A DAY AS NEEDED 3 Inhaler 3     amLODIPine (NORVASC) 10 MG tablet Take 1 tablet (10 mg) by mouth daily 90 tablet 3     aspirin (ASA) 81 MG tablet Take 1 tablet (81 mg) by mouth daily 90 tablet 3     cyclobenzaprine (FLEXERIL) 10 MG tablet Take 1 tablet (10 mg) by mouth nightly as needed for muscle spasms or other (Foot Nerve Pain) 30 tablet 3     docusate sodium (COLACE) 100 MG tablet Take 1 tablet (100 mg) by mouth 2 times daily as needed for constipation 180 tablet 3     dorzolamide-timolol (COSOPT) 2-0.5 % ophthalmic solution Place 1 drop into the right eye twice daily per optometrist 1 Bottle 11     famotidine (PEPCID) 20 MG tablet Take 1 tablet (20 mg) by mouth 2 times daily. May also take 1 tablet (20 mg) daily as needed (for acid reflux). 200 tablet 3     fluticasone-vilanterol (BREO ELLIPTA) 200-25 MCG/INH inhaler Inhale 1 puff into the lungs daily 3 Inhaler 3     furosemide (LASIX) 20 MG tablet Take 1 tablet (20 mg) by mouth daily 90 tablet 3     hydrALAZINE (APRESOLINE) 10 MG tablet Take 1 tablet (10 mg) by mouth 3 times daily 180 tablet 3     losartan (COZAAR) 50 MG tablet Take 1 tablet (50 mg) by mouth 2 times daily 30 tablet      metoprolol tartrate (LOPRESSOR) 100 MG tablet Take 0.5 tablets (50 mg) by mouth 2 times daily 90 tablet 3     Multiple Vitamins-Minerals (TAB-A-MARZENA) TABS Take 1 tablet by mouth daily 90 tablet 3     nitroGLYcerin (NITROLINGUAL) 0.4 MG/SPRAY spray For chest pain spray 1 spray under tongue every 5 minutes for 3 doses. If symptoms persist 5 minutes after 1st dose call 911. 4.9 g 3     polyvinyl alcohol (LIQUIFILM TEARS) 1.4 % ophthalmic solution Place 1 drop into the right eye as needed for dry eyes 15 mL 3     rosuvastatin (CRESTOR) 20 MG tablet Take 1 tablet (20 mg) by mouth daily 90 tablet 3     sertraline (ZOLOFT) 50 MG tablet Take 1 tablet (50 mg) by mouth daily 90 tablet 3     Skin  Protectants, Misc. (HYDROCERIN) CREA Apply topically 3 times daily as needed for dry skin (Patient not taking: Reported on 5/14/2020) 228 g 3     terazosin (HYTRIN) 2 MG capsule Take 1 capsule (2 mg) by mouth At Bedtime 30 capsule 11     umeclidinium (INCRUSE ELLIPTA) 62.5 MCG/INH inhaler Inhale 1 puff into the lungs daily 3 Inhaler 3          Allergies   Allergen Reactions     Gabapentin      Other reaction(s): Abnormal Behavior  Per pain clinic - fell, also angry/mood changes. Bad enough that family called police.  Other reaction(s): Abnormal Behavior  Per pain clinic - fell, also angry/mood changes. Bad enough that family called police.     Nitroglycerin Other (See Comments)     Pills only - can use spray.  Other reaction(s): Headache  Headache only with the pills, not the spray  Other reaction(s): Headache  uses spray instead  Other reaction(s): Headache  uses spray instead  Annotation: Headache for 2 weeks    Headache only with the pills, not the spray  uses spray instead  Pills only - can use spray.  Pills only - can use spray.  Other reaction(s): Headache  Headache only with the pills, not the spray  Other reaction(s): Headache  uses spray instead    Other reaction(s): Headache  Annotation: Headache for 2 weeks    Headache only with the pills, not the spray  uses spray instead  Pills only - can use spray.  Pills only - can use spray.  Other reaction(s): Headache  Headache only with the pills, not the spray  Other reaction(s): Headache  uses spray instead            Review of Systems:     Overall he is doing well, he was just seen by cardiology.  He reports that he has been told that he has poor circulation in his legs and his problem list indicates that he has stents in his lower extremity arteries.  He does mention that he has had some areas of irritation on the lateral aspect of his legs and demonstrates that he has some prominent veins.  He has not had actual calf tenderness.  He also mentions that he gets  "occasional bruising on his trunk.  He also reports that his left lower extremity occasionally seems more swollen than the right.       Physical Exam:     Vitals:    08/13/20 0944   BP: 131/75   BP Location: Right arm   Patient Position: Sitting   Cuff Size: Adult Regular   Pulse: 50   Resp: 16   Temp: 97.9  F (36.6  C)   TempSrc: Oral   SpO2: 98%   Weight: 79.6 kg (175 lb 6.4 oz)   Height: 1.651 m (5' 5\")     Body mass index is 29.19 kg/m .  His blood pressure satisfactory, he is overweight  His left foot is examined and while there is little evidence of tinea pedis between his toes there is some flaky dry skin over the metatarsal pads on the sole of his foot.  He has no tinea pedis on his right foot.  His left foot pulses appear diminished but are present.  He has no left-sided calf tenderness and has some nonspecific minor venous dilations on the lateral aspect as described above.      Assessment and Plan   Srikanth was seen today for musculoskeletal problem and recheck medication.    Diagnoses and all orders for this visit:    Tinea pedis of left foot  -     terbinafine (LAMISIL) 1 % external solution; Apply 1 mL topically 2 times daily  -     hydrocortisone (CORTAID) 1 % external ointment; Apply topically 2 times daily for 14 days      Overall I reassured him that most of his tinea pedis is resolved.  He is using 2 different antifungal preparations and I indicated to him that the terbinafine does come in a pump spray which he could use as a single agent instead of the other 2 preparations.  Also for the dry flaky skin he could use hydrocortisone.  I have asked him to notify me or his PCP in 2 weeks if his symptoms are not fully better.    There is no evidence for a DVT.  We have encouraged him to follow-up if he continues to notice spontaneous bruising.    Options for treatment and follow-up care were reviewed with the patient and/or guardian. Srikanth MELVIN Graves and/or guardian engaged in the decision making process " and verbalized understanding of the options discussed and agreed with the final plan.    Yassine Gonzalez MD

## 2020-08-17 ENCOUNTER — TELEPHONE (OUTPATIENT)
Dept: FAMILY MEDICINE | Facility: CLINIC | Age: 75
End: 2020-08-17

## 2020-08-17 NOTE — TELEPHONE ENCOUNTER
"Per pharmacy: script clarification: \"We have dispensed losartan and lisinopril at different times in the past few months recently for this Pt. He is not sure which is the current one. Can you please clarify?\"  "

## 2020-08-18 ENCOUNTER — OFFICE VISIT (OUTPATIENT)
Dept: FAMILY MEDICINE | Facility: CLINIC | Age: 75
End: 2020-08-18
Payer: MEDICARE

## 2020-08-18 VITALS
TEMPERATURE: 98.7 F | SYSTOLIC BLOOD PRESSURE: 121 MMHG | RESPIRATION RATE: 20 BRPM | DIASTOLIC BLOOD PRESSURE: 72 MMHG | HEART RATE: 52 BPM | OXYGEN SATURATION: 98 % | WEIGHT: 175.4 LBS | BODY MASS INDEX: 29.19 KG/M2

## 2020-08-18 DIAGNOSIS — R31.9 HEMATURIA, UNSPECIFIED TYPE: Primary | ICD-10-CM

## 2020-08-18 LAB
% GRANULOCYTES: 63.5 %G (ref 40–75)
ALBUMIN SERPL BCP-MCNC: 3.7 G/DL (ref 3.5–5)
ALP SERPL-CCNC: 71 U/L (ref 45–120)
ALT SERPL W/O P-5'-P-CCNC: 22 U/L (ref 0–45)
ANION GAP SERPL CALCULATED.3IONS-SCNC: 10 MMOL/L (ref 5–18)
AST SERPL-CCNC: 17 U/L (ref 0–40)
BILIRUB SERPL-MCNC: 0.5 MG/DL (ref 0–1)
BILIRUBIN UR: NEGATIVE MG/DL
BLOOD UR: ABNORMAL MG/DL
BUN SERPL-MCNC: 33 MG/DL (ref 8–28)
CALCIUM SERPL-MCNC: 9.5 MG/DL (ref 8.5–10.5)
CHLORIDE SERPL-SCNC: 109 MMOL/L (ref 98–107)
CK SERPL-CCNC: 129 U/L (ref 30–190)
CK SERPL-CCNC: 135 U/L (ref 30–190)
CO2 SERPL-SCNC: 22 MMOL/L (ref 22–31)
CREAT SERPL-MCNC: 1.44 MG/DL (ref 0.7–1.3)
GLUCOSE SERPL-MCNC: 79 MG/DL (ref 70–125)
GLUCOSE URINE: NEGATIVE
GRANULOCYTES #: 3.5 K/UL (ref 1.6–8.3)
HCT VFR BLD AUTO: 44.9 % (ref 40–53)
HEMOGLOBIN: 13.9 G/DL (ref 13.3–17.7)
KETONES UR QL: NEGATIVE MG/DL
LEUKOCYTE ESTERASE UR: ABNORMAL
LYMPHOCYTES # BLD AUTO: 1.5 K/UL (ref 0.8–5.3)
LYMPHOCYTES NFR BLD AUTO: 26.4 %L (ref 20–48)
MCH RBC QN AUTO: 29 PG (ref 26.5–35)
MCHC RBC AUTO-ENTMCNC: 31 G/DL (ref 32–36)
MCV RBC AUTO: 93.8 FL (ref 78–100)
MID #: 0.6 K/UL (ref 0–2.2)
MID %: 10.1 %M (ref 0–20)
NITRITE UR QL STRIP: NEGATIVE MG/DL
PH UR STRIP: 5.5 [PH] (ref 4.5–8)
PLATELET # BLD AUTO: 191 K/UL (ref 150–450)
POTASSIUM SERPL-SCNC: 4.4 MMOL/L (ref 3.5–5)
PROT SERPL-MCNC: 7.6 G/DL (ref 6–8)
PROTEIN UR: NEGATIVE MG/DL
PSA SERPL-MCNC: 6.6 NG/ML (ref 0–6.5)
RBC # BLD AUTO: 4.8 M/UL (ref 4.4–5.9)
SODIUM SERPL-SCNC: 141 MMOL/L (ref 136–145)
SP GR UR STRIP: 1.01 (ref 1–1.03)
UROBILINOGEN UR STRIP-ACNC: ABNORMAL E.U./DL
WBC # BLD AUTO: 5.5 K/UL (ref 4–11)

## 2020-08-18 NOTE — PATIENT INSTRUCTIONS
1. We will call you with results.  2. Referral will call to schedule US.    20   US BLADDER  & [facility] RENAL COMPLETE  & [facility]   [phone]  Fax Orders to [phone]     Order faxed to [phone], they will contact patient to schedule.     Meghan Rosario

## 2020-08-18 NOTE — PROGRESS NOTES
Preceptor Attestation:    Patient seen and evaluated in person. I discussed the patient with the resident. I have verified the content of the note, which accurately reflects my assessment of the patient and the plan of care.   Supervising Physician:  Hudson Rodriguez MD.

## 2020-08-18 NOTE — PROGRESS NOTES
"[facility] Visit    Subjective:  Srikanth Graves is a 75 year old male with a PMHx significant for COPD, chronic pain, CAD, depression, DJD, HTN, chronic headache, CKD, opioid dependence who presents for blood coming from the penis.     Patient reports developing burning with urination over the weekend, described as \"uncomfortable\". He has not been going to the bathroom more than his usual (he is on lasix.) His stream is normal. Today, he noticed he had orange urine. No gross blood from penis. No fevers or chills. He has also noticed testicular pain. He tried some Ibuprofen with mild relief. He then tried Excedrin the next day which provided more relief. No problems with erections.     He had these symptoms 20 years ago when he had chlamydia. He is not sexually active. No sores or lesions on the penis.     Nonsmoker now. History of 60 pack years. No problems in the past with urination. Never had issues with his prostate. No trauma to the area. No blood thinners. No beets the past few days.     Of note, patient seen 5/14/20 for flank pain. UA/urine micro negative for blood at that time. Recommended CT abd/pelvis if not improving.     Preventative care: NOT UTD. Needs hepatitis C screening, colorectal screening    PMH, Medications and Allergies were reviewed and updated as needed.    Objective:  Vitals:    08/18/20 1531   BP: 121/72   BP Location: Right arm   Patient Position: Sitting   Cuff Size: Adult Regular   Pulse: 52   Resp: 20   Temp: 98.7  F (37.1  C)   TempSrc: Oral   SpO2: 98%   Weight: 79.6 kg (175 lb 6.4 oz)     Body mass index is 29.19 kg/m .    GEN: NAD, audibly wheezing, alert  HEENT: NC/AT, EOMI, normal conjunctivae/sclerae, MMM  RESP: Decreased expiratory phase, faint wheezing throughout  CV: Borderline bradycardic but regular, nl S1/S2, no m/r/g, no peripheral edema  ABD: soft, NT/ND, +BS throughout, no CVA tenderness  : Uncircumcised penis, no lesions; Right testicular " tenderness to palpation, no masses appreciated in right or left testicle, no inguinal hernia, no inguinal lymphadenopathy (exam precepted by Dr. Rodriguez which patient was okay with)  MSK: no MSK defects noted, gait is age appropriate w/o ataxia  SKIN: no suspicious lesions or rashes  NEURO: no obvious focal deficits  PSYCH: mentation appears normal, affect normal/bright    No results found for any visits on 08/18/20.     Assessment/Plan:  Srikanth was seen today for pain.    Diagnoses and all orders for this visit:    Hematuria, unspecified type  Patient having burning with urination, intermittent testicular pain, as well as intermittent flank vs MSK pain. No problem with erections. No issues with stream to suggest prostate issue. Of note, he does have a long history of smoking.  exam is normal with the exception of mild right testicular tenderness. Patient was seen for similar issue in May by Dr. Coelho and had a normal UA/micro at that time. We will recheck UA today for any signs of infection or blood. Unlikely this is a kidney stone. Epidymidis, prostatitis, cystitis in the differential. We will also check CK as he is on a high dose statin, CMP/CBC looking for hemolysis and at liver function. As this issue has been going on for some time, do feel imaging is warranted today. We had an extensive conversation about the PSA test. With shared decision making, he would like to go ahead with this today.   -     Urinalysis(P )  -     Hepatitis C Antibody ([facility])  -     Comprehensive Metabolic Panel ([place])  -     CBC with Diff Plt ([facility])  -     CK total  -     PSA Screening ([facility])  -     US Testicular and Scrotum; Future  -     US Renal Complete; Future  -     US Bladder Only; Future      RTC as needed for follow up of symptoms or sooner if develops new or worsening symptoms.    Options for treatment and follow-up care were reviewed with the patient who was engaged and actively involved in the decision  making process, verbalized understanding of the options discussed, and satisfied with the final plan.    Patient was staffed with supervising physician, Dr. Rodriguez.     Meghan Liu MD, PGY2  [facility]

## 2020-08-19 LAB — HCV AB SER QL: NEGATIVE

## 2020-08-19 NOTE — TELEPHONE ENCOUNTER
"Copy Dr. Coelho note for documentation     Please contact pharmacy and let them know:     \"Srikanth Star has been changed to losartan.   Please cancel and further refills on the lisinopril\"     John Coelho MD    Message text        "

## 2020-08-19 NOTE — RESULT ENCOUNTER NOTE
Hello,    Please call the following patient with the results below. Thank you!    Ayesha Mr. Graves,    I hope you're well. I wanted to communicate with you the results of the tests that we did.     The laboratory results show no infection in the urine, but there is some blood. Other lab results were normal. Kidney function is approximately at your baseline. I will wait until you have your ultrasounds and call you with those results. Please let me know if you have any other questions or concerns.     Thank you!  Meghan Liu MD PGY2

## 2020-08-20 ENCOUNTER — RECORDS - HEALTHEAST (OUTPATIENT)
Dept: ADMINISTRATIVE | Facility: OTHER | Age: 75
End: 2020-08-20

## 2020-08-24 ENCOUNTER — HOSPITAL ENCOUNTER (OUTPATIENT)
Dept: ULTRASOUND IMAGING | Facility: CLINIC | Age: 75
Discharge: HOME | End: 2020-08-24

## 2020-08-24 ENCOUNTER — OFFICE VISIT (OUTPATIENT)
Dept: FAMILY MEDICINE | Facility: CLINIC | Age: 75
End: 2020-08-24
Payer: MEDICARE

## 2020-08-24 VITALS
TEMPERATURE: 98.7 F | HEIGHT: 65 IN | BODY MASS INDEX: 29.32 KG/M2 | HEART RATE: 53 BPM | WEIGHT: 176 LBS | DIASTOLIC BLOOD PRESSURE: 69 MMHG | SYSTOLIC BLOOD PRESSURE: 116 MMHG | RESPIRATION RATE: 20 BRPM | OXYGEN SATURATION: 96 %

## 2020-08-24 DIAGNOSIS — N50.819 TESTICULAR PAIN: Primary | ICD-10-CM

## 2020-08-24 DIAGNOSIS — R31.9 HEMATURIA, UNSPECIFIED TYPE: ICD-10-CM

## 2020-08-24 DIAGNOSIS — R30.0 DYSURIA: ICD-10-CM

## 2020-08-24 LAB
BACTERIA: NORMAL
BILIRUBIN UR: NEGATIVE MG/DL
BLOOD UR: ABNORMAL MG/DL
CASTS: NORMAL /LPF
CRYSTAL URINE: NORMAL /LPF
EPITHELIAL CELLS UR: NORMAL /LPF (ref 0–2)
GLUCOSE URINE: NEGATIVE
KETONES UR QL: NEGATIVE MG/DL
LEUKOCYTE ESTERASE UR: ABNORMAL
MUCOUS URINE: NORMAL LPF
NITRITE UR QL STRIP: NEGATIVE MG/DL
PH UR STRIP: 5.5 [PH] (ref 4.5–8)
PROTEIN UR: ABNORMAL MG/DL
RBC URINE: NORMAL /HPF
SP GR UR STRIP: 1.02 (ref 1–1.03)
UROBILINOGEN UR STRIP-ACNC: ABNORMAL E.U./DL
WBC URINE: NORMAL /HPF

## 2020-08-24 RX ORDER — IBUPROFEN 200 MG
200-400 TABLET ORAL EVERY 6 HOURS PRN
Qty: 90 TABLET | Refills: 0 | Status: SHIPPED | OUTPATIENT
Start: 2020-08-24 | End: 2020-09-11

## 2020-08-24 RX ORDER — LEVOFLOXACIN 500 MG/1
500 TABLET, FILM COATED ORAL DAILY
Qty: 14 TABLET | Refills: 0 | Status: SHIPPED | OUTPATIENT
Start: 2020-08-24 | End: 2020-09-07

## 2020-08-24 ASSESSMENT — MIFFLIN-ST. JEOR: SCORE: 1470.21

## 2020-08-24 NOTE — PROGRESS NOTES
[facility] Visit    Subjective:  Srikanth Graves is a 75 year old male with a PMHx significant for   Patient Active Problem List   Diagnosis     Other constipation     Advance care planning     Bunion of great toe     Degeneration of cervical intervertebral disc     Chronic airway obstruction (H)     Chronic low back pain     Chronic pain disorder     Chronic obstructive pulmonary disease (H)     Coronary artery disease of native artery of native heart with stable angina pectoris (H)     Depression     DJD (degenerative joint disease), ankle and foot     Dyspnea on exertion     Edema     Esophageal reflux     Essential hypertension     Headache disorder     Peripheral vascular disease (H)     Primary open angle glaucoma of right eye, mild stage     Vitamin D deficiency     Cocaine abuse in remission (H)     Coccydynia     Clavus     Eye globe prosthesis     Hallux valgus, acquired     Neck pain     Routine adult health maintenance     Controlled substance agreement terminated     Testicular cyst     Intermittent chest pain     Mild episode of recurrent major depressive disorder (H)     Notalgia paresthetica     NS (nuclear sclerosis), right     Opioid type dependence (H)     Other syndromes affecting cervical region     Personal history of nicotine dependence     Sacroiliitis, not elsewhere classified (H)     Serum creatinine raised     Tobacco use disorder     Tubular adenoma of colon     CKD (chronic kidney disease) stage 3, GFR 30-59 ml/min (H)    who presents for follow-up of penile, testicular pain as well as dysuria and hematuria.  He was seen about a week ago at that time had relatively normal work-up with normal bilateral kidney ultrasound.  He comes in today with continued symptoms immediately after his kidney ultrasound.  He says that the burning he feels during urination feels very similar to when he had gonorrhea or chlamydia as a younger man.  Says that there is absolutely no way he  "has gonorrhea chlamydia now, but he is amenable to be tested.  The penile and testicular pain is somewhat difficult for him to describe.  He can point to particular areas on the testicles that are painful.  There is no feeling of heaviness in the testes, no relief with elevation of the testes.  Although he has some pelvic pain, seems to be more on the penis and testicles.  He had a normal PSA at his last visit.  He has not noticed any discharge from the penis.  No fevers or chills.  No other systemic symptoms.    ROS:   A complete 12-point ROS was obtained and was negative except as stated above in HPI.    Objective:  Vitals:    08/24/20 1312   BP: 116/69   BP Location: Right arm   Patient Position: Sitting   Cuff Size: Adult Regular   Pulse: 53   Resp: 20   Temp: 98.7  F (37.1  C)   TempSrc: Oral   SpO2: 96%   Weight: 79.8 kg (176 lb)   Height: 1.651 m (5' 5\")     Body mass index is 29.29 kg/m .    GEN: NAD, healthy, alert  EYES: grossly normal to inspection, normal conjunctivae/sclerae  : Visual exam of penis and testicles without abnormality; there are no lesions, ulcerations.  No obvious visual swelling.  Testicles are equal in size and are nontender to light palpation.  Both testes have somewhat discrete areas of tenderness on palpation.  No relief with elevation.  No pain with palpation of the penile shaft.  No discharge from the urethra.  No inguinal lymphadenopathy.  BACK: No flank pain  PSYCH: mentation appears normal, affect normal/bright    No results found for this or any previous visit (from the past 24 hour(s)).    Assessment/Plan:  Srikanth was seen today for musculoskeletal problem.    Diagnoses and all orders for this visit:    Testicular pain  Dysuria  At this point diagnosis still remains unclear.  We will tested for gonorrhea and chlamydia, but this will probably come back negative.  Some of the symptoms do sound like prostatitis, but does not have classic pelvic pain.  Some symptoms sound like " orchitis/epididymitis but he is not have any swelling exquisite pain.  Will treat with a 14-day course of levofloxacin to cover for both of these; this is both a treatment and somewhat diagnostic approach.  Months also on the differential, but really no testicular swelling.  UA today with microscopic.  If in fact having hematuria will need to initiate this work-up as well.  He will follow-up with me in 2 weeks following the conclusion of his course of levofloxacin.  Ibuprofen also for pain control.  -     Urinalysis, Micro If ([facility])  -     Chlamydia/Gono Amplified ([facility])  -     levofloxacin (LEVAQUIN) 500 MG tablet; Take 1 tablet (500 mg) by mouth daily for 14 days  -     ibuprofen (ADVIL/MOTRIN) 200 MG tablet; Take 1-2 tablets (200-400 mg) by mouth every 6 hours as needed for mild pain      Options for treatment and follow-up care were reviewed with the patient who was engaged and actively involved in the decision making process, verbalized understanding of the options discussed, and satisfied with the final plan.    Patient was staffed with supervising physician, Dr. Bower.     Giovanni Galvez MD PGY3  [facility]

## 2020-08-24 NOTE — PROGRESS NOTES
Preceptor Attestation:    Patient seen and evaluated in person. I discussed the patient with the resident. I have verified the content of the note, which accurately reflects my assessment of the patient and the plan of care.   Supervising Physician:  Ishaan Bower MD.

## 2020-08-24 NOTE — LETTER
August 27, 2020      Srikanth Graves  [address]  [place] [address]        Dear ,    We are writing to inform you of your test results.    The results from the testing done at your last visit show that you do not have gonorrhea or chlamydia, as we expected. Your urine is not a slam dunk for infection, but this is a slight possibility. Continue the antibiotics as prescribed and see how things go. Make a follow up appointment if things aren't improving with the antibiotics.     Please feel free to call the clinic with any questions.   We look forward to seeing you at your next appointment.     Resulted Orders   Urinalysis, Micro If ([facility])   Result Value Ref Range    Specific Gravity Urine 1.020 1.005 - 1.030    pH Urine 5.5 4.5 - 8.0    Leukocyte Esterase UR 2+ (A) NEGATIVE    Nitrite Urine Negative NEGATIVE mg/dL    Protein UR 1+ (A) NEGATIVE mg/dL    Glucose Urine Negative NEGATIVE    Ketones Urine Negative NEGATIVE mg/dL    Urobilinogen mg/dL 0.2 E.U./dL 0.2 E.U./dL E.U./dL    Bilirubin UR Negative NEGATIVE mg/dL    Blood UR 2+ (A) NEGATIVE mg/dL   Chlamydia/Gono Amplified ([facility])   Result Value Ref Range    Chlamydia trac,Amplified Prb Negative Negative    N gonorrhoeae,Amplified Prb Negative Negative    Narrative    Test performed by:  [facility]  [address] [place] [address]   Urine Microscopic ([facility])   Result Value Ref Range    WBC Urine 5-10 <5 /hpf    RBC Urine 10-25 <5 /hpf    Epithelial Cells UR 2-5 0 - 2 /lpf    Mucous Urine None NONE lpf    Casts Urine None NONE /lpf    Crystal Urine None NONE /lpf    Bacteria Wet Prep Few None       If you have any questions or concerns, please call the clinic at the number listed above.       Sincerely,        Giovanni Galvez MD

## 2020-08-25 ENCOUNTER — RECORDS - HEALTHEAST (OUTPATIENT)
Dept: ADMINISTRATIVE | Facility: OTHER | Age: 75
End: 2020-08-25

## 2020-08-25 DIAGNOSIS — N50.811 TESTICULAR PAIN, RIGHT: Primary | ICD-10-CM

## 2020-08-25 NOTE — PROGRESS NOTES
New/Updated order     [facility]   [phone]  Fax Orders to [phone]     Order faxed to [phone], they will contact patient to schedule.     Meghan Rosario

## 2020-08-26 LAB
C TRACH RRNA SPEC QL NAA+PROBE: NEGATIVE
N GONORRHOEA RRNA SPEC QL NAA+PROBE: NEGATIVE

## 2020-08-27 ENCOUNTER — HOSPITAL ENCOUNTER (OUTPATIENT)
Dept: ULTRASOUND IMAGING | Facility: CLINIC | Age: 75
Discharge: HOME | End: 2020-08-27

## 2020-08-27 DIAGNOSIS — N50.811 TESTICULAR PAIN, RIGHT: ICD-10-CM

## 2020-08-27 DIAGNOSIS — R31.9 HEMATURIA, UNSPECIFIED TYPE: ICD-10-CM

## 2020-08-27 NOTE — RESULT ENCOUNTER NOTE
"Keshia-- Can you please send out the following letter? Thanks!!    \"Dear Mr. Graves:    The results from the testing done at your last visit show that you do not have gonorrhea or chlamydia, as we expected. Your urine is not a slam dunk for infection, but this is a slight possibility. Continue the antibiotics as prescribed and see how things go. Make a follow up appointment if things aren't improving with the antibiotics.     Please feel free to call the clinic with any questions.  We look forward to seeing you at your next appointment.    Dr. Giovanni Galvez  [facility]\""

## 2020-09-11 ENCOUNTER — OFFICE VISIT (OUTPATIENT)
Dept: FAMILY MEDICINE | Facility: CLINIC | Age: 75
End: 2020-09-11
Payer: MEDICARE

## 2020-09-11 VITALS
SYSTOLIC BLOOD PRESSURE: 139 MMHG | DIASTOLIC BLOOD PRESSURE: 74 MMHG | TEMPERATURE: 98.5 F | BODY MASS INDEX: 29.19 KG/M2 | OXYGEN SATURATION: 97 % | HEART RATE: 50 BPM | WEIGHT: 175.4 LBS | RESPIRATION RATE: 20 BRPM

## 2020-09-11 DIAGNOSIS — N50.811 TESTICULAR PAIN, RIGHT: ICD-10-CM

## 2020-09-11 DIAGNOSIS — M54.40 CHRONIC RIGHT-SIDED LOW BACK PAIN WITH SCIATICA, SCIATICA LATERALITY UNSPECIFIED: ICD-10-CM

## 2020-09-11 DIAGNOSIS — G89.29 CHRONIC RIGHT-SIDED LOW BACK PAIN WITH SCIATICA, SCIATICA LATERALITY UNSPECIFIED: ICD-10-CM

## 2020-09-11 DIAGNOSIS — K59.09 OTHER CONSTIPATION: ICD-10-CM

## 2020-09-11 DIAGNOSIS — R31.9 HEMATURIA, UNSPECIFIED TYPE: Primary | ICD-10-CM

## 2020-09-11 RX ORDER — NAPROXEN 500 MG/1
500 TABLET ORAL 2 TIMES DAILY WITH MEALS
Qty: 90 TABLET | Refills: 1 | Status: SHIPPED | OUTPATIENT
Start: 2020-09-11 | End: 2020-12-08

## 2020-09-11 RX ORDER — AMOXICILLIN 250 MG
1 CAPSULE ORAL DAILY
Qty: 60 TABLET | Refills: 3 | Status: SHIPPED | OUTPATIENT
Start: 2020-09-11

## 2020-09-11 NOTE — PROGRESS NOTES
[facility] Visit    Subjective:  Srikanth Graves is a 75 year old male with a PMHx significant for   Patient Active Problem List   Diagnosis     Other constipation     Advance care planning     Bunion of great toe     Degeneration of cervical intervertebral disc     Chronic airway obstruction (H)     Chronic low back pain     Chronic pain disorder     Chronic obstructive pulmonary disease (H)     Coronary artery disease of native artery of native heart with stable angina pectoris (H)     Depression     DJD (degenerative joint disease), ankle and foot     Dyspnea on exertion     Edema     Esophageal reflux     Essential hypertension     Headache disorder     Peripheral vascular disease (H)     Primary open angle glaucoma of right eye, mild stage     Vitamin D deficiency     Cocaine abuse in remission (H)     Coccydynia     Clavus     Eye globe prosthesis     Hallux valgus, acquired     Neck pain     Routine adult health maintenance     Controlled substance agreement terminated     Testicular cyst     Intermittent chest pain     Mild episode of recurrent major depressive disorder (H)     Notalgia paresthetica     NS (nuclear sclerosis), right     Opioid type dependence (H)     Other syndromes affecting cervical region     Personal history of nicotine dependence     Sacroiliitis, not elsewhere classified (H)     Serum creatinine raised     Tobacco use disorder     Tubular adenoma of colon     CKD (chronic kidney disease) stage 3, GFR 30-59 ml/min (H)    who presents for follow-up of testicular pain and hematuria.  This been going on for months to a year.  I saw the patient 2 weeks ago and prescribed a two-week course of ofloxacin for possibility of prostatitis. He completed this and still has bilateral testicular pain, though notes the hematuria has resolved. Review of scrotal US and kidney US does not provide obvious source of pain.      ROS:   A complete 12-point ROS was obtained and was negative  except as stated above in HPI.    Objective:  Vitals:    09/11/20 1316   BP: 139/74   Pulse: 50   Resp: 20   Temp: 98.5  F (36.9  C)   TempSrc: Oral   SpO2: 97%   Weight: 79.6 kg (175 lb 6.4 oz)     Body mass index is 29.19 kg/m .    GEN: NAD, healthy, alert  RESP: Breathing comfortably on RA  SKIN: no suspicious lesions or rashes  : Deferred repeat genital exam  PSYCH: mentation appears normal, affect normal/bright    No results found for this or any previous visit (from the past 24 hour(s)).    Assessment/Plan:  Srikanth was seen today for recheck.    Diagnoses and all orders for this visit:    Hematuria, unspecified type  Normal kidney ultrasound, but known hematuria with urine microscopy. Refer to urology for likely pyelogram and cystoscopy  -     UROLOGY ADULT REFERRAL; Future    Testicular pain, right  Unclear cause of testicular pain. Unlikely that small cyst or hydrocele are causing bilateral pain. Mumps is a possibility, but he does not have any swelling of the testes on exam.    -     UROLOGY ADULT REFERRAL; Future  -     naproxen (NAPROSYN) 500 MG tablet; Take 1 tablet (500 mg) by mouth 2 times daily (with meals)    Chronic right-sided low back pain with sciatica, sciatica laterality unspecified  Needs a new tens unit  -     Cancel: Tens Unit/Supplies Order for DME - ONLY FOR DME  -     Tens Unit/Supplies Order    Other constipation  -     senna-docusate (SENOKOT-S/PERICOLACE) 8.6-50 MG tablet; Take 1 tablet by mouth daily      Options for treatment and follow-up care were reviewed with the patient who was engaged and actively involved in the decision making process, verbalized understanding of the options discussed, and satisfied with the final plan.    Patient was staffed with supervising physician, Dr. Lee.     Giovanni Galvez MD PGY3  [facility]    DME (Durable Medical Equipment) Orders and Documentation  Orders Placed This Encounter   Procedures     Tens Unit/Supplies Order      The  patient was assessed and it was determined the patient is in need of the following listed DME Supplies/Equipment. Please complete supporting documentation below to demonstrate medical necessity.      TENS Unit/Supplies Documentation  The patient needs a TENS unit for the treatment of: Chronic Pain Other than Low Back Pain   TENS Unit for Chronic Pain Other than Low Back Pain  Has pain been present for at least three months? yes  Other appropriate treatment modalities have been tried and failed?  yes  Patient used TENS for a trial basis for a minimum of 30 days? yes  Is the patient is likely to derive significant therapeutic benefit from continuous use of the unit over a long period of time? yes

## 2020-09-11 NOTE — PROGRESS NOTES
Preceptor Attestation:    Patient seen and evaluated in person. I discussed the patient with the resident. I have verified the content of the note, which accurately reflects my assessment of the patient and the plan of care.   Supervising Physician:  William Lee MD.

## 2020-09-14 NOTE — PATIENT INSTRUCTIONS
20   UROLOGY ADULT REFERRAL   [facility]  [phone]  Fax: [phone]    Referral, office notes and labs faxed to [phone].  They will contact patient to schedule.     Meghan Rosario

## 2020-09-24 ENCOUNTER — MEDICAL CORRESPONDENCE (OUTPATIENT)
Dept: HEALTH INFORMATION MANAGEMENT | Facility: CLINIC | Age: 75
End: 2020-09-24

## 2020-09-29 DIAGNOSIS — G89.29 CHRONIC RIGHT-SIDED LOW BACK PAIN WITH SCIATICA, SCIATICA LATERALITY UNSPECIFIED: Primary | ICD-10-CM

## 2020-09-29 DIAGNOSIS — M54.40 CHRONIC RIGHT-SIDED LOW BACK PAIN WITH SCIATICA, SCIATICA LATERALITY UNSPECIFIED: Primary | ICD-10-CM

## 2020-10-01 NOTE — PROGRESS NOTES
10/01/20   PT/OT REFERRAL    [facility]  [address], [address]  [place] [address]  Phone: [phone]  Fax: [phone]    Referral and demographics faxed to [phone] who will contact patient to schedule.     Meghan Rosario

## 2020-10-09 ENCOUNTER — DOCUMENTATION ONLY (OUTPATIENT)
Dept: FAMILY MEDICINE | Facility: CLINIC | Age: 75
End: 2020-10-09

## 2020-10-09 NOTE — PROGRESS NOTES
To be completed in Nursing note:    Please reference list for forms that require a visit for completion.  Please remind patients that providers are given 3-5 business days to complete and return forms.      Form type:  Department of Health and Human services Centers for Medicare & Medicaid Services / Certificate of Medical Necessity CMS-848 - Transcutaneous Electrical nerve Stimulator (Tens)    Date form received:  09/16/2020    Date form completed by Physician:  09/18/2020    How was form returned to patient (mailed, faxed, or at  for patient to ):  Fax to [phone]      Date form mailed/faxed/left at  for patient and sent to HIM for scanning:  10/09/2020         Once form is left for patient, faxed, or mailed PCS will then close the documentation only encounter.

## 2020-10-13 ENCOUNTER — OFFICE VISIT (OUTPATIENT)
Dept: FAMILY MEDICINE | Facility: CLINIC | Age: 75
End: 2020-10-13
Payer: MEDICARE

## 2020-10-13 VITALS
BODY MASS INDEX: 29.32 KG/M2 | RESPIRATION RATE: 24 BRPM | SYSTOLIC BLOOD PRESSURE: 131 MMHG | HEART RATE: 49 BPM | DIASTOLIC BLOOD PRESSURE: 72 MMHG | TEMPERATURE: 98.4 F | WEIGHT: 176.2 LBS | OXYGEN SATURATION: 96 %

## 2020-10-13 DIAGNOSIS — Z23 NEED FOR PROPHYLACTIC VACCINATION AND INOCULATION AGAINST INFLUENZA: ICD-10-CM

## 2020-10-13 DIAGNOSIS — M10.9 ACUTE GOUTY ARTHRITIS: Primary | ICD-10-CM

## 2020-10-13 PROCEDURE — 99213 OFFICE O/P EST LOW 20 MIN: CPT | Mod: 25 | Performed by: STUDENT IN AN ORGANIZED HEALTH CARE EDUCATION/TRAINING PROGRAM

## 2020-10-13 PROCEDURE — 90662 IIV NO PRSV INCREASED AG IM: CPT | Performed by: STUDENT IN AN ORGANIZED HEALTH CARE EDUCATION/TRAINING PROGRAM

## 2020-10-13 PROCEDURE — G0008 ADMIN INFLUENZA VIRUS VAC: HCPCS | Performed by: STUDENT IN AN ORGANIZED HEALTH CARE EDUCATION/TRAINING PROGRAM

## 2020-10-13 RX ORDER — ALLOPURINOL 100 MG/1
100 TABLET ORAL DAILY
Qty: 30 TABLET | Refills: 1 | Status: SHIPPED | OUTPATIENT
Start: 2020-10-13 | End: 2020-11-06

## 2020-10-13 RX ORDER — COLCHICINE 0.6 MG/1
0.6 TABLET ORAL DAILY
Qty: 7 TABLET | Refills: 0 | Status: SHIPPED | OUTPATIENT
Start: 2020-10-13

## 2020-10-13 NOTE — PATIENT INSTRUCTIONS
- Thank you for coming  - Take Colchicine first  - Start Allopurinol after the first medication  - call the clinic if severe symptoms  - F/U with Dr. Pastor

## 2020-10-13 NOTE — PROGRESS NOTES
[facility] Clinic Note    Patient: Srikanth Graves  : 1945  MRN: [id number]         SUBJECTIVE       Srikanth Graves is a 75 year old male with a PMH significant for:  Patient Active Problem List   Diagnosis     Other constipation     Advance care planning     Bunion of great toe     Degeneration of cervical intervertebral disc     Chronic airway obstruction (H)     Chronic low back pain     Chronic pain disorder     Chronic obstructive pulmonary disease (H)     Coronary artery disease of native artery of native heart with stable angina pectoris (H)     Depression     DJD (degenerative joint disease), ankle and foot     Dyspnea on exertion     Edema     Esophageal reflux     Essential hypertension     Headache disorder     Peripheral vascular disease (H)     Primary open angle glaucoma of right eye, mild stage     Vitamin D deficiency     Cocaine abuse in remission (H)     Coccydynia     Clavus     Eye globe prosthesis     Hallux valgus, acquired     Neck pain     Routine adult health maintenance     Controlled substance agreement terminated     Testicular cyst     Intermittent chest pain     Mild episode of recurrent major depressive disorder (H)     Notalgia paresthetica     NS (nuclear sclerosis), right     Opioid type dependence (H)     Other syndromes affecting cervical region     Personal history of nicotine dependence     Sacroiliitis, not elsewhere classified (H)     Serum creatinine raised     Tobacco use disorder     Tubular adenoma of colon     CKD (chronic kidney disease) stage 3, GFR 30-59 ml/min      He presents to clinic today with chief complaint of pain and numbness on his both big toes.Patient has a history of gout for 3 years.  Last attack 6 months ago.  Usually the patient will have pain, swelling, redness on both big big toes.  Since yesterday, the patient started feeling numb on his both big toes.  This morning, patient had a very hard time to stand on his both feet due to  pain. Patient used colchicine and allopurinol in the past.      Past Medical History, Past Surgical History, Medications, Allergies, and Family History were reviewed and updated as needed.        REVIEW OF SYSTEMS     CONSTITUTIONAL: No fever or chills. No fatigue,  HEENT: No headache or neck pain.   RESPIRATORY: No cough, wheezes, or SOB.  CARDIOVASCULAR: No chest pain. No palpitations or irregular heart beats. No syncope. No lower extremity swelling.  GASTROINTESTINAL: No N/V, D/C. No abdominal pain, or bloating.  NEUROLOGIC:+ numbness on toes bilateral, no tingling.  PSYCHIATRIC: No depressed mood or excessively elevated mood        OBJECTIVE     Vitals:    10/13/20 1632   BP: 131/72   BP Location: Right arm   Patient Position: Sitting   Cuff Size: Adult Regular   Pulse: (!) 49   Resp: 24   Temp: 98.4  F (36.9  C)   TempSrc: Oral   SpO2: 96%   Weight: 79.9 kg (176 lb 3.2 oz)     Body mass index is 29.32 kg/m .    Physical Exam:  GENERAL: Awake, alert. Well-nourished.No acute distress.   LUNGS: No increased work of breathing; good air movement throughout all lung fields; breath sounds clear to auscultation bilaterally throughout all lung fields with no crackles or wheezing.  CARDIOVASCULAR: Regular rate and rhythm; normal S1 and S2; no S3 or S4; no murmur, rub or click.   ABDOMEN: Non-distended. Soft and non-tender in all quadrants; no masses or hepatosplenomegally.  MUSCULOSKELETAL: Gross deformity on both big toes, no erythema, or effusion of the joints. Full range of motion on both big toes    LABORATORY:  No results found for this or any previous visit (from the past 24 hour(s)).      ASSESSMENT AND PLAN   75 years old with with a past medical history of gout, for 3 years, complaining of numbness and pain on his back toes bilateral  1. Acute gouty arthritis  Patient has a previous history of gout for 3 years, last attack 6 months ago.  Patient came with complaint of numbness and pain on his big toes  bilateral  - Colchicine (COLCYRS) 0.6 MG tablet; Take 1 tablet (0.6 mg) by mouth daily  Dispense: 7 tablet; Refill: 0  - Allopurinol (ZYLOPRIM) 100 MG tablet; Take 1 tablet (100 mg) by mouth daily  Dispense: 30 tablet; Refill: 1 everything       Return to clinic in 3-4 for follow-up of gout for or sooner if develops new or worsening symptoms.    Patient was discussed with attending physician, Dr. John Coelho MD, who agrees with the assessment and plan.    Bella Franco, PGY-1 and today ligated [facility] or  [facility]  10/13/2020

## 2020-10-13 NOTE — PROGRESS NOTES
Preceptor Attestation:    Patient seen and evaluated in person. I discussed the patient with the resident. I have verified the content of the note, which accurately reflects my assessment of the patient and the plan of care.   Supervising Physician:  John Coelho MD.

## 2020-10-15 ENCOUNTER — TRANSFERRED RECORDS (OUTPATIENT)
Dept: HEALTH INFORMATION MANAGEMENT | Facility: CLINIC | Age: 75
End: 2020-10-15

## 2020-10-16 ENCOUNTER — TRANSFERRED RECORDS (OUTPATIENT)
Dept: HEALTH INFORMATION MANAGEMENT | Facility: CLINIC | Age: 75
End: 2020-10-16

## 2020-11-04 ENCOUNTER — OFFICE VISIT (OUTPATIENT)
Dept: FAMILY MEDICINE | Facility: CLINIC | Age: 75
End: 2020-11-04
Payer: MEDICARE

## 2020-11-04 VITALS
OXYGEN SATURATION: 96 % | TEMPERATURE: 98.3 F | RESPIRATION RATE: 20 BRPM | BODY MASS INDEX: 29.65 KG/M2 | DIASTOLIC BLOOD PRESSURE: 71 MMHG | SYSTOLIC BLOOD PRESSURE: 131 MMHG | WEIGHT: 178.2 LBS | HEART RATE: 53 BPM

## 2020-11-04 DIAGNOSIS — M10.9 ACUTE GOUTY ARTHRITIS: ICD-10-CM

## 2020-11-04 DIAGNOSIS — H91.93 HEARING DEFICIT, BILATERAL: Primary | ICD-10-CM

## 2020-11-04 PROCEDURE — 99214 OFFICE O/P EST MOD 30 MIN: CPT | Performed by: FAMILY MEDICINE

## 2020-11-04 RX ORDER — PREDNISONE 10 MG/1
TABLET ORAL
Qty: 20 TABLET | Refills: 0 | Status: SHIPPED | OUTPATIENT
Start: 2020-11-04 | End: 2021-01-22

## 2020-11-04 NOTE — NURSING NOTE
For conditioning purpose only  Right ear: 40db at 1000Hz: absent    Right Ear:    20db at 1000Hz: absent  20db at 2000Hz: absent  20db at 4000Hz: absent  20db at 6000Hz (11 years and older): absent    Left Ear:    20db at 6000Hz (11 years and older): absent  20db at 4000Hz: absent  20db at 2000Hz: absent  20db at 1000Hz: absent    Right Ear:    25db at 500Hz: absent    Left Ear:    25db at 500Hz: absent    Hearing Screen:  Fail--Did not hear at least one tone        Moise Nichols MA

## 2020-11-04 NOTE — PROGRESS NOTES
S: Srikanth Graves is a 75 year old male who returns for follow up of  Foot pain/defor,itiesd, follow with podiatry   2 refill gout medications, 3 hearing issues  Patients states that main concern today is  hearing issues    PMHX/PSHX/MEDS/ALLERGIES/SHX/FHX reviewed and updated in Epic.      ROS:  General: No fevers, chills  Head: No headache  Ears: No acute change in hearing.    CV: No chest pain or palpitations.  Resp: No shortness of breath.  No cough. No hemoptysis.  GI: No nausea, vomiting, constipation, diarrhea  : No urinary pains    O: /71 (BP Location: Left arm, Patient Position: Sitting, Cuff Size: Adult Small)   Pulse 53   Temp 98.3  F (36.8  C) (Oral)   Resp 20   Wt 80.8 kg (178 lb 3.2 oz)   SpO2 96%   BMI 29.65 kg/m     Gen:  Well nourished and in NAD  HEENT: PERRLA;    prosthesis   normal color and landmarks; nasopharynx pink and moist; oropharynx pink and moist  Neck: supple without lymphadenopathy  CV:  RRR  - no murmurs, rubs, or gallups,   Pulm:  CTAB, no wheezes/rales/rhonchi, good air entry   ABD: soft, nontender, no masses, no rebound, BS intact throughout  Extrem: no cyanosis, edema or clubbing  Psych: Euthymic      1 Hearing deficit/ENT referral will need hearing aids  2  Gouty arthritis: prednisone short course  3 Severe hallus valgus: seen by podiatry has decline surgical intervention   consider podiatry for foot care, ie shoes etc        RTC in 12  weeks, for follow up of coordination of care or sooner if develops new or worsening symptoms.    Rohit Pastor MD      
I personally performed the service described in the documentation recorded by the scribe in my presence, and it accurately and completely records my words and actions.

## 2020-11-04 NOTE — PATIENT INSTRUCTIONS
ENT referral/hearin donnie;l   Podiatry: foot care   Dermatology skin scalp lesion   Gout medication    11/05/20   OTOLARYNGOLOGY REFERRAL     [facility]  Phone: [phone]  Fax: [phone]    Referral, demographics and office note faxed to [phone]. They will contact pt to schedule.     Meghan Rosario

## 2020-11-06 ENCOUNTER — AMBULATORY - HEALTHEAST (OUTPATIENT)
Dept: ADMINISTRATIVE | Facility: CLINIC | Age: 75
End: 2020-11-06

## 2020-11-06 DIAGNOSIS — H91.93 HEARING DEFICIT, BILATERAL: ICD-10-CM

## 2020-11-20 ENCOUNTER — OFFICE VISIT - HEALTHEAST (OUTPATIENT)
Dept: AUDIOLOGY | Facility: CLINIC | Age: 75
End: 2020-11-20

## 2020-11-20 ENCOUNTER — COMMUNICATION - HEALTHEAST (OUTPATIENT)
Dept: ADMINISTRATIVE | Facility: CLINIC | Age: 75
End: 2020-11-20

## 2020-11-20 ENCOUNTER — OFFICE VISIT - HEALTHEAST (OUTPATIENT)
Dept: OTOLARYNGOLOGY | Facility: CLINIC | Age: 75
End: 2020-11-20

## 2020-11-20 DIAGNOSIS — L29.9 EAR ITCHING: ICD-10-CM

## 2020-11-20 DIAGNOSIS — H90.3 SENSORINEURAL HEARING LOSS, BILATERAL: ICD-10-CM

## 2020-11-20 DIAGNOSIS — H91.90 HEARING DIFFICULTY, UNSPECIFIED LATERALITY: ICD-10-CM

## 2020-11-20 DIAGNOSIS — H90.3 SENSORINEURAL HEARING LOSS (SNHL) OF BOTH EARS: ICD-10-CM

## 2020-12-01 ENCOUNTER — ANCILLARY PROCEDURE (OUTPATIENT)
Dept: GENERAL RADIOLOGY | Facility: CLINIC | Age: 75
End: 2020-12-01
Attending: FAMILY MEDICINE
Payer: MEDICARE

## 2020-12-01 ENCOUNTER — OFFICE VISIT (OUTPATIENT)
Dept: FAMILY MEDICINE | Facility: CLINIC | Age: 75
End: 2020-12-01
Payer: MEDICARE

## 2020-12-01 VITALS
RESPIRATION RATE: 20 BRPM | TEMPERATURE: 98.7 F | BODY MASS INDEX: 30.22 KG/M2 | WEIGHT: 177 LBS | OXYGEN SATURATION: 96 % | DIASTOLIC BLOOD PRESSURE: 85 MMHG | HEIGHT: 64 IN | HEART RATE: 58 BPM | SYSTOLIC BLOOD PRESSURE: 142 MMHG

## 2020-12-01 DIAGNOSIS — M79.644 THUMB PAIN, RIGHT: Primary | ICD-10-CM

## 2020-12-01 DIAGNOSIS — M79.644 THUMB PAIN, RIGHT: ICD-10-CM

## 2020-12-01 PROBLEM — H54.40 BLINDNESS OF LEFT EYE: Status: ACTIVE | Noted: 2020-12-01

## 2020-12-01 PROCEDURE — 99214 OFFICE O/P EST MOD 30 MIN: CPT | Performed by: FAMILY MEDICINE

## 2020-12-01 PROCEDURE — 73140 X-RAY EXAM OF FINGER(S): CPT | Mod: RT | Performed by: RADIOLOGY

## 2020-12-01 ASSESSMENT — MIFFLIN-ST. JEOR: SCORE: 1466

## 2020-12-01 NOTE — PATIENT INSTRUCTIONS
you have pain in you thumb probably from twisting forcefully  Naproxen twice  a day for 5 to 7 day  x ray done today/ un officially ok   follow-up as needed

## 2020-12-01 NOTE — PROGRESS NOTES
"S: Srikanth Graves is a 75 year old male who returns for follow up of  Right thrum pain. He was states that his PCA twisted his thumb forcefully one month ago.  \"They were playing around\"  He heard a  noise in his thumb  joints and developed pain/discomfort   Now discomfort is mostly at base of thumb He is able to use thumb and hand  This happened one ,omt ago Pain is getting better, Request x rays    PMH: Has Hx of gout  and DJD  Patients states that main concern today is Thumb pain.    PMHX/PSHX/MEDS/ALLERGIES/SHX/FHX reviewed and updated in Epic.      ROS:  General: No fevers, chills  Head: No headache  Ears: No acute change in hearing.    CV: No chest pain or palpitations.  Resp: No shortness of breath.  No cough. No hemoptysis.  GI: No nausea, vomiting, constipation, diarrhea  : No urinary pains    O: BP (!) 142/85 (BP Location: Right arm, Patient Position: Sitting, Cuff Size: Adult Regular)   Pulse 58   Temp 98.7  F (37.1  C) (Oral)   Resp 20   Ht 1.637 m (5' 4.45\")   Wt 80.3 kg (177 lb)   SpO2 96%   BMI 29.96 kg/m     Gen:  Well nourished and in NAD    Neck: supple without lymphadenopathy  CV:  RRR  - no murmurs, rubs, or gallups,   Pulm:  CTAB, no wheezes/rales/rhonchi, good air entry   ABD: soft, nontender, no masses, no rebound, BS intact throughout  Extrem: no cyanosis, edema or clubbing   Hands; DJD right thumb PIP and DIP full ROM  Mild tenderness at PIP joint   Psych: Euthymic      (M79.644) Thumb pain, right  (primary encounter diagnosis)  Comment: normal exam  Plan: XR Finger Right G/E 2 Views        Un officially/no fractures      Naproxen and self PT ROM   Expects to have full reovery    Total of 25 minutes was spent in face to face contact with patient with > 50% in counseling and coordination of care.  Options for treatment and/or follow-up care were reviewed with the patient. Srikanth Graves was engaged and actively involved in the decision making process. He verbalized understanding of " the options discussed and was satisfied with the final plan.    RTC as needed or sooner if develops new or worsening symptoms.    Rohit Pastor MD

## 2020-12-03 ENCOUNTER — TELEPHONE (OUTPATIENT)
Dept: FAMILY MEDICINE | Facility: CLINIC | Age: 75
End: 2020-12-03

## 2020-12-03 NOTE — TELEPHONE ENCOUNTER
Dr. Coelho,  Can you please address this message?  I saw that patient had an appointment with Dr. Pastor on 12/01/2020, but didn't see the medication showed up under the medication list on that day.  Thank you.  JAY JAY Cheney

## 2020-12-03 NOTE — TELEPHONE ENCOUNTER
[facility] phone call message- medication clarification/question:    Full Medication Name:     naproxen (NAPROSYN) 500 MG tablet and ear drops    Question:     Pt had visit with Dr. Pastor and was told meds were going to be sent to pharmacy, but pharmacy hasn't received anything.     Pharmacy confirmed as      [id number] - [place], [place] - [address]: Yes     OK to leave a message on voice mail? Yes    Primary language: English      needed? No    Call taken on December 3, 2020 at 9:20 AM by Britta Rose CMA

## 2020-12-04 DIAGNOSIS — B35.3 TINEA PEDIS OF LEFT FOOT: ICD-10-CM

## 2020-12-04 DIAGNOSIS — N50.811 TESTICULAR PAIN, RIGHT: ICD-10-CM

## 2020-12-04 NOTE — TELEPHONE ENCOUNTER
I called patient to find out what the ear drops are for and he said he has itching and went to see ear specialist by [facility].  The doctor told him they would send over the medication to his pharmacy, but when he went there, there was no ear drops for him.  He also needs Naproxen.   He uses [facility] that located on [address] [place] [address] in [place].  Thank you.  JAY JAY Cheney

## 2020-12-08 ENCOUNTER — TELEPHONE (OUTPATIENT)
Dept: FAMILY MEDICINE | Facility: CLINIC | Age: 75
End: 2020-12-08

## 2020-12-08 ENCOUNTER — DOCUMENTATION ONLY (OUTPATIENT)
Dept: FAMILY MEDICINE | Facility: CLINIC | Age: 75
End: 2020-12-08

## 2020-12-08 RX ORDER — NAPROXEN 500 MG/1
500 TABLET ORAL 2 TIMES DAILY WITH MEALS
Qty: 90 TABLET | Refills: 1 | Status: SHIPPED | OUTPATIENT
Start: 2020-12-08

## 2020-12-08 NOTE — TELEPHONE ENCOUNTER
Request noted.  We are available to fill out his form.  Please contact Srikanth and have him fill out his portion of the form; then mail or drop it off at Clinic.    John Coelho MD

## 2020-12-08 NOTE — TELEPHONE ENCOUNTER
I also do not know what ear drop he was intended to get because when I called him, he just told me he had itchiness.    Thank you.  JAY JAY Cheney

## 2020-12-08 NOTE — TELEPHONE ENCOUNTER
"FYI,  \"Letter\" created, printed and signed.  Arrangements being made to mail to Mr. Jeremy SCHULER  "

## 2020-12-08 NOTE — TELEPHONE ENCOUNTER
Patient states he follows a low Na++ diet (see original note)    Note routed to Dr.Berg Briana VIERA

## 2020-12-08 NOTE — TELEPHONE ENCOUNTER
[facility] phone call message- general phone call:    Reason for call: the Pt called to ask for a letter for a special diet to give to [facility] so he can receive the extra fernando for the food stamps he needs. And he would like a call back asap .    Return call needed: Yes    OK to leave a message on voice mail? Yes    Primary language: English      needed? No    Call taken on December 8, 2020 at 10:40 AM by Yassine Still

## 2020-12-08 NOTE — TELEPHONE ENCOUNTER
Patient informed to bring form into clinic and it will be completed patient states he has never needed a form before.Stating his heart  Usually just writes a letter.  Pt ended conversation     Note routed to Dr.Berg Briana VIERA

## 2020-12-08 NOTE — TELEPHONE ENCOUNTER
Message left informing pt  has written the letter he requested and it will be mailed to him    BTRN

## 2021-01-01 DIAGNOSIS — R12 HEARTBURN: ICD-10-CM

## 2021-01-04 RX ORDER — FAMOTIDINE 20 MG/1
TABLET, FILM COATED ORAL
Qty: 180 TABLET | Refills: 1 | Status: SHIPPED | OUTPATIENT
Start: 2021-01-04

## 2021-01-08 ENCOUNTER — COMMUNICATION - HEALTHEAST (OUTPATIENT)
Dept: ADMINISTRATIVE | Facility: CLINIC | Age: 76
End: 2021-01-08

## 2021-01-08 ENCOUNTER — OFFICE VISIT (OUTPATIENT)
Dept: FAMILY MEDICINE | Facility: CLINIC | Age: 76
End: 2021-01-08
Payer: MEDICARE

## 2021-01-08 VITALS
DIASTOLIC BLOOD PRESSURE: 69 MMHG | SYSTOLIC BLOOD PRESSURE: 137 MMHG | BODY MASS INDEX: 29.28 KG/M2 | WEIGHT: 173 LBS | TEMPERATURE: 98.3 F | HEART RATE: 55 BPM | OXYGEN SATURATION: 97 % | RESPIRATION RATE: 22 BRPM

## 2021-01-08 DIAGNOSIS — J44.1 COPD EXACERBATION (H): Primary | ICD-10-CM

## 2021-01-08 DIAGNOSIS — K59.09 OTHER CONSTIPATION: ICD-10-CM

## 2021-01-08 LAB
SARS-COV-2 RNA RESP QL NAA+PROBE: NOT DETECTED
SPECIMEN SOURCE: NORMAL

## 2021-01-08 PROCEDURE — 99214 OFFICE O/P EST MOD 30 MIN: CPT | Mod: GC | Performed by: STUDENT IN AN ORGANIZED HEALTH CARE EDUCATION/TRAINING PROGRAM

## 2021-01-08 PROCEDURE — 87635 SARS-COV-2 COVID-19 AMP PRB: CPT | Performed by: STUDENT IN AN ORGANIZED HEALTH CARE EDUCATION/TRAINING PROGRAM

## 2021-01-08 RX ORDER — DOCUSATE SODIUM 100 MG/1
100 CAPSULE, LIQUID FILLED ORAL 2 TIMES DAILY
Qty: 90 CAPSULE | Refills: 3 | Status: SHIPPED | OUTPATIENT
Start: 2021-01-08 | End: 2021-01-08

## 2021-01-08 RX ORDER — DOXYCYCLINE 100 MG/1
100 CAPSULE ORAL 2 TIMES DAILY
Qty: 14 CAPSULE | Refills: 0 | Status: SHIPPED | OUTPATIENT
Start: 2021-01-08 | End: 2021-01-12

## 2021-01-08 RX ORDER — PREDNISONE 20 MG/1
40 TABLET ORAL DAILY
Qty: 10 TABLET | Refills: 0 | Status: SHIPPED | OUTPATIENT
Start: 2021-01-08 | End: 2021-01-08

## 2021-01-08 RX ORDER — DOXYCYCLINE 100 MG/1
100 CAPSULE ORAL 2 TIMES DAILY
Qty: 14 CAPSULE | Refills: 0 | Status: SHIPPED | OUTPATIENT
Start: 2021-01-08 | End: 2021-01-08

## 2021-01-08 RX ORDER — PREDNISONE 20 MG/1
40 TABLET ORAL DAILY
Qty: 10 TABLET | Refills: 0 | Status: SHIPPED | OUTPATIENT
Start: 2021-01-08 | End: 2021-01-12

## 2021-01-08 RX ORDER — DOCUSATE SODIUM 100 MG/1
100 CAPSULE, LIQUID FILLED ORAL 2 TIMES DAILY
Qty: 90 CAPSULE | Refills: 3 | Status: SHIPPED | OUTPATIENT
Start: 2021-01-08 | End: 2021-03-30

## 2021-01-08 NOTE — PROGRESS NOTES
Preceptor Attestation:   Patient seen, evaluated and discussed with the resident. I have verified the content of the note, which accurately reflects my assessment of the patient and the plan of care.   Supervising Physician:  Alba Angelo MD

## 2021-01-08 NOTE — PROGRESS NOTES
Josh Duke is a 75 year old who presents to clinic today for the following health issues: productive cough    TJ     Presents to clinic with chief complaint of productive cough.    Has had productive cough for the past 10 days.  Initially had clear phlegm which now appears dark green.  Feels more short of breath than normal.  Reports that his throat is still sore because he is coughing up some sputum.  Concerned about pneumonia.    Does have history of COPD.  No current wheezing.  No fevers or chills.  No runny nose or nasal congestion.  No earache.  No chest discomfort.  No longer taking controller inhaler.    No sick contacts or known exposures to COVID-19 virus.  Tested negative for COVID-19 approximately 1.5 months ago.  No loss of taste or smell.  No skin rash.  No nausea, vomiting, or abdominal pain.  No fatigue or headache.  No muscle aches.  No diarrhea.  Would like to switch from senna to Colace for constipation.    Review of Systems   CONSTITUTIONAL: See above.  HEENT: See above.  SKIN: See above.  RESPIRATORY: See above.  CARDIOVASCULAR: See above.  GASTROINTESTINAL: See above.  MUSCULOSKELETAL: See above.        Objective    /69 (BP Location: Right arm, Patient Position: Sitting, Cuff Size: Adult Large)   Pulse 55   Temp 98.3  F (36.8  C) (Oral)   Resp 22   Wt 78.5 kg (173 lb)   SpO2 97%   BMI 29.28 kg/m    Body mass index is 29.28 kg/m .     Physical Exam   GENERAL: Awake, alert. Well-nourished, well-developed. No acute distress. Appears comfortable.  HEENT: Head: Normocephalic and atraumatic; no dysmorphic features. Eyes: Eye lids and lashes normal; pupils equal, round and reactive to light; Ears: Pinnae appear normal; external auditory canals patent; tympanic membranes pearly and opaque without erythema, effusion or bulging. Oropharynx: mucus membranes pink and moist; tonsils without enlargement, erythema or exudates.  NECK: Supple and symmetric. Trachea midline. No anterior  or posterior cervical lymphadenopathy, no supraclavicular lymphadenopathy.   LUNGS: No increased work of breathing; breath sounds mostly clear to auscultation bilaterally throughout with scattered wheezes. all lung fields with no crackles or wheezing; expiratory phase prolonged.  CARDIOVASCULAR: Regular rate and rhythm; normal S1 and S2; no S3 or S4; no murmur, rub or click. Normal capillary refill.  ABDOMEN: Non-distended. Soft and non-tender in all quadrants.  PSYCH: Normal affect, mood, orientation, memory and insight.    Assessment & Plan     COPD exacerbation (H)  10-day history of increased cough, sputum production, and dyspnea.  Differential includes COPD exacerbation, pneumonia, bronchitis, viral URI, COVID-19 virus.  Afebrile with normal oxygen saturation; breathing comfortably on exam with scattered wheezing and prolonged expiratory phase.  No acute respiratory distress on exam.  Suspect COPD exacerbation so will treat with steroid and antibiotics; antibiotics would also cover for community acquired pneumonia.  COVID-19 PCR test obtained as well; self quarantine isolation precautions discussed given symptoms.  Will restart umeclidinium inhaler today.  - COVID-19 Virus PCR F ([facility])  - doxycycline hyclate (VIBRAMYCIN) 100 MG capsule  Dispense: 14 capsule; Refill: 0  - predniSONE (DELTASONE) 20 MG tablet  Dispense: 10 tablet; Refill: 0  - umeclidinium (INCRUSE ELLIPTA) 62.5 MCG/INH inhaler  Dispense: 30 each; Refill: 3    Other constipation  Requesting to switch from Senna back to Colace.  New script for Colace provided.  - docusate sodium (COLACE) 100 MG capsule  Dispense: 90 capsule; Refill: 3      25 minutes spent on the date of the encounter doing chart review, history and exam, documentation and further activities as noted above    Clinician location:  [facility]    Return to clinic in 1 week if develops new, persistent, or worsening symptoms.    Patient was discussed  with attending physician, Dr. Alba Angelo MD, who agrees with the assessment and plan.    Garret Paige, PGY-3  [facility]  01/08/21

## 2021-01-10 ENCOUNTER — TELEPHONE (OUTPATIENT)
Dept: FAMILY MEDICINE | Facility: CLINIC | Age: 76
End: 2021-01-10

## 2021-01-10 NOTE — TELEPHONE ENCOUNTER
Informed patient of negative COVID-19 results.    If patient had close exposure to someone with known COVID-19 (within 6 ft >15 min), provide follow isolation instructions based on patient's exposure, test, and work. Day of test is considered day 0.      You should stay away from others for 14 days if:  Someone in your home has COVID-19.  You live in a building with other people, where it s hard to stay away from others and easy to spread the virus to multiple people, like a long-term care facility.          You may consider being around others after 10 days if:  You do not have any symptoms.  You have not had a positive test for COVID-19.  No one in your home has COVID-19, and you do not live in a building with other people, where it s hard to stay away from others and easy to spread the virus to multiple people, like a long-term care facility.    Even after 10 days you must still:  Watch for symptoms through day 14. If you have any symptoms, stay home, separate yourself from others, and get tested right away.  Continue to wear a mask and stay at least 6 feet away from other people.          You may consider being around others after seven days only if:  You get tested for COVID-19 at least five full days after you had close contact with someone with COVID-19, and the test is negative.  You do not have any symptoms.  You have not had a positive test for COVID-19.  No one in your home has COVID-19, and you do not live in a building with other people, where it s hard to stay away from others and easy to spread the virus to multiple people, like a long-term care facility.    Even after seven days you must still:  Watch for symptoms through day 14. If you have any symptoms, stay home, separate yourself from others, and get tested right away.  Continue to wear a mask and stay at least 6 feet away from other people.        If patient continues to have ongoing exposure to someone with known COVID-19 (e.g. parent,  caretaker), patient needs to quarantine for 14 days after the last exposure to this person during their (the exposure's) 10 days in quarantine. As long as the exposure has no prolonged fever or symptoms (which can extend quarantine time), patient will have to isolate for:    10 days + 14 days after date of exposure's initial symptoms for those exposed to symptomatic patients  10 days + 14 days after date of exposure's positive test for those exposed to asymptomatic patients      If patient was symptomatic at time of testing and remains symptomatic for >72 hours after time of test, can repeat COVID-19 testing.              Alexandrea Henry MD PGY-2  [facility]

## 2021-01-11 ENCOUNTER — TELEPHONE (OUTPATIENT)
Dept: FAMILY MEDICINE | Facility: CLINIC | Age: 76
End: 2021-01-11

## 2021-01-11 NOTE — TELEPHONE ENCOUNTER
[facility] phone call message- general phone call:    Reason for call: He was seen on Friday by  and he was diagnosis with pneumonia and was prescribed some medication he had the doctor print the RX for him he took it to his pharmacy and they told him his insurance wont pay for it.he needs a call back.  Action desired: call back.    Return call needed: Yes    OK to leave a message on voice mail? Yes    Advised patient to response may take up to 2 business days: Yes    Primary language: English      needed? No    Call taken on January 11, 2021 at 8:18 AM by Carla Rosa

## 2021-01-11 NOTE — RESULT ENCOUNTER NOTE
I spoke with the patient via telephone call and communicated these results.    Garret Paige MD  January 11, 2021

## 2021-01-12 RX ORDER — DOXYCYCLINE 100 MG/1
100 CAPSULE ORAL 2 TIMES DAILY
Qty: 14 CAPSULE | Refills: 0 | Status: SHIPPED | OUTPATIENT
Start: 2021-01-12

## 2021-01-12 RX ORDER — PREDNISONE 20 MG/1
40 TABLET ORAL DAILY
Qty: 10 TABLET | Refills: 0 | Status: SHIPPED | OUTPATIENT
Start: 2021-01-12 | End: 2021-01-22

## 2021-01-13 NOTE — TELEPHONE ENCOUNTER
New prescription sent to different pharmacy.  Social work to look into insurance issue.    Garret Paige MD  January 13, 2021

## 2021-01-15 ENCOUNTER — TELEPHONE (OUTPATIENT)
Dept: FAMILY MEDICINE | Facility: CLINIC | Age: 76
End: 2021-01-15

## 2021-01-15 NOTE — TELEPHONE ENCOUNTER
Dr. Paige reached out with a question regarding insurance covering medications for Srikanth, [facility] pharmacy was stating that the ordered prednisone and doxycycline for COPD were not covered by Medicare.     [place] [facility] as Medicare A and B. Consulted with PharmD Dr. Garcia regarding this. She called [facility] [address] and confirmed that these medications are covered with no copay and are ready to be picked up, along with other meds.     SW called Srikanth to inform him of this. He agreed and will go to the pharmacy today.  Routing to Dr. Paige for FYI.      ANNELISE Medina

## 2021-01-21 ENCOUNTER — VIRTUAL VISIT (OUTPATIENT)
Dept: FAMILY MEDICINE | Facility: CLINIC | Age: 76
End: 2021-01-21
Payer: MEDICARE

## 2021-01-21 ENCOUNTER — TELEPHONE (OUTPATIENT)
Dept: FAMILY MEDICINE | Facility: CLINIC | Age: 76
End: 2021-01-21

## 2021-01-21 VITALS — WEIGHT: 176 LBS | DIASTOLIC BLOOD PRESSURE: 79 MMHG | SYSTOLIC BLOOD PRESSURE: 128 MMHG | BODY MASS INDEX: 29.79 KG/M2

## 2021-01-21 DIAGNOSIS — I25.118 CORONARY ARTERY DISEASE OF NATIVE ARTERY OF NATIVE HEART WITH STABLE ANGINA PECTORIS (H): ICD-10-CM

## 2021-01-21 DIAGNOSIS — N18.30 STAGE 3 CHRONIC KIDNEY DISEASE, UNSPECIFIED WHETHER STAGE 3A OR 3B CKD (H): ICD-10-CM

## 2021-01-21 DIAGNOSIS — J44.1 COPD EXACERBATION (H): Primary | ICD-10-CM

## 2021-01-21 DIAGNOSIS — F33.0 MILD EPISODE OF RECURRENT MAJOR DEPRESSIVE DISORDER (H): ICD-10-CM

## 2021-01-21 PROCEDURE — 99443 PR PHYSICIAN TELEPHONE EVALUATION 21-30 MIN: CPT | Mod: 95 | Performed by: FAMILY MEDICINE

## 2021-01-21 NOTE — PROGRESS NOTES
"Srikanth is a 75 year old who is being evaluated via a billable telephone visit.      What phone number would you like to be contacted at? [phone]  How would you like to obtain your AVS?   Patient doesn't want it   Assessment & Plan     COPD exacerbation (H)  Continued cough productive of green sputum despite course of Doxycycline.  PRN inhaler prescribed last visit but not covered by insurance.  Add albuterol.  Review of past CXR some years back demonstrated hyperinflation suggesting COPD.  PFT's from 2017 demonstrated FEV1 around 2.5    - albuterol (PROAIR HFA/PROVENTIL HFA/VENTOLIN HFA) 108 (90 Base) MCG/ACT inhaler; Inhale 2 puffs into the lungs every 6 hours  - levofloxacin (LEVAQUIN) 500 MG tablet; Take 1 tablet (500 mg) by mouth daily for 7 days    Mild episode of recurrent major depressive disorder (H)  Stable    Coronary artery disease of native artery of native heart with stable angina pectoris (H)  Stable; accessed.  Not exacerbated by current infection. No angina ot NTG use. High risk for exacerbation    Stage 3 chronic kidney disease, unspecified whether stage 3a or 3b CKD  Stable, Impacts choice of treatment      Review of external notes as documented above       956}     BMI:   Estimated body mass index is 29.79 kg/m  as calculated from the following:    Height as of 12/1/20: 1.637 m (5' 4.45\").    Weight as of this encounter: 79.8 kg (176 lb).     Schedule follow up next week for exam, Oxygen saturation and possible CXR    John Coelho MD  [facility]    Subjective     Srikanth is a 75 year old who presents to clinic today for the following health issues: cough continues. Finishing course of prednisone and doxycycling.  No better    HPI             Objective           Vitals:  No vitals were obtained today due to virtual visit.    Physical Exam   healthy, alert, no distress and mild distress  PSYCH: Alert and oriented times 3; coherent speech, normal   rate and volume, able to " articulate logical thoughts, able   to abstract reason, no tangential thoughts, no hallucinations   or delusions  His affect is normal  RESP: Isable to talk in full sentences  Remainder of exam unable to be completed due to telephone visits    Office Visit on 01/08/2021   Component Date Value Ref Range Status     COVID-19 Virus PCR to [facility] - So* 01/08/2021 Nasopharyngeal   Final     COVID-19 Virus PCR to [facility] - Re* 01/08/2021 Not Detected   Final    Comment: Collection of multiple specimens from the same patient may be necessary to  detect the virus. The possibility of a false negative should be considered if  the patient's recent exposure or clinical presentation suggests 2019 nCOV  infection and diagnostic tests for other causes of illness are negative.   Repeat  testing may be considered in this setting.  Patient sample was heat inactivated and amplified using the HDPCR SARS-CoV-2  assay ([employer] The HDPCRTM SARS-CoV-2 assay is a reverse  transcription real-time polymerase chain reaction (qRT-PCR) test intended for  the qualitative detection of nucleic acid  from SARS-CoV-2 in human nasopharyngeal swabs, oropharyngeal swabs, anterior  nasal swabs, mid-turbinate nasal swabs as well as nasal aspirate, nasal wash,  and bronchoalveolar lavage (BAL) specimens from individuals who are suspected  of COVID-19 by their healthcare provider.  A negative result does not rule out the presence of real-time PCR inhibitors   in  the specimen                            or COVID-19 RNA in concentrations below the limit of detection of  the assay. The possibility of a false negative should be considered if the  patients recent exposure or clinical presentation suggests COVID-19.   Additional  testing or repeat testing requires consultation with the laboratory.  Nasopharyngeal specimen is the preferred choice for swab-based SARS CoV2  testing. When collection of a nasopharyngeal swab is not possible the    following  are acceptable alternatives:  an oropharyngeal (OP) specimen collected by a healthcare professional, or a  nasal mid-turbinate (NMT) swab collected by a healthcare professional or by  onsite self-collection (using a flocked tapered swab), or an anterior nares  specimen collected by a healthcare professional or by onsite self-collection  (using a round foam swab). (Centers for Disease Control)  Testing performed by [facility] ([facility]) [address] [address]                             The test performance characteristics were determined by [facility]. It has not been  cleared or approved by the FDA.  The laboratory is regulated under the Clinical Laboratory Improvement  Amendments of 1988 (CLIA-88) as qualified to perform high-complexity testing.  This test is used for clinical purposes. It should not be regarded as  investigational or for research.  Performed and/or entered by:  [address] [address]                  Phone call duration: 28 minutes

## 2021-01-21 NOTE — PROGRESS NOTES
Srikanth is a 75 year old who is being evaluated via a billable telephone visit.      What phone number would you like to be contacted at? [phone]  How would you like to obtain your AVS? Declined    Assessment & Plan:   1.COPD exacerbation: Pt symptoms including productive cough have not improved since his visit on 1/8 for the same symptoms. COVID-19 test negative. Reports no relief with doxycycline or prednisone. Inhaler prescribed was not covered by insurance. FEV1 was 2.3 in 2017. FEV1/FVC at that time didn't meet obstruction criteria. Plan is to try levofloxacin and albuterol inhaler. Pt will follow up in clinic to lung exam, oxygen saturation and possibly chest x-ray.   -Start levofloxacin x7 days   -Start albuterol inhaler  -Continue breo ellipta   -Follow-up in 1 week     Subjective     Srikanth is a 75 year old who presents to clinic today for the following health issues:     HPI   The patient presented to clinic with a 10-day history of productive cough on 1/8. COVID-19 test negative. Started on doxycycline, prednisone and inhaler for suspected COPD exacerbation. The patient reports that his symptoms of productive cough, rib soreness (R>L) and breathing difficulties have worsened since his last visit. He says that the antibiotic (last dose today) and prednisone have not provided any relief and he was unable to  the inhaler. He says that his cough is productive of green sputum, but at times he feels that he isn't able to clear his chest with coughing. In terms of the rib soreness, the patient says that he notices it most when he is turning or coughing. No pleuritic chest pain. He has not taken any pain medication for the soreness. The patient reports that he feels more short of breath and has been wheezing more. He can walk 1/2 block before coming short of breath. Denies PND, orthopnea or leg swelling. Denies fever, chills, myalgias, sore throat.    Review of Systems   Constitutional, HEENT (seeing  opthalmology on 1/26), cardiovascular, pulmonary, gi and gu systems are negative, except as otherwise noted.      Objective         Vitals:  No vitals were obtained today due to virtual visit.    Physical Exam   Alert and no distress  PSYCH: Alert and oriented times 3; coherent speech, normal rate and volume, able to articulate logical thoughts, able to abstract reason, no tangential thoughts, no hallucinations   or delusions  His affect is normal  RESP: No cough, no audible wheezing, able to talk in full sentences  Remainder of exam unable to be completed due to telephone visits          Phone call duration: 20 minutes

## 2021-01-22 DIAGNOSIS — J44.1 COPD EXACERBATION (H): ICD-10-CM

## 2021-01-22 RX ORDER — ALBUTEROL SULFATE 90 UG/1
2 AEROSOL, METERED RESPIRATORY (INHALATION) EVERY 6 HOURS
Qty: 18 G | Refills: 0 | Status: SHIPPED | OUTPATIENT
Start: 2021-01-22 | End: 2021-01-22

## 2021-01-22 RX ORDER — LEVOFLOXACIN 500 MG/1
500 TABLET, FILM COATED ORAL DAILY
Qty: 7 TABLET | Refills: 0 | Status: SHIPPED | OUTPATIENT
Start: 2021-01-22 | End: 2021-01-22

## 2021-01-22 RX ORDER — LEVOFLOXACIN 500 MG/1
500 TABLET, FILM COATED ORAL DAILY
Qty: 7 TABLET | Refills: 0 | Status: SHIPPED | OUTPATIENT
Start: 2021-01-22 | End: 2021-03-30

## 2021-01-22 RX ORDER — ALBUTEROL SULFATE 90 UG/1
2 AEROSOL, METERED RESPIRATORY (INHALATION) EVERY 6 HOURS
Qty: 18 G | Refills: 0 | Status: SHIPPED | OUTPATIENT
Start: 2021-01-22 | End: 2021-02-16

## 2021-01-22 NOTE — TELEPHONE ENCOUNTER
I sent inhaler and antibiotic prescriptions from yesterday to [facility] just now.    MB

## 2021-01-25 ENCOUNTER — TELEPHONE (OUTPATIENT)
Dept: FAMILY MEDICINE | Facility: CLINIC | Age: 76
End: 2021-01-25

## 2021-01-25 NOTE — TELEPHONE ENCOUNTER
Prior Authorization Specialty Medication Request    Medication/Dose: COPD exacerbation (H) [J44.1]  - Primary   ICD code (if different than what is on RX):    Previously Tried and Failed:    fluticasone-vilanterol (BREO ELLIPTA) 200-25 MCG/INH inhaler 3 Inhaler 3         Important Lab Values:   Rationale:     Insurance Name: [id number] Ph: [phone] /1437-[facility] Ph: [phone]   Insurance ID: [id number]   Insurance Phone Number:     Pharmacy Information (if different than what is on RX)  Name:    Phone:

## 2021-01-27 ENCOUNTER — OFFICE VISIT (OUTPATIENT)
Dept: FAMILY MEDICINE | Facility: CLINIC | Age: 76
End: 2021-01-27
Payer: MEDICARE

## 2021-01-27 VITALS
SYSTOLIC BLOOD PRESSURE: 122 MMHG | RESPIRATION RATE: 16 BRPM | HEART RATE: 68 BPM | TEMPERATURE: 99 F | OXYGEN SATURATION: 98 % | DIASTOLIC BLOOD PRESSURE: 64 MMHG

## 2021-01-27 DIAGNOSIS — R53.83 FATIGUE, UNSPECIFIED TYPE: ICD-10-CM

## 2021-01-27 DIAGNOSIS — J44.9 CHRONIC OBSTRUCTIVE PULMONARY DISEASE, UNSPECIFIED COPD TYPE (H): Primary | ICD-10-CM

## 2021-01-27 LAB
% GRANULOCYTES: 69.2 %G (ref 40–75)
ALBUMIN SERPL-MCNC: 4.2 MG/DL (ref 3.3–4.9)
ALP SERPL-CCNC: 62.2 U/L (ref 40–150)
ALT SERPL-CCNC: 22.1 U/L (ref 0–45)
AST SERPL-CCNC: 18.7 U/L (ref 0–55)
BILIRUB SERPL-MCNC: 0.6 MG/DL (ref 0.2–1.3)
BUN SERPL-MCNC: 23.9 MG/DL (ref 7–21)
CALCIUM SERPL-MCNC: 10 MG/DL (ref 8.5–10.1)
CHLORIDE SERPLBLD-SCNC: 105.4 MMOL/L (ref 98–110)
CHOLEST SERPL-MCNC: 184 MG/DL (ref 0–200)
CHOLEST/HDLC SERPL: 4.6 {RATIO} (ref 0–5)
CO2 SERPL-SCNC: 26.8 MMOL/L (ref 20–32)
CREAT SERPL-MCNC: 1.4 MG/DL (ref 0.7–1.3)
GFR SERPL CREATININE-BSD FRML MDRD: 51.8 ML/MIN/1.7 M2
GLUCOSE SERPL-MCNC: 105.4 MG'DL (ref 70–99)
GRANULOCYTES #: 4.3 K/UL (ref 1.6–8.3)
HBA1C MFR BLD: 5.7 % (ref 4.1–5.7)
HCT VFR BLD AUTO: 47.4 % (ref 40–53)
HDLC SERPL-MCNC: 40.1 MG/DL
HEMOGLOBIN: 14.3 G/DL (ref 13.3–17.7)
LDLC SERPL CALC-MCNC: 113 MG/DL (ref 0–129)
LYMPHOCYTES # BLD AUTO: 1.3 K/UL (ref 0.8–5.3)
LYMPHOCYTES NFR BLD AUTO: 21.1 %L (ref 20–48)
MCH RBC QN AUTO: 28.8 PG (ref 26.5–35)
MCHC RBC AUTO-ENTMCNC: 30.2 G/DL (ref 32–36)
MCV RBC AUTO: 95.6 FL (ref 78–100)
MID #: 0.6 K/UL (ref 0–2.2)
MID %: 9.7 %M (ref 0–20)
PLATELET # BLD AUTO: 192 K/UL (ref 150–450)
POTASSIUM SERPL-SCNC: 3.8 MMOL/L (ref 3.2–4.6)
PROT SERPL-MCNC: 6.8 G/DL (ref 6.8–8.8)
RBC # BLD AUTO: 5 M/UL (ref 4.4–5.9)
SODIUM SERPL-SCNC: 140.8 MMOL/L (ref 132–142)
TRIGL SERPL-MCNC: 153.4 MG/DL (ref 0–150)
TSH SERPL DL<=0.05 MIU/L-ACNC: 0.65 UIU/ML (ref 0.3–5)
VLDL CHOLESTEROL: 30.7 MG/DL (ref 7–32)
WBC # BLD AUTO: 6.2 K/UL (ref 4–11)

## 2021-01-27 PROCEDURE — 85025 COMPLETE CBC W/AUTO DIFF WBC: CPT | Performed by: STUDENT IN AN ORGANIZED HEALTH CARE EDUCATION/TRAINING PROGRAM

## 2021-01-27 PROCEDURE — 83036 HEMOGLOBIN GLYCOSYLATED A1C: CPT | Performed by: STUDENT IN AN ORGANIZED HEALTH CARE EDUCATION/TRAINING PROGRAM

## 2021-01-27 PROCEDURE — 80061 LIPID PANEL: CPT | Performed by: STUDENT IN AN ORGANIZED HEALTH CARE EDUCATION/TRAINING PROGRAM

## 2021-01-27 PROCEDURE — 80053 COMPREHEN METABOLIC PANEL: CPT | Performed by: STUDENT IN AN ORGANIZED HEALTH CARE EDUCATION/TRAINING PROGRAM

## 2021-01-27 PROCEDURE — 99214 OFFICE O/P EST MOD 30 MIN: CPT | Mod: GC | Performed by: STUDENT IN AN ORGANIZED HEALTH CARE EDUCATION/TRAINING PROGRAM

## 2021-01-27 NOTE — PROGRESS NOTES
ASSESSMENT AND PLAN     1. Chronic obstructive pulmonary disease, unspecified COPD type (H)  Recently seen on 1/21/2021 for a COPD exacerbation.  At that time, he was having increased production of green sputum despite a course of doxycycline.  He reports that this is now improved and has some residual bilateral lower rib pain when he coughs.  Vital signs stable today including 98% oxygen saturation.  Lungs had clear symmetrical sounds with no and expiratory wheezing noted.  Given improvement in his symptoms, decided not to get a chest x-ray considering his stability of illness.  He was a bit confused over his inhaler usage.  Has been taking albuterol inhaler once a day but is out of his Breo Ellipta for COPD.  Discussed that we will represcribe this today and he should start doing 1 puff daily to help aid his lung function.  - fluticasone-vilanterol (BREO ELLIPTA) 200-25 MCG/INH inhaler; Inhale 1 puff into the lungs daily  Dispense: 3 Inhaler; Refill: 3    2. Fatigue, unspecified type  Patient with a history of CAD and COPD.  Recently had his Covid vaccine, around 1, yesterday.  Has a lot of risk factors.  He has been feeling increased fatigue over the past several weeks especially when he walks and talks.  He feels that this is not worsened but has not improved.  Discussed that given his history we should work-up with a few laboratory tests including lipid panel, CMP, CBC, thyroid and A1c.  Discussed that he should follow-up with Dr. Pastor to discuss these results.  Discussed that if he develops new or worsening symptoms, or chest pain, shortness of breath, numbness, tingling, weakness he should seek emergent medical attention.  He understands and agrees with plan.  - CBC with Diff Plt ([facility])  - Comprehensive Metabolic Panel ([place])  - Hemoglobin A1c ([facility])  - Lipid Panel ([facility]) - Results < 1 hr  - Thyroid [place] ([facility])        RTC in 1 month for follow up of fatigue and COPD or sooner if  develops new or worsening symptoms.  Should also continue discussion of obtaining a low-dose CT scan for screening of lung cancer given his age and risk factors of quitting smoking less than 5 years ago.  Discussed with patient today which he declined at this time.    The patient was seen by, and discussed with, Dr. Alba Angelo, who agrees with the plan.    Alexandrea Pérez MD PGY3  [facility] Medicine         SUBJECTIVE       Srikanth Graves is a 75 year old  male with a PMH significant for:     Patient Active Problem List   Diagnosis     Other constipation     Advance care planning     Bunion of great toe     Degeneration of cervical intervertebral disc     Chronic airway obstruction (H)     Chronic low back pain     Chronic pain disorder     Chronic obstructive pulmonary disease (H)     Coronary artery disease of native artery of native heart with stable angina pectoris (H)     Depression     DJD (degenerative joint disease), ankle and foot     Dyspnea on exertion     Edema     Esophageal reflux     Essential hypertension     Headache disorder     Peripheral vascular disease (H)     Primary open angle glaucoma of right eye, mild stage     Vitamin D deficiency     Cocaine abuse in remission (H)     Coccydynia     Clavus     Eye globe prosthesis     Hallux valgus, acquired     Neck pain     Routine adult health maintenance     Controlled substance agreement terminated     Testicular cyst     Intermittent chest pain     Mild episode of recurrent major depressive disorder (H)     Notalgia paresthetica     NS (nuclear sclerosis), right     Opioid type dependence (H)     Other syndromes affecting cervical region     Personal history of nicotine dependence     Sacroiliitis, not elsewhere classified (H)     Serum creatinine raised     Tobacco use disorder     Tubular adenoma of colon     CKD (chronic kidney disease) stage 3, GFR 30-59 ml/min     Blindness of left eye     He presents for follow-up of his COPD.    On  1/21/21 was seen for a continued cough productive of green sputum despite course of doxycycline.\ Was prescribed albuterol and levaquin 500mg daily for 7 days. Negative covid 1/8/21.  Reports that he has had decreased sputum production, still has a chronic cough.  When he does have sputum production it is gray to green in nature.  He reports that he has had increased fatigue after he walks or does daily activities.  Denies chest pain and difficulty breathing.  Does endorse some lower chest wall soreness that is tender to palpation in his lower ribs.  Denies fever/chills, nausea/vomiting, abdominal pain, leg swelling, numbness/tingling/weakness.  Has not been taking his COPD controller because he lost it.  Has 2 to 3 days left of his Levaquin antibiotic.  History of smoking, quit 5 years ago.  Has not had a low-dose CT scan for lung cancer screening yet.    PMH, Medications and Allergies were reviewed and updated as needed.        REVIEW OF SYSTEMS     ROS reviewed in HPI.         OBJECTIVE     Vitals:    01/27/21 1555   BP: 122/64   Pulse: 68   Resp: 16   Temp: 99  F (37.2  C)   SpO2: 98%     There is no height or weight on file to calculate BMI.    General: Alert, well appearing, no acute distress  Eyes: PERRL, entropia of eyes bilaterally  HENT: Normocephalic, atraumatic; moist mucous membranes, no oropharyngeal erythema, poor dentition  Lungs: Clear to auscultation bilaterally, no wheezing, no rhonchi, no crackles, no end expiratory wheezing.  CV: Regular rate and rhythm, no murmur, no rubs, no gallops  Abdomen: Soft, nontender, non-distended, no masses palpated, normal bowel sounds  Extremities: Able to move all extremities, nontender, normal strength, no swelling  Skin: Dry, no cyanosis, no abrasions, no discoloration.  Occasional tattoos.    No results found for this or any previous visit (from the past 24 hour(s)).

## 2021-01-27 NOTE — TELEPHONE ENCOUNTER
Central Prior Authorization Team   Phone: [phone]      PA Initiation    Medication: Incruse Ellipta  Insurance Company: [employer] - Phone [phone] Fax [phone]  Pharmacy Filling the Rx: [id number] - [place], [place] - [address] [place] [address] [facility]  Filling [facility]acy Phone: [phone]  Filling Pharmacy Fax:    Start Date: 1/27/2021

## 2021-01-27 NOTE — PATIENT INSTRUCTIONS
1. Chronic obstructive pulmonary disease, unspecified COPD type (H)  - fluticasone-vilanterol (BREO ELLIPTA) 200-25 MCG/INH inhaler; Inhale 1 puff into the lungs daily  Dispense: 3 Inhaler; Refill: 3    2. Fatigue, unspecified type  - CBC with Diff Plt ([facility])  - Comprehensive Metabolic Panel ([place])  - Hemoglobin A1c ([facility])  - Lipid Panel ([facility]) - Results < 1 hr  - Thyroid [place] ([facility])    Take care! Follow up with Dr. Coelho within one month unless you feel new or worsening symptoms.    Thanks,  Dr. Pérez

## 2021-01-27 NOTE — TELEPHONE ENCOUNTER
Prior Authorization Approval    Authorization Effective Date: 1/1/2021  Authorization Expiration Date: 12/31/2021  Medication: Incruse Ellipta-APPROVED  Approved Dose/Quantity:   Reference #:     Insurance Company: [employer] - Phone [phone] Fax [phone]  Expected CoPay:       CoPay Card Available:      Foundation Assistance Needed:    Which Pharmacy is filling the prescription (Not needed for infusion/clinic administered): [id number] - [place], [place] - [address] [place] [address] [address]  Pharmacy Notified: Yes  Patient Notified: No    Pharmacy will notify patient when medication is ready.

## 2021-02-03 ENCOUNTER — TELEPHONE (OUTPATIENT)
Dept: FAMILY MEDICINE | Facility: CLINIC | Age: 76
End: 2021-02-03
Payer: MEDICARE

## 2021-02-03 DIAGNOSIS — J44.1 COPD EXACERBATION (H): ICD-10-CM

## 2021-02-03 RX ORDER — DOXYCYCLINE 100 MG/1
100 CAPSULE ORAL 2 TIMES DAILY
Qty: 14 CAPSULE | Refills: 0 | Status: CANCELLED | OUTPATIENT
Start: 2021-02-03

## 2021-02-16 DIAGNOSIS — J44.1 COPD EXACERBATION (H): ICD-10-CM

## 2021-02-16 RX ORDER — ALBUTEROL SULFATE 90 UG/1
AEROSOL, METERED RESPIRATORY (INHALATION)
Qty: 18 INHALER | Refills: 0 | Status: SHIPPED | OUTPATIENT
Start: 2021-02-16 | End: 2021-03-09

## 2021-02-17 ENCOUNTER — TELEPHONE (OUTPATIENT)
Dept: FAMILY MEDICINE | Facility: CLINIC | Age: 76
End: 2021-02-17

## 2021-03-08 DIAGNOSIS — J44.1 COPD EXACERBATION (H): ICD-10-CM

## 2021-03-09 RX ORDER — ALBUTEROL SULFATE 90 UG/1
AEROSOL, METERED RESPIRATORY (INHALATION)
Qty: 18 INHALER | Refills: 0 | Status: SHIPPED | OUTPATIENT
Start: 2021-03-09 | End: 2021-05-21

## 2021-03-15 ENCOUNTER — DOCUMENTATION ONLY (OUTPATIENT)
Dept: FAMILY MEDICINE | Facility: CLINIC | Age: 76
End: 2021-03-15

## 2021-03-30 ENCOUNTER — OFFICE VISIT (OUTPATIENT)
Dept: FAMILY MEDICINE | Facility: CLINIC | Age: 76
End: 2021-03-30
Payer: MEDICARE

## 2021-03-30 VITALS — SYSTOLIC BLOOD PRESSURE: 146 MMHG | DIASTOLIC BLOOD PRESSURE: 79 MMHG

## 2021-03-30 DIAGNOSIS — F51.01 PRIMARY INSOMNIA: Primary | ICD-10-CM

## 2021-03-30 DIAGNOSIS — B35.3 TINEA PEDIS OF LEFT FOOT: ICD-10-CM

## 2021-03-30 DIAGNOSIS — M79.672 LEFT FOOT PAIN: ICD-10-CM

## 2021-03-30 DIAGNOSIS — K59.09 OTHER CONSTIPATION: ICD-10-CM

## 2021-03-30 DIAGNOSIS — I25.118 CORONARY ARTERY DISEASE OF NATIVE ARTERY OF NATIVE HEART WITH STABLE ANGINA PECTORIS (H): ICD-10-CM

## 2021-03-30 PROCEDURE — 99214 OFFICE O/P EST MOD 30 MIN: CPT | Performed by: FAMILY MEDICINE

## 2021-03-30 RX ORDER — LANOLIN ALCOHOL/MO/W.PET/CERES
3 CREAM (GRAM) TOPICAL
Qty: 30 TABLET | Refills: 1 | Status: SHIPPED | OUTPATIENT
Start: 2021-03-30 | End: 2021-04-22

## 2021-03-30 RX ORDER — PRENATAL VIT 91/IRON/FOLIC/DHA 28-975-200
COMBINATION PACKAGE (EA) ORAL 2 TIMES DAILY
Qty: 15 G | Refills: 1 | Status: SHIPPED | OUTPATIENT
Start: 2021-03-30

## 2021-03-30 RX ORDER — CYCLOBENZAPRINE HCL 10 MG
10 TABLET ORAL
Qty: 30 TABLET | Refills: 3 | Status: SHIPPED | OUTPATIENT
Start: 2021-03-30

## 2021-03-30 RX ORDER — DOCUSATE SODIUM 100 MG/1
100 CAPSULE, LIQUID FILLED ORAL 3 TIMES DAILY PRN
Qty: 90 CAPSULE | Refills: 3 | Status: SHIPPED | OUTPATIENT
Start: 2021-03-30

## 2021-03-30 NOTE — PROGRESS NOTES
S: Srikanth Graves is a 75 year old male who returns for follow up of  Follow-up chronic medical care, med refills and documentation for low salt and fat diet   Ha follow-up  with cariology/ha recent angiogram   Also has 3 to  cm infrarenal AAA  Patients states that main concern today is  meds refills and meds for sleep    PMHX/PSHX/MEDS/ALLERGIES/SHX/FHX reviewed and updated in [facility].  Specialty care  CAD: Impression and recommendation:  Severe three-vessel native coronary artery disease with previously known occlusion of the proximal RCA and left main  4/4 bypass grafts remain patent (LIMA to distal LAD, saphenous vein graft to first diagonal, and sequenced saphenous vein graft to RPDA and RPLA1)  Previously placed stent in the body of the right sided saphenous vein graft remains widely patent  Left ventricular end-diastolic pressure is elevated  There is no interval development of severe epicardial stenosis to explain the patient's reported worsening stable angina  He is noted to have severe small vessel disease, which could be exacerbated in the setting of elevated left ventricular end-diastolic pressure  Findings discussed with the patient and his primary cardiologist, Dr. Hugo   Follow-up with cardiology   AAA: 3 to 4 cm infrarenal     ROS:  General: No fevers, chills  Head: No headache  Ears: No acute change in hearing.    CV: No chest pain or palpitations.  Resp: No shortness of breath.  No cough. No hemoptysis.  GI: No nausea, vomiting, constipation, diarrhea  : No urinary pains    O: BP (!) 146/79 (BP Location: Left arm, Patient Position: Sitting)    Gen:  Well nourished and in NAD    Neck: supple without lymphadenopathy  CV:  RRR  - no murmurs, rubs, or gallups,   Pulm:  CTAB, no wheezes/rales/rhonchi, good air entry   ABD: soft, nontender, no masses, no rebound, BS intact throughout  Extrem: no cyanosis, edema or clubbing  Psych: Euthymic      (F51.01) Primary insomnia  (primary encounter  diagnosis)    Plan: melatonin 3 MG tablet            Should not be on Trazodone    (M79.672) Left foot pain  Comment:  Hallus valgus/ podiatry has recommended surgery but will have difficult recovery    Back pain/chronic ,mechanical  Plan: cyclobenzaprine (FLEXERIL) 10 MG tablet at night time         (K59.09) Other constipation  Comment:  Plan: docusate sodium (COLACE) 100 MG capsule         Fair results but will increase med to TID    (B35.3) Tinea pedis of left foot  Comment:  Much better  Plan: terbinafine (LAMISIL) 1 % external cream       CVAD/vasculopathy: low salt and fat diets      RTC 1 to 3 months coordination of care or sooner if develops new or worsening symptoms.    Rohit Pastor MD

## 2021-03-30 NOTE — NURSING NOTE
Medicare Wellness Visit  Health Risk Assessment                FALL RISK ASSESSMENT 8/24/2020 5/29/2019 5/3/2019 4/17/2018 3/19/2018 3/7/2018   Fallen 2 or more times in the past year? No No (No Data) No No No   Any fall with injury in the past year? No No Yes No No No            Health Risk Assessment / Review of Systems     Constitutional: Any fevers or night sweats? no    Eyes:  Vision problems    YES Patient sees and optamalogist    Hearing Do you feel you have hearing loss?   YES     Cardiovascular: Any chest pain, fast or irregular heart beat, calf pain with walking?      YES           Respiratory:   Any breathing problems or cough?    YES patient has COPD    Gastrointestinal: Any stomach or stool problems?   No      Genitourinary: Do you have difficulty controlling urination?   No     Muscles and Joints: Any joint stiffness or soreness?   yes    Skin: Any concerning lesions or moles?    YES Patient complains of lesions all over the body    Nervous System: Any loss of strength or feeling, numbness or tingling, shaking, dizziness, or headache?   YES Patient complains of loss of strength due to age.    Mental Health: Any depression, anxiety or problems sleeping?    No     Cognition: Do you have any problems with your memory?  No     PHQ-2 Score:   PHQ-2 ( 1999 [employer]izer) 1/21/2021 1/8/2021   Q1: Little interest or pleasure in doing things 0 0   Q2: Feeling down, depressed or hopeless 0 0   PHQ-2 Score 0 0       PHQ-9 Score:   [facility] Follow-up to PHQ 10/11/2019 10/24/2019 7/7/2020   PHQ-9 9. Suicide Ideation past 2 weeks Not at all Not at all Not at all            Medical Care     What other specialists or organizations are involved in your medical care?    [facility] Dr. Hugo  [facility]  [facility] Dr. Rossi    Patient Care Team       Relationship Specialty Notifications Start End    Rohit Pastor MD PCP - General Family Medicine  12/8/20     Phone: [phone] Fax:  [phone] [address] [facility] [address]    Nemo Fung [facility] Coord[facility]  All results, Admissions 18     [facility]:  [phone]    Anni Gaming    19     [facility]    Phone: [phone]         Rohit Pastor MD Assigned PCP   20     Phone: [phone] Fax: [phone] [address] [facility] [address]                 Social History / Home Safety     Social History     Tobacco Use     Smoking status: Former Smoker     Packs/day: 1.00     Types: Cigarettes     Quit date: 2016     Years since quittin.3     Smokeless tobacco: Never Used   Substance Use Topics     Alcohol use: No     Marital Status:  Who lives in your household? Alone    Does your home have any of the following safety concerns? Loose rugs in the hallway, no grab bars in the bathroom, no handrails on the stairs or have poorly lit areas?  No     Do you feel threatened or controlled by a partner, ex-partner or anyone in your life? No     Has anyone hurt you physically, for example by pushing, hitting, slapping or kicking you   or forcing you to have sex? No          Functional Status     Do you need help with dressing yourself, bathing, or walking?No     Do you need help with the phone, transportation, shopping, preparing meals, housework, laundry, medications or managing money?No       Risk Behaviors and Healthy Habits     History   Smoking Status     Former Smoker     Packs/day: 1.00     Types: Cigarettes     Quit date: 2016   Smokeless Tobacco     Never Used     How many servings of fruits and vegetables do you eat a day? 1 serving a day    Exercise: 6-7 days/week for an average of 15-30 minutes walking  and strength training     Do you frequently drive without a seatbelt? No     Do you use any other drugs? No         Do you use alcohol? no        Frailty Assessment            1. By yourself and note using aids, do you have difficulty  "walking up 10 steps without resting?  No  (1 for Yes, 0 for No)    2. By yourself and not using mobility aids, do you have any difficulty walking several hundred yards? No  (1 for Yes, 0 for No)    3. Have you lost 10 or more pounds unintentionally in the previous year? No  (If \"Yes\" and >5% weight loss, then score 1.  Score 0, if <5% weight loss or \"No\" weight loss)    4. How much of the times during the past 3 weeks did you feel tired? 3. Some of the time (\"1\" or \"2\" are scored 1, others 0)    5.  A doctor told the patient they had the following illnesses:  High blood pressure  Arthritis (0-4 = score 0, 5-11= score 1)        Darwin Gamble    "

## 2021-03-30 NOTE — PATIENT INSTRUCTIONS
Seen today   meds refilled   Has follow-up at [facility] and cardiology   Needs low salt and low fat diet/has CAD and HTN  Shingle vaccination

## 2021-04-07 NOTE — PROGRESS NOTES
Telephone call to the client's home for annual health risk assessment due to COVID-19.  An  was not needed.  Included in the assessment was Srikanth's CADI Worker, Anni Gaming vis telephone.    Current situation/living environment  Client lives in a apartment complex that he moved to last year.  He is upset with the complex because they only allow one internet provider into the building.  He is not able to use that provider due to past complications.  At this time he is not able to watch tv.  He is requesting a new place to live.  Anni will make a referral to the housing stabilization program.  Since his one year is up in April he is aware that he had to sign another lease and will need to stay for another year.  He is aware and in agreement.  Client manages his own finances and MA paperwork.  Client drives his own vehicle.     Activities of daily living (ADL)/instrumental activities of daily living (IADL) and functional issues  Client needs help with the following ADL's: dressing, grooming and bathing  Client needs help with the following IADL's: cooking, housekeeping and laundry  Client states he is unable to perform the above due to pain.    Client reports he has had other medical concerns and has not yet been able to see the dentist regarding teeth extraction. He still plans to have this done.  CADI worker is going to make a referral for [employer].  His cardiologist felt it would be best for him to have this device.  He also gets MOM's meals.        Health concerns for today    Client reports that he has been seeing his cardiologist and having testing done due to complaint of SOB.  Cardiologist is located at [facility].  He reports having COVID vaccination.  He had a audiology appt and was told he qualified for hearing aids but no one called him to schedule.  He would like CC to look into this. He manages his own medications.  Reminded he needed a wellness visit.  Up to date on  immunizations.         Has patient fallen 2 or more times in the last year? No  Has patient fallen with injury in the last year? No    Cognition/mental health  Client is alert and oriented.  He has a dx of depression but expressed no concerns at this time.      STARS/Med Adherence  Client is compliant with the following quality measures:     Client's Plan of Care consists of:  Client will continue to receive PCA and homemaking hours and MOM's meals.  Referrals will be made to [facility] and housing stabilization program by CADI worker.  CC to look into hearing aid fitting.

## 2021-04-12 ENCOUNTER — OFFICE VISIT - HEALTHEAST (OUTPATIENT)
Dept: AUDIOLOGY | Facility: CLINIC | Age: 76
End: 2021-04-12

## 2021-04-12 DIAGNOSIS — H90.3 SENSORINEURAL HEARING LOSS, BILATERAL: ICD-10-CM

## 2021-04-22 DIAGNOSIS — F51.01 PRIMARY INSOMNIA: ICD-10-CM

## 2021-04-22 RX ORDER — OMEPRAZOLE MAGNESIUM 20 MG
CAPSULE,DELAYED RELEASE (ENTERIC COATED) ORAL
Qty: 30 TABLET | Refills: 1 | Status: SHIPPED | OUTPATIENT
Start: 2021-04-22 | End: 2021-05-21

## 2021-05-18 DIAGNOSIS — J44.1 COPD EXACERBATION (H): ICD-10-CM

## 2021-05-18 DIAGNOSIS — F51.01 PRIMARY INSOMNIA: ICD-10-CM

## 2021-05-21 RX ORDER — ALBUTEROL SULFATE 90 UG/1
AEROSOL, METERED RESPIRATORY (INHALATION)
Qty: 12 G | Refills: 0 | Status: SHIPPED | OUTPATIENT
Start: 2021-05-21 | End: 2021-07-12

## 2021-05-21 RX ORDER — OMEPRAZOLE MAGNESIUM 20 MG
CAPSULE,DELAYED RELEASE (ENTERIC COATED) ORAL
Qty: 30 TABLET | Refills: 1 | Status: SHIPPED | OUTPATIENT
Start: 2021-05-21 | End: 2021-06-22

## 2021-05-26 ENCOUNTER — RECORDS - HEALTHEAST (OUTPATIENT)
Dept: ADMINISTRATIVE | Facility: CLINIC | Age: 76
End: 2021-05-26

## 2021-05-28 ENCOUNTER — RECORDS - HEALTHEAST (OUTPATIENT)
Dept: ADMINISTRATIVE | Facility: CLINIC | Age: 76
End: 2021-05-28

## 2021-05-29 ENCOUNTER — RECORDS - HEALTHEAST (OUTPATIENT)
Dept: ADMINISTRATIVE | Facility: CLINIC | Age: 76
End: 2021-05-29

## 2021-05-30 ENCOUNTER — RECORDS - HEALTHEAST (OUTPATIENT)
Dept: ADMINISTRATIVE | Facility: CLINIC | Age: 76
End: 2021-05-30

## 2021-05-31 ENCOUNTER — RECORDS - HEALTHEAST (OUTPATIENT)
Dept: ADMINISTRATIVE | Facility: CLINIC | Age: 76
End: 2021-05-31

## 2021-06-02 ENCOUNTER — RECORDS - HEALTHEAST (OUTPATIENT)
Dept: ADMINISTRATIVE | Facility: CLINIC | Age: 76
End: 2021-06-02

## 2021-06-04 ENCOUNTER — RECORDS - HEALTHEAST (OUTPATIENT)
Dept: ADMINISTRATIVE | Facility: CLINIC | Age: 76
End: 2021-06-04

## 2021-06-04 NOTE — PROGRESS NOTES
[facility] Daily Progress     Patient Name: Srikanth Graves  Date: 12/24/2019 (end date 3/6/19 per POC)   Visit #: 4/12  PTA visit #: 1   Referral Diagnosis: Chronic bilateral low back pain with bilateral sciatica  Right-sided chest pain [R07.9]       Neck pain on left side [M54.2]       Right hip pain [M25.551]       Cervicalgia [M54.2]       Referring provider: Roz Burnham MD ** (outside order)   Visit Diagnosis:     ICD-10-CM    1. Right-sided chest pain R07.9    2. Neck pain on left side M54.2    3. Chronic pain syndrome G89.4    4. Right hip pain M25.551    5. Poor posture R29.3    6. Muscle weakness (generalized) M62.81      Bend/twist/stretch overhead per patient reports per MD, No bend, twist/stretch arms overhead, cerv fusion x2 10-15 years ago, chronic pain syndrome, coronary by-pass 4-5 mos ago - per evaluation on 6/5/18   Precautions: Degeneration of cervical intervertebral disc     Chronic airway obstruction (H)     Chronic low back pain     Chronic pain disorder     Chronic obstructive pulmonary disease (H)     Coronary artery disease of native artery of native heart with stable angina pectoris (H)     Depression     DJD (degenerative joint disease), ankle and foot     Dyspnea on exertion     Edema     Esophageal reflux     Essential hypertension     Headache disorder     Peripheral vascular disease (H)     Primary open angle glaucoma of right eye, mild stage     Vitamin D deficiency     Cocaine abuse in remission (H)     Coccydynia     Clavus     Eye globe prosthesis     Hallux valgus, acquired     Neck pain     Routine adult health maintenance     Controlled substance agreement terminated     Testicular cyst     Intermittent chest pain     Mild episode of recurrent major depressive disorder (H)     Notalgia paresthetica     NS (nuclear sclerosis), right     Opioid type dependence (H)     Other syndromes affecting cervical region     Personal history of nicotine dependence      Sacroiliitis, not elsewhere classified (H)     Serum creatinine raised     Tobacco use disorder     Unspecified glaucoma(365.9)     Assessment:   Pt c/o pain all over the body  Exercises 2x/day  Sleeping good      Patient demonstrates understanding/independence with home program.  Patient is benefitting from skilled physical therapy and is making steady progress toward functional goals.  Patient is appropriate to continue with skilled physical therapy intervention, as indicated by initial plan of care.    Goal Status: Ongoing  Pt. will be independent with home exercise program in : 6 weeks  Pt. will report decreased intensity, frequency of : Pain;in 12 weeks    Pt will: stand for >10 min with less pain in legs in 12 weeks.   Pt will: walk >1/2 block with increased ease and pain in 12 weeks.       Plan / Patient Education:     Continue with initial plan of care.  Progress with home program as tolerated.     Plan for next week: stationary bike - seat #3, resistance 3-4   Review HEP and progress   STM to right low back/hip/cervical     Subjective:   7/10 pain all over   Exercises 2x/day  TENS occasionally    Medications for gout was making me sick so threw it away    Objective:     Denies chest pain today  Restorator 4 mins   Continued MT per flow sheet  Reports relief with PT treatment  Exercises:  Exercise #1: LTR x 10  Comment #1: SKTC x30 seconds   Exercise #2: bridge  10x  Comment #2: reach and roll x5 than hold 30 seconds   Exercise #3: piriformis stretch hold 30 seconds   Comment #3: NG: sciatic nerve in supine   Exercise #4: pec stretch in corner instructed 5x          Treatment Today     TREATMENT MINUTES COMMENTS   Evaluation     Self-care/ Home management     Manual therapy 10 Prone: STM to Right gluts, SI joint, lumbar spinal erectors (R>L)    Neuromuscular Re-education     Therapeutic Activity     Therapeutic Exercises 17 See ex's flow sheet above   .    Gait training     Modality__________________                 Total 27    Blank areas are intentional and mean the treatment did not include these items.       Vince Armenta, PT, DPT   12/24/2019
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
## 2021-06-13 NOTE — PROGRESS NOTES
CHIEF COMPLAINT: Hearing Loss      HISTORY OF PRESENT ILLNESS    KAELYN was seen at the behest of Rohit Pastor for Hearing loss.  Patient has been experiencing hearing loss for the past 5 to 6 years.  His family has noticed this and have told him that the TV this is too loud any speaks too loudly.  He does not have any tinnitus or dizziness.  No other ear nose or throat related concerns except for some itching in both ears.  No history of otologic surgery or otologic disease.  No otorrhea or ear pain.         REVIEW OF SYSTEMS    Review of Systems: a 10-system review is reviewed at this encounter.  See scanned document.     Nitroglycerin and Other drug allergy (see comments)       There were no vitals filed for this visit.    Social, Family, and Med/Surg Hx reviewed in Epic    0.25 PPD smoker      PHYSICAL EXAM:        HEAD: Normal appearance and symmetry:  No cutaneous lesions.      EARS:    Normal TM's bilaterally. Normal auditory canals and external ears. Non-tender.         NOSE:    Dorsum:   straight  Septum: normal  Mucosa:  moist  Inferior turbinates:  normal       ORAL CAVITY/OROPHARYNX:    Lips:  Normal.  Tongue: normal, midline  Mucosa:   no lesions  Tonsils:  1+      NECK:  Trachea:  midline.   Thyroid:  normal   Adenopathy:  none       NEURO:   Alert and Oriented    GAIT AND STATION:  normal     RESPIRATORY:   Symmetry and Respiratory effort    PSYCH:   normal mood and affect    SKIN:  warm and dry         IMPRESSION:    Encounter Diagnoses   Name Primary?     Hearing difficulty, unspecified laterality Yes     Ear itching      Sensorineural hearing loss (SNHL) of both ears      No orders of the defined types were placed in this encounter.       RECOMMENDATIONS:    Recommend Lidex cream 4 to be used daily as needed for itching.  He is instructed to use this for at least 3 to 4 days initially on every day and then 2-3 times weekly at bedtime for the itching.  Recommend he return annually for hearing test.   He is already set up for hearing aid evaluation.  He understands that we dispense hearing aids here but there are other dispensers  in town where he can also purchase hearing aids.  He understands that he has 45 days by law to return hearing aids if heshould he decide not to purchase them.  All questions were answered he is agreeable to this plan of care.

## 2021-06-14 ENCOUNTER — OFFICE VISIT - HEALTHEAST (OUTPATIENT)
Dept: AUDIOLOGY | Facility: CLINIC | Age: 76
End: 2021-06-14

## 2021-06-14 DIAGNOSIS — H90.3 SENSORINEURAL HEARING LOSS, BILATERAL: ICD-10-CM

## 2021-06-17 NOTE — PATIENT INSTRUCTIONS - HE
Patient Instructions by Eva Delacruz PT at 12/20/2019  2:00 PM     Author: Eva Delacruz PT Service: -- Author Type: Physical Therapist    Filed: 12/20/2019  2:10 PM Encounter Date: 12/20/2019 Status: Signed    : Eva Delacruz PT (Physical Therapist)        Pec stretch in doorway    Find a comfortable position for your hands and lightly lean forward into the corner until you feel a good stretch in your pecs. Hold 20-30 sec, x2-3 reps

## 2021-06-17 NOTE — PATIENT INSTRUCTIONS - HE
Patient Instructions by Eva Delacruz PT at 1/2/2020 12:00 PM     Author: Eva Delacruz PT Service: -- Author Type: Physical Therapist    Filed: 1/2/2020 12:23 PM Encounter Date: 1/2/2020 Status: Signed    : Eva Delacruz PT (Physical Therapist)          Toe/Heel Raises    Gently rise up on toes and roll back on heels.    Perform       10 Reps          Marching    Holding onto a heavy chair or counter, alternately lift knees, taking high steps.    Perform      20 Reps           Side Leg Kicks    Kick leg out to the side and slowly bring back to center.    Perform     10  Reps          Back Leg Kicks    Kick leg back as far as possible standing tall with your back upright.    Perform      10 Reps

## 2021-06-17 NOTE — PATIENT INSTRUCTIONS - HE
"Patient Instructions by Eva Delacruz PT at 12/13/2019 10:30 AM     Author: Eva Delacruz PT Service: -- Author Type: Physical Therapist    Filed: 12/13/2019 11:13 AM Encounter Date: 12/13/2019 Status: Signed    : Eva Delacruz PT (Physical Therapist)        LOWER TRUNK ROTATIONS - LTR    Lying on your back with your knees bent, gently move your knees side-to-side.      SINGLE KNEE TO CHEST STRETCH - SKTC    While Lying on your back,  hold your knee and gently pull it up towards your chest.    Alternate:        BRIDGING    While lying on your back, tighten your lower abdominals, squeeze your buttocks and then raise your buttocks off the floor/bed as creating a \"Bridge\" with your body.            "

## 2021-06-17 NOTE — PATIENT INSTRUCTIONS - HE
Patient Instructions by Eva Delacruz PT at 12/16/2019  2:30 PM     Author: Eva Delacruz PT Service: -- Author Type: Physical Therapist    Filed: 12/16/2019  2:53 PM Encounter Date: 12/16/2019 Status: Signed    : Eva Delacruz PT (Physical Therapist)        SIDELYING TRUNK ROTATION    While lying on your side with your arms out-stretched in front of your body, slowly twist your upper body to the side and rotated your spine. Your arms and head should also be rotating along with the spine as shown. Follow your hand with your eyes.             Lie on your back x10   - Grab behind your knee slightly pulling your knee to your chest (you can use a towel if needed)   - Straighten the leg as far as you can. Get a nice easy stretch. Do not hold   - Bring the leg down      Modified Piriformis Stretch    Lie on your back. Bend one of your knees up. Cross the opposite leg over so that your ankle is resting on your thigh. Push your knee away from your body and hold.    Hold 30 seconds

## 2021-06-18 NOTE — PATIENT INSTRUCTIONS - HE
Patient Instructions by Eva Delacruz PT at 2/18/2020  2:30 PM     Author: Eva Delacruz PT Service: -- Author Type: Physical Therapist    Filed: 2/18/2020  2:57 PM Encounter Date: 2/18/2020 Status: Signed    : Eva Delacruz PT (Physical Therapist)          Mini-squats    Holding a chair or counter with feet hip width apart slowly bend knees.  Keep your knees over your ankles, back straight and head up.    Perform       Reps     Times per day      Calf Stretches    With one foot in front, the other foot behind, lean forward, keeping back heel touching the ground. Hold 20-30 seconds.    Perform       Reps     Times per day           Hamstring Curl    Laying on your stomach or standing upright slowly bend your knee as you bring your foot towards your buttock.

## 2021-06-18 NOTE — PROGRESS NOTES
June 5, 2018    [facility]  [facility]  [address] [place] [id number]      Patient: Srikanth Graves   MR Number: [id number]   YOB: 1945   Date of Visit: 6/5/2018       Dear Dr. Rohit Patsor:    Thank you for this referral.   We are seeing Srikanth Graves for Physical Therapy of neck pain and headaches.    Medicare and/or Medicaid requires physician review and approval of the treatment plan. Please review the plan of care and verify that you agree with the therapy plan of care by co-signing this note.      Plan of Care  Authorization / Certification Start Date: 06/05/18  Authorization / Certification End Date: 08/29/18  Authorization / Certification Number of Visits: 12  Communication with: Referral Source  Patient Related Instruction: Nature of Condition;Treatment plan and rationale;Self Care instruction;Basis of treatment;Posture;Precautions;Next steps;Expected outcome  Times per Week: 1  Number of Weeks: 12  Number of Visits: 12  Discharge Planning: Independent HEP and self-management of symptoms  Precautions / Restrictions : No bend, twist/stretch arms overhead, cerv fusion x2 10-15 years ago, chronic pain syndrome, coronary by-pass 4-5 mos ago  Therapeutic Exercise: ROM;Strengthening  Neuromuscular Reeducation: kinesio tape;posture  Manual Therapy: soft tissue mobilization;myofascial release  Equipment: other  Equipment: kinesiotape    Goals  Pt. will demonstrate/verbalize independence in self-management of condition in : 12 weeks  Pt. will be independent with home exercise program in : 12 weeks  Pt. will report decreased intensity, frequency of : Headache;in 12 weeks;Comment  Comment:: decrease to Intermittent HA from constant daily HA  Pt. will have improved quality of sleep: with less pain;waking less times/night;in 12 weeks;Comment  Comment:: decreased episodes of sharp pain and decrease interruptions to 1-2x/night  Patient Turn Head: for  conversation;for driving;for watching traffic;with less pain;with less difficulty;in 12 weeks  Patient will look up / down: for reading;with less pain;with less difficulty;in 12 weeks  Patient will decrease : NDI score;by _ points;for improved quality of function;for improved quality of life;in 6 weeks  by ___ points: 5        If you have any questions or concerns, please don't hesitate to call.    Sincerely,    Jennifer Miller, PT      Dr. Rohit Pastor  [place] [phone]          Physician recommendation:     ___ Follow therapist's recommendation        ___ Modify therapy      I certify the need for these services furnished within this plan and while under my care.    Referring Provider's  Printed Name:   _____________________________________________    Referring Provider's signature:  __________________________________________________  Date/time:    ________________        After signing this form, please return to the fax number in the header.  Thank you.      [facility]   Cervical Thoracic Initial Evaluation    Patient Name: Srikanth Graves  PRECAUTION/RESTRICIONS:  Bend/twist/stretch overhead per patient reports per MD, No bend, twist/stretch arms overhead, cerv fusion x2 10-15 years ago, chronic pain syndrome, coronary by-pass 4-5 mos ago  Date of evaluation: 6/5/2018 (med cert:  6/5-8/29/18x12)  Referral Diagnosis: Neck Pain  Referring provider: Rohit Pastor MD  Visit Diagnosis:     ICD-10-CM    1. Cervicalgia M54.2    2. Headache R51    3. Poor posture R29.3    4. Weakness of neck M53.82        Assessment:      Skilled PT is required to modulate pain and headaches, increase ROM , strength and posture through manual therapy exercise and education  Pt. is appropriate for skilled PT intervention as outlined in the Plan of Care (POC).  Pt. is a good candidate for skilled PT services to improve pain levels and function.    Goals:  Pt. will  demonstrate/verbalize independence in self-management of condition in : 12 weeks  Pt. will be independent with home exercise program in : 12 weeks  Pt. will report decreased intensity, frequency of : Headache;in 12 weeks;Comment  Comment:: decrease to Intermittent HA from constant daily HA  Pt. will have improved quality of sleep: with less pain;waking less times/night;in 12 weeks;Comment  Comment:: decreased episodes of sharp pain and decrease interruptions to 1-2x/night  Patient Turn Head: for conversation;for driving;for watching traffic;with less pain;with less difficulty;in 12 weeks  Patient will look up / down: for reading;with less pain;with less difficulty;in 12 weeks  Patient will decrease : NDI score;by _ points;for improved quality of function;for improved quality of life;in 6 weeks  by ___ points: 5    Patient's expectations/goals are fair to guarded due to chronicity of condition    Barriers to Learning or Achieving Goals:  Chronicity of the problem.  Co-morbidities or other medical factors.  No bend, twist/stretch arms overhead, cerv fusion x2 10-15 years ago, chronic pain syndrome, coronary by-pass 4-5 mos ago       Plan / Patient Instructions:        Plan of Care:   Authorization / Certification Start Date: 06/05/18  Authorization / Certification End Date: 08/29/18  Authorization / Certification Number of Visits: 12  Communication with: Referral Source  Patient Related Instruction: Nature of Condition;Treatment plan and rationale;Self Care instruction;Basis of treatment;Posture;Precautions;Next steps;Expected outcome  Times per Week: 1  Number of Weeks: 12  Number of Visits: 12  Discharge Planning: Independent HEP and self-management of symptoms    Plan of care and pathology of condition were reviewed with the pt.  Pt is in agreement with the POC.  A HEP was initiated today.  Pt was instructed in exercise with PT and a handout with detailed instructions was given.    The goals and plan of care were  established in collaboration with the patient.    Plan for next visit: patient will verify insurance coverage for PT then call to schedule 1x/week for 5 more visits then reassess progress and needs.  Assess response to gentle manual therapy, assess UE/ strength, begin instruction in c/scap ret, neck rotation, nodding, high cerv isometrics.     Subjective:         Social information:      Occupation:  Retired   Work Status:  Retired   Equipment Available: None    History of Present Illness:    Srikanth Graves is a 73 y.o. male who presents to therapy today with chief complaints of headaches since neck fusion 10-15 years ago and neck pain since fusion and more recently left neck pain s/p left neck pain.  He reports daily, constant HAs, originating suboccipitally with some frontal occurrence as HA intensity increases.  HA are annoying are made worse with abrupt, sharp neck pain with sudden and extreme movement in rotation and flex.  [place] [facility] so was further involved with pool therapy at [facility] for chronic pain.    Pt demo's signs and sx consistent with myofasical pain, poor posture and weakness.     Pain Rating Current:  5  Pain rating at best: 3  Pain rating at worst: 7-8  Pain description: sharp jolts with neck motions and HA's    Functional limitations are described as occurring with:   constant HA, Sleep interruptions, neck rotation/flex with ADL     Patient reports benefit from:  Tylenol         Objective:       Patient arrived 10 minutes late, then 20 minutes to complete intake paperwork.    Note: Items left blank indicates the item was not performed or not indicated at the time of the evaluation.    Patient Outcome Measures :    Neck Disability Score in %: 64.44   Scores range from 0-100%, where a score of 0% represents minimal pain and maximal function. The minmal clinically important difference is a score reduction of 10%.    Cervical Thoracic Examination  1.  Cervicalgia     2. Headache     3. Poor posture     4. Weakness of neck       Precautions/Restrictions: Bend/twist/stretch overhead per patient reports per MD, No bend, twist/stretch arms overhead, cerv fusion x2 10-15 years ago, chronic pain syndrome, coronary by-pass 4-5 mos ago  Involved side: bilat left>right    Posture Observation: Standing:  C-protraction, head tilted left, left shoulder/scap/iliac crest low, decreased L lordosis      Flexibility:    Palpation:  Mod,Bilat mid cervical/UT, right interscapular/scapular musculature, bilat mild SCM.  UE Screen: Not tested.    Passive Mobility-Joint Integrity: Not tested.     Cervical ROM:    Date: 6/5/2018     *Indicate scale AROM AROM AROM   Cervical Flexion Min loss, tight left neck     Cervical Extension NT      Right Left Right Left Right Left   Cervical Sidebending Mod loss, Pain Min-mod loss, pain       Cervical Rotation Min loss, pain/tight Mod loss, pain/tight       Cervical Protraction      Cervical Retraction      Thoracic Flexion      Thoracic Extension      Thoracic Sidebending         Thoracic Rotation           Strength     Date:      Cervical Myotomes/5 Right Left Right Left Right Left   Cervical Flexion (C1-2)         Cervical Sidebending (C3)         Shoulder Elevation (C4)         Shoulder Abduction (C5)         Elbow Flexion (C6)         Elbow Extension (C7)         Wrist Flexion (C7)         Wrist Extension (C6)         Thumb abduction (C8)         Finger Abduction (T1)           Sensation         Reflex Testing  Cervical Dermatomes Right Left UE Reflexes Right Left   Back of the Head (C2)   Biceps (C5-6)     Supraclavicular Fossa (C3)   Brachioradialis (C5-6)     AC Joint (C4)   Triceps (C7-8)     Lateral Biceps (C5)   Debbi s test     Palmar Thumb (C6)   LE Reflexes     Palmar 3rd Finger (C7)   Patellar (L3-4)     Palmar 5th Finger (C8)   Achilles (S1-2)     Ulnar Forearm (T1)   Babinski Response             Cervical Special Tests      Cervical Special Tests Right Left UE Nerve Mobility Right Left   Cervical compression   Median nerve     Cervical distraction   Ulnar nerve     Spurling s test   Radial nerve     Shoulder abduction sign   Thoracic outlet     Deep neck flexor endurance test   Cat     Upper cervical rotation   Adson s     Sharper-Wanda   Cervical rotation lateral flexion     Alar ligament test   Other:     Other:   Other:       Today's HEP: time constraint limited instruction.    Patient reported slight increased in frontal HA with evaluation but neck was less painful following STM.    Treatment Today    TREATMENT MINUTES COMMENTS   Evaluation 30 Cervical   Self-care/ Home management     Manual therapy 10 Gentle SO release, STM neck/UT   Neuromuscular Re-education     Therapeutic Activity     Therapeutic Exercises     Gait training     Modality__________________                Total 40    Blank areas are intentional and mean the treatment did not include these items.     PT Evaluation Code: (Please list factors)  Patient History/Comorbidities: see above  Examination: see above  Clinical Presentation: stable  Clinical Decision Making: low    Patient History/  Comorbidities Examination  (body structures and functions, activity limitations, and/or participation restrictions) Clinical Presentation Clinical Decision Making (Complexity)   No documented Comorbidities or personal factors 1-2 Elements Stable and/or uncomplicated Low   1-2 documented comorbidities or personal factor 3 Elements Evolving clinical presentation with changing characteristics Moderate   3-4 documented comorbidities or personal factors 4 or more Unstable and unpredictable High              Jennifer Miller  6/5/2018  1:31 PM

## 2021-06-19 NOTE — PROGRESS NOTES
[facility] Daily Progress     Patient Name: Srikanth Graves  PRECAUTIONS/RESTRICTIONS:  Bend/twist/stretch overhead per patient reports per MD, No bend, twist/stretch arms overhead, cerv fusion x2 10-15 years ago, chronic pain syndrome, coronary by-pass 4-5 mos ago  Date: 8/17/2018  Visit #: 3 (med cer/Ucaret:  6/5-8/29/18x12)  PTA visit #:  0  Referral Diagnosis: Neck Pain  Referring provider:  Rohit Pastor MD  Visit Diagnosis:     ICD-10-CM    1. Cervicalgia M54.2    2. Poor posture R29.3    3. Weakness of neck M53.82    4. Chronic intractable headache, unspecified headache type R51          Assessment:     Response to Intervention daily HA an improvement with initial visits, 2 months ago.  Patient is appropriate to continue with skilled physical therapy intervention, as indicated by initial plan of care.  Pain level used as clinical judgement outcome Measure is basically status quo/no improvement from initial pain rating of 5/10    Goal Status:  Ongoing  Pt. will demonstrate/verbalize independence in self-management of condition in : 12 weeks  Pt. will be independent with home exercise program in : 12 weeks  Pt. will report decreased intensity, frequency of : Headache;in 12 weeks;Comment  Comment:: decrease to Intermittent HA from constant daily HA  Pt. will have improved quality of sleep: with less pain;waking less times/night;in 12 weeks;Comment  Comment:: decreased episodes of sharp pain and decrease interruptions to 1-2x/night  Patient Turn Head: for conversation;for driving;for watching traffic;with less pain;with less difficulty;in 12 weeks  Patient will look up / down: for reading;with less pain;with less difficulty;in 12 weeks  Patient will decrease : NDI score;by _ points;for improved quality of function;for improved quality of life;in 6 weeks  by ___ points: 5    Plan / Patient Education:     Continue with initial plan of care.  Progress with home program as tolerated.  Consider a trial  of TENS next week.   Continue 1x/week for 3 more visits then reassess progress and needs.  Assess response to gentle manual therapy/STM, assess UE/ strength, trial TENS, begin instruction in c/scap ret, neck rotation, nodding, high cerv isometrics.    Spoke with [facility] and gave updated [facility] information.  Awaiting response regarding TENS coverage from his [employer] insurance.    Subjective:     Overall no changes.  Pain Ratin-5/10; worst pain 10/10. Pain is like a lightening shock on left UT into neck with cramping.  Twist/turn with increase pain. Right side is a burning pain. Pounding HA are constant and increase with neck pain in frontal head area.  Less pain on left.    Patient discouraged on difficulty obtaining a TENS unit for pain.    Would be interested in obtaining another TENS unit for pain management.  Last TENS use was 10-15 years ago.   States he has been working with  representative for 4 months to obtain a TENS unit and that NICOLETTE Mujica has been assisting him getting medical coverage from [facility] insurance company.  Response to PT/benefits:  No benefits noted but temporary relaxation with treatment.    Continued difficulties:  constant HA, Sleep interruptions, neck rotation/flex with ADL     Objective:     Patient had multiple complaints of poor attention to treating his pain and how medical professionals do not believe that he has real pain.  :  Before treatment:  No rhythm, disorganized, asymmetrical  After manual therapy:  Symmetrical, equal phases, 14 cycles/min    Palpation:  Left>right neck posterolateral paraspinals and left UT.    Cervical ROM:             Date: 2018       *Indicate scale AROM AROM AROM   Cervical Flexion Min loss, tight left neck       Cervical Extension NT         Right Left Right Left Right Left   Cervical Sidebending Mod loss, Pain Min-mod loss, pain           Cervical Rotation Min loss, pain/tight Mod loss, pain/tight           Cervical  Protraction             Tolerated treatment well with patient falling asleep during supine manual therapy and feeling more relaxation following.      Treatment Today    TREATMENT MINUTES COMMENTS   Evaluation     Self-care/ Home management  Discussed patients past treatment with TENS that was helpful and what the process will be to work towards obtaining a new unit.   Manual therapy 25 Supine:  Gentle SO release, gentle cervical MFR 30 sec hold, 10 sec restx5, gentle STM bilat neck into UT; Still point at LE's   Neuromuscular Re-education     Therapeutic Activity     Therapeutic Exercises     Gait training     Modality__________________                Total 25    Blank areas are intentional and mean the treatment did not include these items.       Jennifer Miller  8/17/2018

## 2021-06-19 NOTE — PROGRESS NOTES
[facility] Daily Progress     Patient Name: Srikanth Graves  PRECAUTIONS/RESTRICTIONS:  Bend/twist/stretch overhead per patient reports per MD, No bend, twist/stretch arms overhead, cerv fusion x2 10-15 years ago, chronic pain syndrome, coronary by-pass 4-5 mos ago  Date: 8/3/2018  Visit #: 2  PTA visit #:  0  Referral Diagnosis: Neck Pain  Referring provider:  Rohit Pastor MD  Visit Diagnosis:     ICD-10-CM    1. Cervicalgia M54.2    2. Poor posture R29.3    3. Weakness of neck M53.82    4. Chronic intractable headache, unspecified headache type R51          Assessment:     Response to Intervention daily HA an improvement with initial visits, 2 months ago.  Patient is appropriate to continue with skilled physical therapy intervention, as indicated by initial plan of care.  Pain level used as clinical judgement outcome Measure is basically status quo/no improvement from initial pain rating of 5/10    Goal Status:  Ongoing  Pt. will demonstrate/verbalize independence in self-management of condition in : 12 weeks  Pt. will be independent with home exercise program in : 12 weeks  Pt. will report decreased intensity, frequency of : Headache;in 12 weeks;Comment  Comment:: decrease to Intermittent HA from constant daily HA  Pt. will have improved quality of sleep: with less pain;waking less times/night;in 12 weeks;Comment  Comment:: decreased episodes of sharp pain and decrease interruptions to 1-2x/night  Patient Turn Head: for conversation;for driving;for watching traffic;with less pain;with less difficulty;in 12 weeks  Patient will look up / down: for reading;with less pain;with less difficulty;in 12 weeks  Patient will decrease : NDI score;by _ points;for improved quality of function;for improved quality of life;in 6 weeks  by ___ points: 5    Plan / Patient Education:     Continue with initial plan of care.  Progress with home program as tolerated.  Consider a trial of TENS next week.   Continue  1x/week for 4 more visits then reassess progress and needs.  Assess response to gentle manual therapy/STM, assess UE/ strength, trial TENS, contact  representative if information is provided, begin instruction in c/scap ret, neck rotation, nodding, high cerv isometrics.    Subjective:     Pain Ratin-5/10; worst pain 10/10. Pain is like a lightening shock on left UT into neck with cramping.  Twist/turn with increase pain. Right side is a burning pain. Pounding HA are constant and increase with neck pain in frontal head area.  Less pain on left.  No changes with treatment and reports having been slightly worse after initial treatment.  Patient discouraged on difficulty obtaining a TENS unit for pain.    Would be interested in obtaining another TENS unit for pain management.  Last TENS use was 10-15 years ago.   States he has been working with [employer] representative for 4 months to obtain a TENS unit and that NICOLETTE Mujica has been assisting him getting medical coverage from [employer] insurance company.  Response to PT/benefits:  No benefits noted.    Continued difficulties:  constant HA, Sleep interruptions, neck rotation/flex with ADL     Objective:     Patient had multiple complaints of poor attention to treating his pain and how medical professionals do not believe that he has real pain.    Palpation:  Left>right neck posterolateral paraspinals and left UT.  Cervical ROM:             Date: 2018       *Indicate scale AROM AROM AROM   Cervical Flexion Min loss, tight left neck       Cervical Extension NT         Right Left Right Left Right Left   Cervical Sidebending Mod loss, Pain Min-mod loss, pain           Cervical Rotation Min loss, pain/tight Mod loss, pain/tight           Cervical Protraction             Today's Exercises:  None due to time constraints.      Treatment Today    TREATMENT MINUTES COMMENTS   Evaluation     Self-care/ Home management 20 Discussed patients past treatment with TENS that was  helpful and what the process will be to work towards obtaining a new unit.   Manual therapy 10 +2 set up Seated:  Mod STM to bilat neck and UT   Neuromuscular Re-education     Therapeutic Activity     Therapeutic Exercises     Gait training     Modality__________________                Total 32    Blank areas are intentional and mean the treatment did not include these items.       Jennifer Miller  8/3/2018
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
## 2021-06-20 NOTE — PROGRESS NOTES
"[facility] Discharge Summary  Patient Name: Srikanth Graves  Date: 11/2/2018  Referral Diagnosis: Neck Pain/Chest Wall Pain/Costochondritis  Referring provider: Rohit Pastor MD  Visit Diagnosis:   1. Chest wall pain, chronic     2. Decreased ROM of thoracic spine     3. Poor posture     4. Muscle weakness (generalized)     5. Costochondritis     6. Cervicalgia     7. Weakness of neck     8. Chronic intractable headache, unspecified headache type         Goals:  Pt. will demonstrate/verbalize independence in self-management of condition in : 12 weeks-progress toward goal  Pt. will be independent with home exercise program in : 12 weeks-progress toward goal  Pt. will report decreased intensity, frequency of : Not Met  Pt. will have improved quality of sleep: with less pain;in 12 weeks;Comment-progress toward goal  Comment:: sleep with less pain in right chest area with bed mobility  Patient Turn Head: Not Met  Patient will look up / down: Not Met  Pt will: have less pain in right chest area when \"fooling around with his girlfriend\" in 12 weeks-status unknown    Patient was seen for 8 visits from 6/5/2018 to 9/21/2018 with 1 missed appointment.  The patient made progress toward goals in regards to chest wall pain but no improvement or progress toward meeting cervical goals and has demonstrated understanding of and independence in the home program for self-care, and progression to next steps.  He will initiate contact if questions or concerns arise.  The patient was instructed to follow up with physician's clinic.  Patient received a home program cervical ROM, stretch/strengthening, shoulder ROM, pectoral stretches and breathing techniques.  The patient discontinued therapy, did not return.    Therapy will be discontinued at this time.  The patient will need a new referral to resume.    Thank you for your referral.  Jennifer Miller  11/2/2018  7:05 AM      [facility] Daily Progress Neck " and Chest Wall/Costochonritis Pain    Patient Name: Srikanth Graves  PRECAUTIONS/RESTRICTIONS:  Bend/twist/stretch overhead per patient reports per MD, No bend, twist/stretch arms overhead, cerv fusion x2 10-15 years ago, chronic pain syndrome, coronary by-pass 4-5 mos ago  Date: 9/21/2018  Visit #: 7/neck (med cer/[facility]: 8/30/2018-10/31/2018 x7); chest wall 2/8  PTA visit #:  0  Referral Diagnosis: Neck Pain/Chest Wall Pain/Costochondritis  Referring provider:  Rohit Pastor MD  Visit Diagnosis:     ICD-10-CM    1. Chest wall pain, chronic R07.89     G89.29    2. Decreased ROM of thoracic spine M25.60    3. Poor posture R29.3    4. Muscle weakness (generalized) M62.81    5. Costochondritis M94.0    6. Cervicalgia M54.2    7. Weakness of neck M53.82    8. Chronic intractable headache, unspecified headache type R51          Assessment:     HEP/POC compliance is  good with arm exercises for costochondritis..  Patient demonstrates understanding/independence with home program.  Response to Intervention good for costochondritis as he hasn't had any sleep interruptions due to pain in the past week.' limited for neck pain as he continues to have sharp stabbing pain with neck motions, especially left rotation  Patient is benefitting from skilled physical therapy and is making steady progress toward functional goals.  Patient is appropriate to continue with skilled physical therapy intervention, as indicated by initial plan of care.  Pain level used as clinical judgement outcome Measure for neck is basically status quo/no improvement from initial pain rating of 5/10    Goal Status:  Ongoing  Pt. will demonstrate/verbalize independence in self-management of condition in : 12 weeks  Pt. will be independent with home exercise program in : 12 weeks  Pt. will report decreased intensity, frequency of : Not Met  Pt. will have improved quality of sleep: with less pain;in 12 weeks;Comment  Comment:: sleep with less pain in right  "chest area with bed mobility  Patient Turn Head: Not Met  Patient will look up / down: Not Met  Pt will: have less pain in right chest area when \"fooling around with his girlfriend\" in 12 weeks.    Plan / Patient Education:     Continue with initial plan of care.  Progress with home program as tolerated.   Continue instruction in c/scap ret, neck rotation,, high cerv isometrics for flex/ext.  Continue MFR to SO region, gentle distraction for neck, and STM.  If no further complaints/sleep interruptions in regards to chest wall pain, will hold further treatment to area.    Awaiting order for TENS electrodes.        Subjective:     Has a bladder infection.  Overall no prolonged changes in neck pain but no sleep interruptions due to chest wall pain  since adding shoulder/arm exercises but will still \"let me know it is there\" with certain quick movements.  Doing some arm movements while in the tub as well as gentle neck motions.    Pain Rating:Neck  2-6/10 all the time noted when \"nodding off\" when sitting due to strained neck position provoke frontal HA; worst pain 10/10 upon waking up and initial weightbearing in the morning. Pain is like a lightening shock on left UT into neck as well as on right.   Also reporting that head is sore especially on left to jaw and when opening mouth wide.    Frontal head HA noted with pressure to SO region.     Using TENS up to 60 minutes every 5-6 hours and reports relief only when unit is on.  Does feel it offers some pain relief.    Response to PT/benefits: No sleep interruptions due to chest wall pain.  Rating less neck pain    Continued difficulties:  constant HA, , neck rotation/flex with ADL     Objective:     Restrictive neck ROM left>right with soft cerv paraspinal tightness/tenderness with trigger point tenderness SO  Region to mastoid process causing frontal HA.    Tolerated treatment reporting manual therapy offered good relaxation. Tolerated assisted neck rotation bilat but  " "frontal HA with SO stretch with cerv ret which ceased when exercise was stopped..  OPT EXERCISES 9/21/2018   Comment #3 Nodding with towel support, 15x-H   Exercise #4 High cerv isometrics for rotation, 5x5\" each; trialed neck flex/ext isometric with poor performance.     Treatment Today    TREATMENT MINUTES COMMENTS   Evaluation     Self-care/ Home management 15 Patient further discussed need to just talk about this \"situation \" that causes distress.   Manual therapy 20 Supine:  Supine:  gentle SO release/pressure, STM neck, gentle cerv distraction to obtain release, assisted neck rotation   Neuromuscular Re-education     Therapeutic Activity     Therapeutic Exercises 10 See flow sheet   Gait training     Modality________TENS bilat , crossed electrodes and GB 20 and QQ77__________  Mode SWP, int 11.              Total 45    Blank areas are intentional and mean the treatment did not include these items.       Jennifer Miller  9/21/2018    "
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
## 2021-06-21 NOTE — LETTER
Letter by Tasha Patel AuD at      Author: Tasha Patel AuD Service: -- Author Type: --    Filed:  Encounter Date: 11/20/2020 Status: (Other)       [facility]- Audiology Benefit Letter    MARCO A ALCARAZ  1945  [address]  [place] [address]    Insurance Company: Payor: MEDICARE / Plan: MEDICARE A AND B / Product Type: Medicare /  UCARE MSC+    [place] Product: Yes    ID # :  [id number]/ [id number]    Group#:  N/A/ [id number]    Estimate of Benefits  Consult Visit Benefits:  MEDICARE & UCARE 11/2020 GISELLE     HAE, HAF, HAC Benefits:   COVERED SERVICE PATIENT HAS NOT RCVD ANY HEARING AID BENEFITS WITHIN THE LAST 5 YEARS PER MNITS.    Batteries/Accessories Coverage:  COVERED/ COVERED    Representative name: RTE & MNITS  Call reference:   Date of contact: 11/20/2020    Insurance verified today by SHEILA CHRISTOPHER    Additional Information:      The information provided is an estimate of benefits. This does not guarantee coverage; the insurance company will make the final determination of coverage to include patient responsibility of payment by the patient.   [facility] is not responsible for the decisions made by the insurance company regarding coverage.  Any changes to coverage (new plan or new policy) or procedures may void the contents provided in this letter.     Term Definitions  Patient responsibility: Can include but not limited to: co-pays, co-insurance, deductibles, out-of-pocket and non-covered and/or policy exclusions.

## 2021-06-21 NOTE — LETTER
Letter by Una Connolly AuD at      Author: Una Connolly AuD Service: -- Author Type: --    Filed:  Encounter Date: 1/8/2021 Status: (Other)       [facility]- Audiology Benefit Letter    MARCO A ALCARAZ  1945  [address]  [place] [address]    Insurance Company: Payor: MEDICARE / Plan: MEDICARE A AND B / Product Type: Medicare /  UCARE MSC+    [place] Product: Yes    ID # :  [id number]/ [id number]    Group#:  N/A/ MEMEMP    Estimate of Benefits  Consult Visit Benefits:  MEDICARE & UCARE 1/2021 GISELLE     HAE, HAF, HAC Benefits:   COVERED SERVICE PATIENT HAS NOT RCVD ANY HEARING AID BENEFITS WITHIN THE LAST 5 YEARS PER MNITS.    Batteries/Accessories Coverage:  COVERED/ COVERED    Representative name: RTE & MNITS  Call reference:   Date of contact: 1/8/2021    Insurance verified today by SHEILA CHRISTOPHER    Additional Information:      The information provided is an estimate of benefits. This does not guarantee coverage; the insurance company will make the final determination of coverage to include patient responsibility of payment by the patient.   [facility] is not responsible for the decisions made by the insurance company regarding coverage.  Any changes to coverage (new plan or new policy) or procedures may void the contents provided in this letter.     Term Definitions  Patient responsibility: Can include but not limited to: co-pays, co-insurance, deductibles, out-of-pocket and non-covered and/or policy exclusions.

## 2021-06-26 NOTE — PROGRESS NOTES
AUDIOLOGY REPORT     SUBJECTIVE: Srikanth Graves, 76 y.o. male, was seen on 06/14/21 for a hearing aid fitting. He was accompanied by a personal care assistant today. His hearing was last assessed at our clinic on 11/20/20 and results revealed normal to severe sensorineural hearing loss in the right ear and normal to moderately-severe sensorineural hearing loss in the left ear. Medical clearance for hearing aids has been provided by Dr. Celeste.      OBJECTIVE: Otoscopy reveals clear canals in both ears.      Hearing Aids   : Proxima Cancion  Model:  Howard 2400      Style:  Half shell in-the-ear  SN:  [id number] (R), [id number] (L)  Warranty: 05/14/2024  [place] Model: 22624-CME  Battery: Rechargeable    Real ear measures were performed using the Audioscan Verifit probe-tube microphone system and the NAL-NL2 prescriptive targets. Gain was adjusted to obtain a closer match to prescriptive targets for soft, average, and loud inputs. Maximum output was measured and was within acceptable limits and patient tolerance.      Speech Intelligibility Index (SII)  The speech intelligibility index (SII) estimates the amount of audible speech at different presentation levels through the hearing aids. The following SII values represent audibility for average inputs:  Unaided SII: 17 (R); 20 (L)  Aided SII: 46 (R); 51 (L)     The push button is enabled to increase and decrease volume with a short press and the power the aids on and off with a long press. Frequency lowering was disabled. Gain was set to experience level 2. Low frequency gain was decreased by 3 dB to help Carlos adjust to the sound of his voice. High frequencies were decreased by 3 dB on the left hearing aid to reduce a slight feedback.      Carlos reports satisfaction with the fit and sound quality of the instruments.  Use, care, trial period and realistic expectations were reviewed in detail. Carlos is able to demonstrate independent manipulation of the instruments with   use and insertion/removal.      ASSESSMENT: Hearing aids fit. Purchase agreement signed and patient is provided a copy along with the [place] [employer] brochure.     PLAN: Srikanth Graves will return in 4 weeks for a hearing aid follow up. Please contact our clinic at [phone] with questions or concerns.    Wallace Baez, [facility]-A  [place] Licensed Audiologist #[id number]

## 2021-06-30 NOTE — PROGRESS NOTES
"Progress Notes by Tasha Patel AuD at 11/20/2020 10:00 AM     Author: Tasha Patel AuD Service: -- Author Type: Audiologist    Filed: 11/20/2020 10:29 AM Encounter Date: 11/20/2020 Status: Signed    : Tasha Patel AuD (Audiologist)       Audiology Report    Summary: Audiology visit completed. Please see audiogram below or in \"media\" tab for case history and results.     Plan:  The patient is returned to ENT for follow up. Return for retesting with ENT recommendation. Audiogram provided to patient. Scheduled for hearing aid evaluation. Insurance benefits will be checked; if he does not have coverage, the letter will be sent to his home.    Wallace Olson, [facility]-A  Clinical Audiologist  [place] #[id number]           "

## 2021-06-30 NOTE — PROGRESS NOTES
Progress Notes by Una Connolly AuD at 4/12/2021  9:00 AM     Author: Una Connolly AuD Service: -- Author Type: Audiologist    Filed: 4/12/2021  5:06 PM Encounter Date: 4/12/2021 Status: Signed    : Una Connolly AuD (Audiologist)       AUDIOLOGY REPORT     SUBJECTIVE: Srikanth Graves, 75 y.o. male, was seen on 04/12/21 for a hearing aid evaluation. He was accompanied by a personal care assistant today. His hearing was last assessed at our clinic on 11/20/20 and results revealed normal to severe sensorineural hearing loss in the right ear and normal to moderately-severe sensorineural hearing loss in the left ear. Medical clearance for hearing aids has been provided by Dr. Celeste.     Carlos has a fairly difficult time hearing conversation at normal volumes during the visit today.     OBJECTIVE: Manufacturers, styles, monaural vs binaural fittings, trial period, technology levels, and realistic expectations were discussed. Srikanth Graves would like bilateral hearing aids. He is interested in rechargeable in-the-ear hearing aids     Hearing Aids   : Hi-G-Tek  Model:  Howard 2400 HS             Style:  Half shell in-the-ear  Color:  [place] faceplate  Battery: rechargeable  [place] Model: 34322-WIU     Bilateral ear impressions taken without incident.     ASSESSMENT: Reviewed purchase information and warranty information with patient. The 45 day trial period was explained to patient. The patient was given a copy of the [place] [employer] consumer brochure on purchasing hearing instruments. Patient risk factors have been provided to the patient in writing prior to the sale of the hearing aid per FDA regulation. The risk factors are also available in the User Instructional Booklet to be presented on the day of the hearing aid fitting. Hearing aid(s) ordered. Hearing aid evaluation completed.     PLAN: Srikanth Graves will return in 4 weeks for a hearing aid fitting with Jackie Rojas  Fiona. Please contact our clinic at [phone] with questions or concerns.    Wallace Baez, [facility]-A  [place] Licensed Audiologist #[id number]

## 2021-07-03 DIAGNOSIS — I25.118 CORONARY ARTERY DISEASE OF NATIVE ARTERY OF NATIVE HEART WITH STABLE ANGINA PECTORIS (H): Primary | ICD-10-CM

## 2021-07-11 DIAGNOSIS — I10 ESSENTIAL HYPERTENSION: ICD-10-CM

## 2021-07-13 RX ORDER — AMLODIPINE BESYLATE 10 MG/1
TABLET ORAL
Qty: 90 TABLET | Refills: 0 | Status: SHIPPED | OUTPATIENT
Start: 2021-07-13

## 2021-07-16 DIAGNOSIS — F51.01 PRIMARY INSOMNIA: ICD-10-CM

## 2021-07-19 RX ORDER — OMEPRAZOLE MAGNESIUM 20 MG
CAPSULE,DELAYED RELEASE (ENTERIC COATED) ORAL
Qty: 30 TABLET | Refills: 0 | Status: SHIPPED | OUTPATIENT
Start: 2021-07-19 | End: 2021-08-10

## 2021-07-29 ENCOUNTER — TELEPHONE (OUTPATIENT)
Dept: FAMILY MEDICINE | Facility: CLINIC | Age: 76
End: 2021-07-29

## 2021-07-29 NOTE — TELEPHONE ENCOUNTER
Ellipta is not covered, insurance would like to use Spiriva.  Please advise and if warranted, send a new Rx.  Chika Aguilar, CMA

## 2021-08-10 DIAGNOSIS — F51.01 PRIMARY INSOMNIA: ICD-10-CM

## 2021-08-10 RX ORDER — OMEPRAZOLE MAGNESIUM 20 MG
CAPSULE,DELAYED RELEASE (ENTERIC COATED) ORAL
Qty: 30 TABLET | Refills: 0 | Status: SHIPPED | OUTPATIENT
Start: 2021-08-10 | End: 2021-09-02

## 2021-08-15 DIAGNOSIS — Z00.00 ROUTINE ADULT HEALTH MAINTENANCE: ICD-10-CM

## 2021-08-16 RX ORDER — MULTIVITAMIN WITH FOLIC ACID 400 MCG
TABLET ORAL
Qty: 90 TABLET | Refills: 2 | Status: SHIPPED | OUTPATIENT
Start: 2021-08-16

## 2021-09-02 DIAGNOSIS — F51.01 PRIMARY INSOMNIA: ICD-10-CM

## 2021-09-02 RX ORDER — OMEPRAZOLE MAGNESIUM 20 MG
CAPSULE,DELAYED RELEASE (ENTERIC COATED) ORAL
Qty: 30 TABLET | Refills: 0 | Status: SHIPPED | OUTPATIENT
Start: 2021-09-02 | End: 2021-10-04

## 2021-10-02 DIAGNOSIS — F51.01 PRIMARY INSOMNIA: ICD-10-CM

## 2021-10-04 RX ORDER — OMEPRAZOLE MAGNESIUM 20 MG
CAPSULE,DELAYED RELEASE (ENTERIC COATED) ORAL
Qty: 30 TABLET | Refills: 0 | Status: SHIPPED | OUTPATIENT
Start: 2021-10-04 | End: 2021-10-27

## 2021-10-26 DIAGNOSIS — F51.01 PRIMARY INSOMNIA: ICD-10-CM

## 2021-10-27 RX ORDER — OMEPRAZOLE MAGNESIUM 20 MG
CAPSULE,DELAYED RELEASE (ENTERIC COATED) ORAL
Qty: 30 TABLET | Refills: 0 | Status: SHIPPED | OUTPATIENT
Start: 2021-10-27 | End: 2021-11-18

## 2021-11-18 DIAGNOSIS — F51.01 PRIMARY INSOMNIA: ICD-10-CM

## 2021-11-18 RX ORDER — OMEPRAZOLE MAGNESIUM 20 MG
CAPSULE,DELAYED RELEASE (ENTERIC COATED) ORAL
Qty: 30 TABLET | Refills: 0 | Status: SHIPPED | OUTPATIENT
Start: 2021-11-18 | End: 2021-12-21

## 2021-12-18 DIAGNOSIS — F51.01 PRIMARY INSOMNIA: ICD-10-CM

## 2021-12-21 RX ORDER — OMEPRAZOLE MAGNESIUM 20 MG
CAPSULE,DELAYED RELEASE (ENTERIC COATED) ORAL
Qty: 30 TABLET | Refills: 0 | Status: SHIPPED | OUTPATIENT
Start: 2021-12-21 | End: 2022-01-17

## 2022-01-14 ENCOUNTER — TELEPHONE (OUTPATIENT)
Dept: FAMILY MEDICINE | Facility: CLINIC | Age: 77
End: 2022-01-14
Payer: MEDICARE

## 2022-01-15 DIAGNOSIS — F51.01 PRIMARY INSOMNIA: ICD-10-CM

## 2022-01-17 RX ORDER — OMEPRAZOLE MAGNESIUM 20 MG
CAPSULE,DELAYED RELEASE (ENTERIC COATED) ORAL
Qty: 30 TABLET | Refills: 0 | Status: SHIPPED | OUTPATIENT
Start: 2022-01-17 | End: 2022-02-09

## 2022-01-19 DIAGNOSIS — M79.606 PAIN OF LOWER EXTREMITY, UNSPECIFIED LATERALITY: Primary | ICD-10-CM

## 2022-01-21 NOTE — TELEPHONE ENCOUNTER
Doctor sent alternative medication tiZANidine to pharmacy. Patient was notified. Thanks. JAY JAY Martin

## 2022-02-09 DIAGNOSIS — F51.01 PRIMARY INSOMNIA: ICD-10-CM

## 2022-02-09 RX ORDER — OMEPRAZOLE MAGNESIUM 20 MG
CAPSULE,DELAYED RELEASE (ENTERIC COATED) ORAL
Qty: 30 TABLET | Refills: 0 | Status: SHIPPED | OUTPATIENT
Start: 2022-02-09 | End: 2022-03-09

## 2022-02-10 DIAGNOSIS — M79.606 PAIN OF LOWER EXTREMITY, UNSPECIFIED LATERALITY: ICD-10-CM

## 2022-03-09 DIAGNOSIS — F51.01 PRIMARY INSOMNIA: ICD-10-CM

## 2022-03-09 RX ORDER — OMEPRAZOLE MAGNESIUM 20 MG
CAPSULE,DELAYED RELEASE (ENTERIC COATED) ORAL
Qty: 30 TABLET | Refills: 0 | Status: SHIPPED | OUTPATIENT
Start: 2022-03-09

## 2022-04-06 ENCOUNTER — TELEPHONE (OUTPATIENT)
Dept: FAMILY MEDICINE | Facility: CLINIC | Age: 77
End: 2022-04-06
Payer: MEDICARE

## 2022-04-06 DIAGNOSIS — J44.9 CHRONIC OBSTRUCTIVE PULMONARY DISEASE, UNSPECIFIED COPD TYPE (H): ICD-10-CM

## 2022-04-06 NOTE — TELEPHONE ENCOUNTER
REQUEST FOR A REFILL OR NEW PRESCRIPTION   Pharmacy comments: Please send new RX as old one .     Please advise.Thank you.   JAY JAY Vaughn

## 2022-04-14 DIAGNOSIS — J44.9 CHRONIC OBSTRUCTIVE PULMONARY DISEASE, UNSPECIFIED COPD TYPE (H): ICD-10-CM

## 2023-01-21 NOTE — PATIENT INSTRUCTIONS - HE
You have mild to moderate to severe hearing loss in both ear  Your word clarity is good at 84% in both ears  Use cream 2-3x weekly at bedtime for itching  Return annually for hearing yaquelin  Recommend hearing aid consultation.    [id number]